# Patient Record
Sex: FEMALE | Race: WHITE | Employment: OTHER | URBAN - METROPOLITAN AREA
[De-identification: names, ages, dates, MRNs, and addresses within clinical notes are randomized per-mention and may not be internally consistent; named-entity substitution may affect disease eponyms.]

---

## 2017-01-25 ENCOUNTER — TELEPHONE (OUTPATIENT)
Dept: FAMILY MEDICINE CLINIC | Age: 82
End: 2017-01-25

## 2017-01-25 NOTE — TELEPHONE ENCOUNTER
Pt called stating she is having \"urinary frequency\" and was requesting medication. She states it just started and does not want to come in. I informed the pt that Dr. Andre Sawyer would need to see her and possibly do a UA to make sure everything is okay. The pt finally agreed to come in tomorrow for a 30min visit at 10:30 but states she wants to know if she \"can just have medication and not see the doctor\" Please advise.

## 2017-01-26 ENCOUNTER — OFFICE VISIT (OUTPATIENT)
Dept: FAMILY MEDICINE CLINIC | Age: 82
End: 2017-01-26

## 2017-01-26 ENCOUNTER — HOSPITAL ENCOUNTER (OUTPATIENT)
Dept: LAB | Age: 82
Discharge: HOME | End: 2017-01-26
Payer: MEDICARE

## 2017-01-26 VITALS
TEMPERATURE: 97.8 F | SYSTOLIC BLOOD PRESSURE: 128 MMHG | OXYGEN SATURATION: 97 % | HEART RATE: 68 BPM | WEIGHT: 161 LBS | DIASTOLIC BLOOD PRESSURE: 72 MMHG | BODY MASS INDEX: 28.53 KG/M2 | HEIGHT: 63 IN | RESPIRATION RATE: 20 BRPM

## 2017-01-26 DIAGNOSIS — N30.01 ACUTE CYSTITIS WITH HEMATURIA: ICD-10-CM

## 2017-01-26 DIAGNOSIS — R35.0 URINARY FREQUENCY: ICD-10-CM

## 2017-01-26 DIAGNOSIS — N30.01 ACUTE CYSTITIS WITH HEMATURIA: Primary | ICD-10-CM

## 2017-01-26 LAB
BILIRUB UR QL STRIP: NEGATIVE
GLUCOSE UR-MCNC: NEGATIVE MG/DL
KETONES P FAST UR STRIP-MCNC: NEGATIVE MG/DL
PH UR STRIP: 5.5 [PH] (ref 4.6–8)
PROT UR QL STRIP: NEGATIVE MG/DL
SP GR UR STRIP: 1.03 (ref 1–1.03)
UA UROBILINOGEN AMB POC: NORMAL (ref 0.2–1)
URINALYSIS CLARITY POC: CLEAR
URINALYSIS COLOR POC: YELLOW
URINE BLOOD POC: NORMAL
URINE LEUKOCYTES POC: NORMAL
URINE NITRITES POC: POSITIVE

## 2017-01-26 PROCEDURE — 87186 SC STD MICRODIL/AGAR DIL: CPT | Performed by: INTERNAL MEDICINE

## 2017-01-26 PROCEDURE — 87077 CULTURE AEROBIC IDENTIFY: CPT | Performed by: INTERNAL MEDICINE

## 2017-01-26 PROCEDURE — 87086 URINE CULTURE/COLONY COUNT: CPT | Performed by: INTERNAL MEDICINE

## 2017-01-26 RX ORDER — AMOXICILLIN AND CLAVULANATE POTASSIUM 500; 125 MG/1; MG/1
1 TABLET, FILM COATED ORAL 2 TIMES DAILY
Qty: 14 TAB | Refills: 0 | Status: SHIPPED | OUTPATIENT
Start: 2017-01-26 | End: 2017-02-02

## 2017-01-26 NOTE — MR AVS SNAPSHOT
Visit Information Date & Time Provider Department Dept. Phone Encounter #  
 1/26/2017 10:30 AM Dolores Recio  [address] [phone] [id number] Upcoming Health Maintenance Date Due Pneumococcal 65+ High/Highest Risk (2 of 2 - PPSV23) 1/1/2013 GLAUCOMA SCREENING Q2Y 9/18/2016 MEDICARE YEARLY EXAM 9/27/2017 DTaP/Tdap/Td series (2 - Td) 9/1/2026 Allergies as of 1/26/2017  Review Complete On: 1/26/2017 By: Dolores Recio MD  
  
 Severity Noted Reaction Type Reactions Other Medication High 07/21/2014    Anaphylaxis MRI Dye Epinephrine  11/19/2010    Palpitations Ivp [Fd And C Blue No.1]  11/19/2010    Swelling Facial swelling many years ago, now only has oral contrast  
 Macrobid [Nitrofurantoin Monohyd/m-cryst]  09/01/2016    Other (comments) Whole body starts shaking Other Medication  11/19/2010    Palpitations Prednisone  01/28/2011    Nausea and Vomiting Current Immunizations  Reviewed on 6/26/2014 Name Date Influenza High Dose Vaccine PF 9/1/2016 12:05 PM, 9/15/2015 Influenza Vaccine 9/20/2014 Influenza Vaccine Whole 9/1/2010 Pneumococcal Vaccine (Unspecified Type) 1/1/2008 Not reviewed this visit You Were Diagnosed With   
  
 Codes Comments Acute cystitis with hematuria    -  Primary ICD-10-CM: N30.01 
ICD-9-CM: 595.0 Urinary frequency     ICD-10-CM: R35.0 ICD-9-CM: 788.41 Vitals BP Pulse Temp Resp Height(growth percentile) Weight(growth percentile) 128/72 (BP 1 Location: Left arm, BP Patient Position: Sitting) 68 97.8 °F (36.6 °C) 20 5' 3\" (1.6 m) 161 lb (73 kg) SpO2 BMI OB Status Smoking Status 97% 28.52 kg/m2 Postmenopausal Never Smoker BMI and BSA Data Body Mass Index Body Surface Area 28.52 kg/m 2 1.8 m 2 Preferred Pharmacy Pharmacy Name Phone [address], [place] [address] [phone] Your Updated Medication List  
  
   
This list is accurate as of: 1/26/17 11:06 AM.  Always use your most recent med list.  
  
  
  
  
 ALPRAZolam 0.5 mg tablet Commonly known as:  Bridgette Oscar Take 1/2 -1 tab daily as needed for anxiety  
  
 amoxicillin-clavulanate 500-125 mg per tablet Commonly known as:  AUGMENTIN Take 1 Tab by mouth two (2) times a day for 7 days. aspirin 325 mg tablet Commonly known as:  ASPIRIN Take 1 Tab by mouth daily. atorvastatin 80 mg tablet Commonly known as:  LIPITOR Take 1 Tab by mouth nightly. citalopram 40 mg tablet Commonly known as:  CELEXA  
TAKE ONE TABLET BY MOUTH EVERY DAY  
  
 FLONASE 50 mcg/actuation nasal spray Generic drug:  fluticasone 2 Sprays by Both Nostrils route daily. traMADol 50 mg tablet Commonly known as:  ULTRAM  
Take 1 Tab by mouth daily. Max Daily Amount: 50 mg.  
  
 TYLENOL EXTRA STRENGTH 500 mg tablet Generic drug:  acetaminophen Take  by mouth every six (6) hours as needed for Pain. VITAMIN D3 1,000 unit Cap Generic drug:  cholecalciferol Take  by mouth. Prescriptions Sent to Pharmacy Refills  
 amoxicillin-clavulanate (AUGMENTIN) 500-125 mg per tablet 0 Sig: Take 1 Tab by mouth two (2) times a day for 7 days. Class: Normal  
 Pharmacy: [facility] at [address] [place] [address] #: [phone] Route: Oral  
  
We Performed the Following AMB POC URINALYSIS DIP STICK AUTO W/O MICRO [22796 CPT(R)] To-Do List   
 01/26/2017 Microbiology:  CULTURE, URINE Patient Instructions Urinary Tract Infection in Women: Care Instructions Your Care Instructions A urinary tract infection, or UTI, is a general term for an infection anywhere between the kidneys and the urethra (where urine comes out). Most UTIs are bladder infections. They often cause pain or burning when you urinate. UTIs are caused by bacteria and can be cured with antibiotics. Be sure to complete your treatment so that the infection goes away. Follow-up care is a key part of your treatment and safety. Be sure to make and go to all appointments, and call your doctor if you are having problems. It's also a good idea to know your test results and keep a list of the medicines you take. How can you care for yourself at home? · Take your antibiotics as directed. Do not stop taking them just because you feel better. You need to take the full course of antibiotics. · Drink extra water and other fluids for the next day or two. This may help wash out the bacteria that are causing the infection. (If you have kidney, heart, or liver disease and have to limit fluids, talk with your doctor before you increase your fluid intake.) · Avoid drinks that are carbonated or have caffeine. They can irritate the bladder. · Urinate often. Try to empty your bladder each time. · To relieve pain, take a hot bath or lay a heating pad set on low over your lower belly or genital area. Never go to sleep with a heating pad in place. To prevent UTIs · Drink plenty of water each day. This helps you urinate often, which clears bacteria from your system. (If you have kidney, heart, or liver disease and have to limit fluids, talk with your doctor before you increase your fluid intake.) · Consider adding cranberry juice to your diet. · Urinate when you need to. · Urinate right after you have sex. · Change sanitary pads often. · Avoid douches, bubble baths, feminine hygiene sprays, and other feminine hygiene products that have deodorants. · After going to the bathroom, wipe from front to back. When should you call for help? Call your doctor now or seek immediate medical care if: · Symptoms such as fever, chills, nausea, or vomiting get worse or appear for the first time. · You have new pain in your back just below your rib cage.  This is called flank pain. · There is new blood or pus in your urine. · You have any problems with your antibiotic medicine. Watch closely for changes in your health, and be sure to contact your doctor if: 
· You are not getting better after taking an antibiotic for 2 days. · Your symptoms go away but then come back. Where can you learn more? Go to http://ward-vin.info/. Enter O580 in the search box to learn more about \"Urinary Tract Infection in Women: Care Instructions. \" Current as of: August 12, 2016 Content Version: 11.1 © 5770-3788 [employer]. Care instructions adapted under license by [employer] (which disclaims liability or warranty for this information). If you have questions about a medical condition or this instruction, always ask your healthcare professional. [address] any warranty or liability for your use of this information. Introducing [facility]! [facility] introduces [employer] patient portal. Now you can access parts of your medical record, email your doctor's office, and request medication refills online. 1. In your internet browser, go to [email or web address]. [email or web address] 2. Click on the First Time User? Click Here link in the Sign In box. You will see the New Member Sign Up page. 3. Enter your [employer] Access Code exactly as it appears below. You will not need to use this code after youve completed the sign-up process. If you do not sign up before the expiration date, you must request a new code. · [employer] Access Code: [email or web address] Expires: 4/26/2017 11:06 AM 
 
4. Enter the last four digits of your Social Security Number (xxxx) and Date of Birth (mm/dd/yyyy) as indicated and click Submit. You will be taken to the next sign-up page. 5. Create a [employer] ID. This will be your [employer] login ID and cannot be changed, so think of one that is secure and easy to remember. 6. Create a [employer] password. You can change your password at any time. 7. Enter your Password Reset Question and Answer. This can be used at a later time if you forget your password. 8. Enter your e-mail address. You will receive e-mail notification when new information is available in [address] 9. Click Sign Up. You can now view and download portions of your medical record. 10. Click the Download Summary menu link to download a portable copy of your medical information. If you have questions, please visit the Frequently Asked Questions section of the [employer] website. Remember, [employer] is NOT to be used for urgent needs. For medical emergencies, dial 911. Now available from your iPhone and Android! Please provide this summary of care documentation to your next provider. Your primary care clinician is listed as [address]. If you have any questions after today's visit, please call [phone].

## 2017-01-26 NOTE — PROGRESS NOTES
Chief Complaint   Patient presents with    Urinary Frequency       ASSESSMENT/PLAN  1. Acute cystitis with hematuria  - amoxicillin-clavulanate (AUGMENTIN) 500-125 mg per tablet; Take 1 Tab by mouth two (2) times a day for 7 days. Dispense: 14 Tab; Refill: 0  - CULTURE, URINE; Future    2. Urinary frequency  - AMB POC URINALYSIS DIP STICK AUTO W/O MICRO      Push fluids, may use Pyridium OTC prn. Call or return to clinic prn if these symptoms worsen or fail to improve as anticipated. The plan was discussed with the patient. The patient verbalized understanding and is in agreement with the plan. All medication potential side effects were discussed with the patient. SUBJECTIVE: Zahra Meek is a 80 y.o. female who complains of urinary frequency, urgency and dysuria x 14 days, without flank pain, fever, chills, or abnormal vaginal discharge or bleeding. Having some incidents of incontinence. OBJECTIVE:   Visit Vitals    /72 (BP 1 Location: Left arm, BP Patient Position: Sitting)    Pulse 68    Temp 97.8 °F (36.6 °C)    Resp 20    Ht 5' 3\" (1.6 m)    Wt 161 lb (73 kg)    SpO2 97%    BMI 28.52 kg/m2       Gen: Appears well, in no apparent distress. CV: RRR  Pulm: CTA bilaterally. No wheezes/rales/crackles. : No CVA tenderness or inguinal adenopathy noted. Urine dipstick shows positive for RBC's and positive for nitrates.            Sowmya Farley MD

## 2017-01-26 NOTE — PATIENT INSTRUCTIONS
Urinary Tract Infection in Women: Care Instructions  Your Care Instructions    A urinary tract infection, or UTI, is a general term for an infection anywhere between the kidneys and the urethra (where urine comes out). Most UTIs are bladder infections. They often cause pain or burning when you urinate. UTIs are caused by bacteria and can be cured with antibiotics. Be sure to complete your treatment so that the infection goes away. Follow-up care is a key part of your treatment and safety. Be sure to make and go to all appointments, and call your doctor if you are having problems. It's also a good idea to know your test results and keep a list of the medicines you take. How can you care for yourself at home? · Take your antibiotics as directed. Do not stop taking them just because you feel better. You need to take the full course of antibiotics. · Drink extra water and other fluids for the next day or two. This may help wash out the bacteria that are causing the infection. (If you have kidney, heart, or liver disease and have to limit fluids, talk with your doctor before you increase your fluid intake.)  · Avoid drinks that are carbonated or have caffeine. They can irritate the bladder. · Urinate often. Try to empty your bladder each time. · To relieve pain, take a hot bath or lay a heating pad set on low over your lower belly or genital area. Never go to sleep with a heating pad in place. To prevent UTIs  · Drink plenty of water each day. This helps you urinate often, which clears bacteria from your system. (If you have kidney, heart, or liver disease and have to limit fluids, talk with your doctor before you increase your fluid intake.)  · Consider adding cranberry juice to your diet. · Urinate when you need to. · Urinate right after you have sex. · Change sanitary pads often. · Avoid douches, bubble baths, feminine hygiene sprays, and other feminine hygiene products that have deodorants.   · After going to the bathroom, wipe from front to back. When should you call for help? Call your doctor now or seek immediate medical care if:  · Symptoms such as fever, chills, nausea, or vomiting get worse or appear for the first time. · You have new pain in your back just below your rib cage. This is called flank pain. · There is new blood or pus in your urine. · You have any problems with your antibiotic medicine. Watch closely for changes in your health, and be sure to contact your doctor if:  · You are not getting better after taking an antibiotic for 2 days. · Your symptoms go away but then come back. Where can you learn more? Go to http://ward-vin.info/. Enter S604 in the search box to learn more about \"Urinary Tract Infection in Women: Care Instructions. \"  Current as of: August 12, 2016  Content Version: 11.1  © 9739-9753 [employer]. Care instructions adapted under license by [employer] (which disclaims liability or warranty for this information). If you have questions about a medical condition or this instruction, always ask your healthcare professional. [address] any warranty or liability for your use of this information.

## 2017-01-26 NOTE — PROGRESS NOTES
Jennyfer Sharpe is a 80 y.o. female  Chief Complaint   Patient presents with    Urinary Frequency     1. Have you been to the ER, urgent care clinic since your last visit? Hospitalized since your last visit? No    2. Have you seen or consulted any other health care providers outside of the [address] [place] [address] since your last visit? Include any pap smears or colon screening.  No

## 2017-01-28 LAB
BACTERIA SPEC CULT: NORMAL
SERVICE CMNT-IMP: NORMAL

## 2017-02-16 ENCOUNTER — TELEPHONE (OUTPATIENT)
Dept: FAMILY MEDICINE CLINIC | Age: 82
End: 2017-02-16

## 2017-02-16 NOTE — TELEPHONE ENCOUNTER
Please advise, I do not believe patient is a canidate for a colo and pap she does not need for her age right?

## 2017-02-22 ENCOUNTER — OFFICE VISIT (OUTPATIENT)
Dept: FAMILY MEDICINE CLINIC | Age: 82
End: 2017-02-22

## 2017-02-22 ENCOUNTER — HOSPITAL ENCOUNTER (OUTPATIENT)
Dept: LAB | Age: 82
Discharge: HOME | End: 2017-02-22
Payer: MEDICARE

## 2017-02-22 VITALS
BODY MASS INDEX: 28.53 KG/M2 | DIASTOLIC BLOOD PRESSURE: 70 MMHG | HEIGHT: 63 IN | TEMPERATURE: 97.3 F | RESPIRATION RATE: 16 BRPM | OXYGEN SATURATION: 96 % | WEIGHT: 161 LBS | SYSTOLIC BLOOD PRESSURE: 112 MMHG | HEART RATE: 74 BPM

## 2017-02-22 DIAGNOSIS — N95.2 ATROPHIC VAGINITIS: ICD-10-CM

## 2017-02-22 DIAGNOSIS — R31.0 HEMATURIA, GROSS: Primary | ICD-10-CM

## 2017-02-22 DIAGNOSIS — R31.0 HEMATURIA, GROSS: ICD-10-CM

## 2017-02-22 LAB
BILIRUB UR QL STRIP: NORMAL
GLUCOSE UR-MCNC: NEGATIVE MG/DL
KETONES P FAST UR STRIP-MCNC: NEGATIVE MG/DL
PH UR STRIP: 5 [PH] (ref 4.6–8)
PROT UR QL STRIP: NORMAL MG/DL
SP GR UR STRIP: 1.03 (ref 1–1.03)
UA UROBILINOGEN AMB POC: NORMAL (ref 0.2–1)
URINALYSIS CLARITY POC: CLEAR
URINALYSIS COLOR POC: YELLOW
URINE BLOOD POC: NORMAL
URINE LEUKOCYTES POC: NEGATIVE
URINE NITRITES POC: NEGATIVE

## 2017-02-22 PROCEDURE — 87086 URINE CULTURE/COLONY COUNT: CPT | Performed by: FAMILY MEDICINE

## 2017-02-22 RX ORDER — DICYCLOMINE HYDROCHLORIDE 10 MG/1
10 CAPSULE ORAL
COMMUNITY
End: 2017-03-10 | Stop reason: SDUPTHER

## 2017-02-22 NOTE — PROGRESS NOTES
Cecilia Kelley is a 80 y.o. female  Here for blood in urine       1. Have you been to the ER, urgent care clinic or hospitalized since your last visit? NO.     2. Have you seen or consulted any other health care providers outside of the [address] [place] [address] since your last visit (Include any pap smears or colon screening)? NO      Do you have an Advanced Directive? NO    Would you like information on Advanced Directives?  NO

## 2017-02-22 NOTE — PATIENT INSTRUCTIONS
Estrogen vaginal cream, 0.5 g per applicator vaginally daily x 1 week, then 2-3 times each week  Follow up for new symptoms, worsening symptoms or failure to improve. Atrophic Vaginitis: Care Instructions  Your Care Instructions    Atrophic vaginitis is an irritation of the vagina. It's caused by thinning tissues and less moisture in the vaginal walls. It often happens during menopause when hormone levels change. Surgery to remove the ovaries also can cause it. Your doctor may do tests to rule out other causes. And you may get tests to measure your hormone levels. The problem is most often treated with the hormone estrogen. It comes in a cream, tablets, or a soft plastic ring that is placed in the vagina. Follow-up care is a key part of your treatment and safety. Be sure to make and go to all appointments, and call your doctor if you are having problems. It's also a good idea to know your test results and keep a list of the medicines you take. How can you care for yourself at home? · Use a water-based lubricant for your vagina if sex is dry or painful. Examples are Astroglide, Wet Lubricant Gel, and K-Y Jelly. · Talk with your doctor about using low-dose vaginal estrogen. It treats dryness and thinning tissue. · Do not douche. · Having sex improves blood flow to the vagina. This helps keep your tissue healthy. · Wipe from front to back after you use the toilet. This stops the spread of bacteria to your vagina. When should you call for help? Watch closely for changes in your health, and be sure to contact your doctor if:  · You have unexpected vaginal bleeding. · You do not get better as expected. Where can you learn more? Go to http://ward-vin.info/. Enter R330 in the search box to learn more about \"Atrophic Vaginitis: Care Instructions. \"  Current as of: February 25, 2016  Content Version: 11.1  © 2818-2588 [employer].  Care instructions adapted under license by [employer] (which disclaims liability or warranty for this information). If you have questions about a medical condition or this instruction, always ask your healthcare professional. [address] any warranty or liability for your use of this information.

## 2017-02-22 NOTE — PROGRESS NOTES
HISTORY OF PRESENT ILLNESS  Juan Davenport is a 80 y.o. female. Blood in Urine    The history is provided by the patient and medical records. This is a new problem. Episode onset: 3 days ago. Associated symptoms include hematuria. Pertinent negatives include no chills, no nausea, no vomiting, no frequency, no urgency, no flank pain and no abdominal pain. Patient Active Problem List   Diagnosis Code    Depression F32.9    HLD (hyperlipidemia) E78.5    Osteopenia M85.80    Diverticulosis K57.90    Allergic rhinitis J30.9    Hearing loss of both ears H91.93    Dysgeusia R43.2    Sleep apnea G47.30    OA (osteoarthritis) M19.90    PND (post-nasal drip) R09.82    Anxiety F41.9    Nephrolithiasis N20.0    IBS (irritable bowel syndrome) K58.9    High cholesterol E78.00    Cerebral microvascular disease I67.9    CKD (chronic kidney disease) stage 3, GFR 30-59 ml/min N18.3    DNR (do not resuscitate) Z73    ESTHER (obstructive sleep apnea) G47.33    Advance care planning Z71.89    Chronic right shoulder pain M25.511, G89.29    TIA (transient ischemic attack) G45.9    Transient cerebral ischemia G45.9       Current Outpatient Prescriptions:     dicyclomine (BENTYL) 10 mg capsule, Take 10 mg by mouth 4 times daily (before meals and nightly). , Disp: , Rfl:     aspirin (ASPIRIN) 325 mg tablet, Take 1 Tab by mouth daily. , Disp: 30 Tab, Rfl: 0    fluticasone (FLONASE) 50 mcg/actuation nasal spray, 2 Sprays by Both Nostrils route daily. , Disp: , Rfl:     citalopram (CELEXA) 40 mg tablet, TAKE ONE TABLET BY MOUTH EVERY DAY, Disp: 90 Tab, Rfl: 1    acetaminophen (TYLENOL EXTRA STRENGTH) 500 mg tablet, Take  by mouth every six (6) hours as needed for Pain., Disp: , Rfl:     ALPRAZolam (XANAX) 0.5 mg tablet, Take 1/2 -1 tab daily as needed for anxiety, Disp: 30 Tab, Rfl: 2    traMADol (ULTRAM) 50 mg tablet, Take 1 Tab by mouth daily.  Max Daily Amount: 50 mg., Disp: 30 Tab, Rfl: 0    Cholecalciferol, Vitamin D3, (VITAMIN D3) 1,000 unit cap, Take  by mouth., Disp: , Rfl:     Review of Systems   Constitutional: Negative for chills and fever. Respiratory: Negative for shortness of breath. Cardiovascular: Negative for chest pain and palpitations. Gastrointestinal: Negative for abdominal pain, nausea and vomiting. Genitourinary: Positive for hematuria. Negative for dysuria, flank pain, frequency and urgency. Vaginal tenderness, reports  Putting a small piece of tissue in the introitus and noted a little blood on it. Physical Exam   Constitutional: She is oriented to person, place, and time. She appears well-developed and well-nourished. HENT:   Head: Normocephalic. Eyes: Conjunctivae and EOM are normal.   Neck: Neck supple. Cardiovascular: Normal rate, regular rhythm and normal heart sounds. Pulmonary/Chest: Effort normal and breath sounds normal.   Abdominal: Soft. There is no tenderness. No flank tenderness   Genitourinary:   Genitourinary Comments: Tender at the base of the introitus, no erythema or wound noted   Musculoskeletal: She exhibits no edema. Neurological: She is alert and oriented to person, place, and time. Skin: Skin is warm and dry. Psychiatric: She has a normal mood and affect. Her behavior is normal.   Nursing note and vitals reviewed. Results for Maria Antonia Prasad (MRN [id number]) as of 2/22/2017 15:06   Ref.  Range 2/22/2017 15:01   Color (UA POC) Unknown Yellow   Clarity (UA POC) Unknown Clear   Specific gravity (UA POC) Latest Ref Range: 1.001 - 1.035  1.030   pH (UA POC) Latest Ref Range: 4.6 - 8.0  5.0   Protein (UA POC) Latest Ref Range: Negative mg/dL 2+   Glucose (UA POC) Latest Ref Range: Negative  Negative   Ketones (UA POC) Latest Ref Range: Negative  Negative   Blood (UA POC) Latest Ref Range: Negative  3+   Bilirubin (UA POC) Latest Ref Range: Negative  1+   Urobilinogen (UA POC) Latest Ref Range: 0.2 - 1  0.2 mg/dL   Nitrites (UA POC) Latest Ref Range: Negative  Negative   Leukocyte esterase (UA POC) Latest Ref Range: Negative  Negative     ASSESSMENT and PLAN    ICD-10-CM ICD-9-CM    1. Hematuria, gross R31.0 599.71 AMB POC URINALYSIS DIP STICK MANUAL W/O MICRO      CULTURE, URINE   Estrogen vaginal cream, 0.5 g per applicator vaginally daily x 1 week, then 2-3 times each week  Follow up for new symptoms, worsening symptoms or failure to improve.   Will advise patient of urine culture results

## 2017-02-22 NOTE — MR AVS SNAPSHOT
Visit Information Date & Time Provider Department Dept. Phone Encounter #  
 2/22/2017  3:00 PM Yvonne Goff MD [employer] [phone] [id number] Upcoming Health Maintenance Date Due Pneumococcal 65+ High/Highest Risk (2 of 2 - PPSV23) 1/1/2013 GLAUCOMA SCREENING Q2Y 9/18/2016 MEDICARE YEARLY EXAM 9/27/2017 DTaP/Tdap/Td series (2 - Td) 9/1/2026 Allergies as of 2/22/2017  Review Complete On: 2/22/2017 By: Yvonne Goff MD  
  
 Severity Noted Reaction Type Reactions Other Medication High 07/21/2014    Anaphylaxis MRI Dye Epinephrine  11/19/2010    Palpitations Ivp [Fd And C Blue No.1]  11/19/2010    Swelling Facial swelling many years ago, now only has oral contrast  
 Macrobid [Nitrofurantoin Monohyd/m-cryst]  09/01/2016    Other (comments) Whole body starts shaking Other Medication  11/19/2010    Palpitations Prednisone  01/28/2011    Nausea and Vomiting Current Immunizations  Reviewed on 6/26/2014 Name Date Influenza High Dose Vaccine PF 9/1/2016 12:05 PM, 9/15/2015 Influenza Vaccine 9/20/2014 Influenza Vaccine Whole 9/1/2010 Pneumococcal Vaccine (Unspecified Type) 1/1/2008 Not reviewed this visit You Were Diagnosed With   
  
 Codes Comments Hematuria, gross    -  Primary ICD-10-CM: R31.0 ICD-9-CM: 599.71 Atrophic vaginitis     ICD-10-CM: N95.2 ICD-9-CM: 627.3 Vitals BP  
  
  
  
  
  
 112/70 (BP 1 Location: Left arm, BP Patient Position: Sitting) Vitals History BMI and BSA Data Body Mass Index Body Surface Area 28.52 kg/m 2 1.8 m 2 Preferred Pharmacy Pharmacy Name Phone [phone] , [place] [address] [phone] Your Updated Medication List  
  
   
This list is accurate as of: 2/22/17  3:22 PM.  Always use your most recent med list.  
  
  
  
  
 ALPRAZolam 0.5 mg tablet Commonly known as:  [place] Fine Take 1/2 -1 tab daily as needed for anxiety  
  
 aspirin 325 mg tablet Commonly known as:  ASPIRIN Take 1 Tab by mouth daily. citalopram 40 mg tablet Commonly known as:  CELEXA  
TAKE ONE TABLET BY MOUTH EVERY DAY  
  
 conjugated estrogens 0.625 mg/gram vaginal cream  
Commonly known as:  PREMARIN Insert 0.5 g vaginally x 1 week, then 2-3 x weekly  
  
 dicyclomine 10 mg capsule Commonly known as:  BENTYL Take 10 mg by mouth 4 times daily (before meals and nightly). FLONASE 50 mcg/actuation nasal spray Generic drug:  fluticasone 2 Sprays by Both Nostrils route daily. traMADol 50 mg tablet Commonly known as:  ULTRAM  
Take 1 Tab by mouth daily. Max Daily Amount: 50 mg.  
  
 TYLENOL EXTRA STRENGTH 500 mg tablet Generic drug:  acetaminophen Take  by mouth every six (6) hours as needed for Pain. VITAMIN D3 1,000 unit Cap Generic drug:  cholecalciferol Take  by mouth. Prescriptions Sent to Pharmacy Refills  
 conjugated estrogens (PREMARIN) 0.625 mg/gram vaginal cream 0 Sig: Insert 0.5 g vaginally x 1 week, then 2-3 x weekly Class: Normal  
 Pharmacy: [address] [place] [address] at [address] [place] [address] Ph #: [phone] We Performed the Following AMB POC URINALYSIS DIP STICK MANUAL W/O MICRO [87897 CPT(R)] Patient Instructions Estrogen vaginal cream, 0.5 g per applicator vaginally daily x 1 week, then 2-3 times each week Follow up for new symptoms, worsening symptoms or failure to improve. Atrophic Vaginitis: Care Instructions Your Care Instructions Atrophic vaginitis is an irritation of the vagina. It's caused by thinning tissues and less moisture in the vaginal walls. It often happens during menopause when hormone levels change. Surgery to remove the ovaries also can cause it. Your doctor may do tests to rule out other causes. And you may get tests to measure your hormone levels. The problem is most often treated with the hormone estrogen. It comes in a cream, tablets, or a soft plastic ring that is placed in the vagina. Follow-up care is a key part of your treatment and safety. Be sure to make and go to all appointments, and call your doctor if you are having problems. It's also a good idea to know your test results and keep a list of the medicines you take. How can you care for yourself at home? · Use a water-based lubricant for your vagina if sex is dry or painful. Examples are Astroglide, Wet Lubricant Gel, and K-Y Jelly. · Talk with your doctor about using low-dose vaginal estrogen. It treats dryness and thinning tissue. · Do not douche. · Having sex improves blood flow to the vagina. This helps keep your tissue healthy. · Wipe from front to back after you use the toilet. This stops the spread of bacteria to your vagina. When should you call for help? Watch closely for changes in your health, and be sure to contact your doctor if: 
· You have unexpected vaginal bleeding. · You do not get better as expected. Where can you learn more? Go to http://ward-vin.info/. Enter R330 in the search box to learn more about \"Atrophic Vaginitis: Care Instructions. \" Current as of: February 25, 2016 Content Version: 11.1 © 6503-5725 for[MD], Incorporated. Care instructions adapted under license by [employer] (which disclaims liability or warranty for this information). If you have questions about a medical condition or this instruction, always ask your healthcare professional. [place] [address] any warranty or liability for your use of this information. Introducing [facility]! Kristine Pastor introduces [employer] patient portal. Now you can access parts of your medical record, email your doctor's office, and request medication refills online. 1. In your internet browser, go to [email or web address] 2. Click on the First Time User? Click Here link in the Sign In box. You will see the New Member Sign Up page. 3. Enter your [employer] Access Code exactly as it appears below. You will not need to use this code after youve completed the sign-up process. If you do not sign up before the expiration date, you must request a new code. · [employer] Access Code: [email or web address] Expires: 4/26/2017 11:06 AM 
 
4. Enter the last four digits of your Social Security Number (xxxx) and Date of Birth (mm/dd/yyyy) as indicated and click Submit. You will be taken to the next sign-up page. 5. Create a [employer] ID. This will be your [employer] login ID and cannot be changed, so think of one that is secure and easy to remember. 6. Create a [employer] password. You can change your password at any time. 7. Enter your Password Reset Question and Answer. This can be used at a later time if you forget your password. 8. Enter your e-mail address. You will receive e-mail notification when new information is available in [address] 9. Click Sign Up. You can now view and download portions of your medical record. 10. Click the Download Summary menu link to download a portable copy of your medical information. If you have questions, please visit the Frequently Asked Questions section of the [employer] website. Remember, [employer] is NOT to be used for urgent needs. For medical emergencies, dial 911. Now available from your iPhone and Android! Please provide this summary of care documentation to your next provider. Your primary care clinician is listed as Gabino 13. If you have any questions after today's visit, please call [phone].

## 2017-02-23 ENCOUNTER — TELEPHONE (OUTPATIENT)
Dept: FAMILY MEDICINE CLINIC | Age: 82
End: 2017-02-23

## 2017-02-23 NOTE — TELEPHONE ENCOUNTER
Pt is concerned about blood in her urine. Pt saw Dr. Chang Sy yesterday and he prescribed Premarin. It was $45 so the pt is going to try to return it. Pt feels she should see a GYN. If possible, can she get a referral to Dr. Negro Adams? Please call pt with update.

## 2017-02-24 LAB
BACTERIA SPEC CULT: NORMAL
SERVICE CMNT-IMP: NORMAL

## 2017-02-27 ENCOUNTER — TELEPHONE (OUTPATIENT)
Dept: FAMILY MEDICINE CLINIC | Age: 82
End: 2017-02-27

## 2017-02-27 NOTE — TELEPHONE ENCOUNTER
Pt called stating she is still having blood in her urine. She saw Dr. Fifi Tamayo on 2/22 and states she was rx Premarin cream which she did not use. She says Dr. Hattie Rocha has treated her for this many times and never given this medication.

## 2017-02-27 NOTE — TELEPHONE ENCOUNTER
Pt has a UTI and wants to get worked in as soon as possible; pt was offered an appt on Monday March 6, pt can't wait that long; Dr Fleming June does not have any availability and she was told to f/u with her PCP please advise

## 2017-02-28 NOTE — TELEPHONE ENCOUNTER
Doesn't need referral.  Please have her call.     Destiny Sanchez Beat  [address], [address] [facility], [facility] ,   [address]  phone: [phone]

## 2017-03-03 ENCOUNTER — OFFICE VISIT (OUTPATIENT)
Dept: OBGYN CLINIC | Age: 82
End: 2017-03-03

## 2017-03-03 ENCOUNTER — HOSPITAL ENCOUNTER (OUTPATIENT)
Dept: LAB | Age: 82
Discharge: HOME | End: 2017-03-03
Payer: MEDICARE

## 2017-03-03 VITALS
BODY MASS INDEX: 28.53 KG/M2 | WEIGHT: 161 LBS | HEIGHT: 63 IN | HEART RATE: 66 BPM | DIASTOLIC BLOOD PRESSURE: 80 MMHG | RESPIRATION RATE: 18 BRPM | SYSTOLIC BLOOD PRESSURE: 130 MMHG

## 2017-03-03 DIAGNOSIS — R35.0 URINE FREQUENCY: ICD-10-CM

## 2017-03-03 DIAGNOSIS — R30.0 DYSURIA: Primary | ICD-10-CM

## 2017-03-03 DIAGNOSIS — N76.0 VAGINITIS AND VULVOVAGINITIS: ICD-10-CM

## 2017-03-03 LAB
BILIRUB UR QL STRIP: NEGATIVE
GLUCOSE UR-MCNC: NEGATIVE MG/DL
KETONES P FAST UR STRIP-MCNC: NEGATIVE MG/DL
PH UR STRIP: 6 [PH] (ref 4.6–8)
PROT UR QL STRIP: NORMAL MG/DL
SP GR UR STRIP: 1.03 (ref 1–1.03)
UA UROBILINOGEN AMB POC: NORMAL (ref 0.2–1)
URINALYSIS CLARITY POC: NORMAL
URINALYSIS COLOR POC: YELLOW
URINE BLOOD POC: NORMAL
URINE LEUKOCYTES POC: NORMAL
URINE NITRITES POC: NEGATIVE
WET MOUNT POCT, WMPOCT: NORMAL

## 2017-03-03 PROCEDURE — 87186 SC STD MICRODIL/AGAR DIL: CPT | Performed by: OBSTETRICS & GYNECOLOGY

## 2017-03-03 PROCEDURE — 87077 CULTURE AEROBIC IDENTIFY: CPT | Performed by: OBSTETRICS & GYNECOLOGY

## 2017-03-03 PROCEDURE — 87086 URINE CULTURE/COLONY COUNT: CPT | Performed by: OBSTETRICS & GYNECOLOGY

## 2017-03-03 RX ORDER — FLUCONAZOLE 150 MG/1
150 TABLET ORAL DAILY
Qty: 1 TAB | Refills: 0 | Status: SHIPPED | OUTPATIENT
Start: 2017-03-03 | End: 2017-03-04

## 2017-03-03 NOTE — MR AVS SNAPSHOT
Visit Information Date & Time Provider Department Dept. Phone Encounter #  
 3/3/2017  3:00 PM Whitley Holder DO [facility] OB[facility] [id number] Your Appointments 3/7/2017 11:00 AM  
Follow Up with Raiza Salmon MD  
[facility]) Appt Note: urinary issues - pt saw dr. Libra Bain at 2/22  
 [address] [facility] [address] [phone]  
  
   
 [address] [place] [address] 3/17/2017  1:45 PM  
Follow Up with Whitley Holder DO  
[address] [place] [address] ([facility]) Appt Note: follow up  
 [facility] [address] [address] [phone]  
  
   
 [facility] [id number] Allergies as of 3/3/2017  Review Complete On: 3/3/2017 By: Whitlye Holder DO Severity Noted Reaction Type Reactions Other Medication High 07/21/2014    Anaphylaxis MRI Dye Epinephrine  11/19/2010    Palpitations Ivp [Fd And C Blue No.1]  11/19/2010    Swelling Facial swelling many years ago, now only has oral contrast  
 Macrobid [Nitrofurantoin Monohyd/m-cryst]  09/01/2016    Other (comments) Whole body starts shaking Other Medication  11/19/2010    Palpitations Prednisone  01/28/2011    Nausea and Vomiting Current Immunizations  Reviewed on 6/26/2014 Name Date Influenza High Dose Vaccine PF 9/1/2016 12:05 PM, 9/15/2015 Influenza Vaccine 9/20/2014 Influenza Vaccine Whole 9/1/2010 Pneumococcal Vaccine (Unspecified Type) 1/1/2008 Not reviewed this visit You Were Diagnosed With   
  
 Codes Comments Urine frequency    -  Primary ICD-10-CM: R35.0 ICD-9-CM: 788.41 Vitals BP  
  
  
  
  
  
 130/80 Vitals History BMI and BSA Data Body Mass Index Body Surface Area 28.52 kg/m 2 1.8 m 2 Preferred Pharmacy Pharmacy Name Phone [address] [facility] [address] [phone] Your Updated Medication List  
  
   
This list is accurate as of: 3/3/17  3:31 PM.  Always use your most recent med list.  
  
  
  
  
 ALPRAZolam 0.5 mg tablet Commonly known as:  [place] Take 1/2 -1 tab daily as needed for anxiety  
  
 aspirin 325 mg tablet Commonly known as:  ASPIRIN Take 1 Tab by mouth daily. citalopram 40 mg tablet Commonly known as:  CELEXA  
TAKE ONE TABLET BY MOUTH EVERY DAY  
  
 conjugated estrogens 0.625 mg/gram vaginal cream  
Commonly known as:  PREMARIN Insert 0.5 g vaginally x 1 week, then 2-3 x weekly  
  
 dicyclomine 10 mg capsule Commonly known as:  BENTYL Take 10 mg by mouth 4 times daily (before meals and nightly). FLONASE 50 mcg/actuation nasal spray Generic drug:  fluticasone 2 Sprays by Both Nostrils route daily. traMADol 50 mg tablet Commonly known as:  ULTRAM  
Take 1 Tab by mouth daily. Max Daily Amount: 50 mg.  
  
 TYLENOL EXTRA STRENGTH 500 mg tablet Generic drug:  acetaminophen Take  by mouth every six (6) hours as needed for Pain. VITAMIN D3 1,000 unit Cap Generic drug:  cholecalciferol Take  by mouth. We Performed the Following AMB POC URINALYSIS DIP STICK MANUAL W/O MICRO [46923 CPT(R)] CULTURE, URINE [id number] CPT(R)] Introducing [facility]! Herman Arellano introduces [employer] patient portal. Now you can access parts of your medical record, email your doctor's office, and request medication refills online. 1. In your internet browser, go to [email or web address] 2. Click on the First Time User? Click Here link in the Sign In box. You will see the New Member Sign Up page. 3. Enter your [employer] Access Code exactly as it appears below. You will not need to use this code after youve completed the sign-up process.  If you do not sign up before the expiration date, you must request a new code. 
 
· GIV[employer] Access Code: [email or web address] Expires: 4/26/2017 11:06 AM 
 
4. Enter the last four digits of your Social Security Number (xxxx) and Date of Birth (mm/dd/yyyy) as indicated and click Submit. You will be taken to the next sign-up page. 5. Create a GIVINGtrax ID. This will be your GIV[employer] login ID and cannot be changed, so think of one that is secure and easy to remember. 6. Create a GIVINGtrax password. You can change your password at any time. 7. Enter your Password Reset Question and Answer. This can be used at a later time if you forget your password. 8. Enter your e-mail address. You will receive e-mail notification when new information is available in [address] 9. Click Sign Up. You can now view and download portions of your medical record. 10. Click the Download Summary menu link to download a portable copy of your medical information. If you have questions, please visit the Frequently Asked Questions section of the GIVING[employer]x website. Remember, GIVING[employer] is NOT to be used for urgent needs. For medical emergencies, dial 911. Now available from your iPhone and Android! Please provide this summary of care documentation to your next provider. Your primary care clinician is listed as Gabino 13. If you have any questions after today's visit, please call [phone].

## 2017-03-03 NOTE — PROGRESS NOTES
Subjective:      Patient complains of burning pain with urination, of frequent urination, and of occasional urge urinary incontinence. She denies any abnormal vaginal discharge or pelvic pain and states that the urinary pain is primarily external. She states that she was prescribed Premarin vaginal cream by another provider, but did not use it because she was concerned about the risks stated in the package. Current Outpatient Prescriptions   Medication Sig Dispense Refill    fluconazole (DIFLUCAN) 150 mg tablet Take 1 Tab by mouth daily for 1 day. FDA advises cautious prescribing of oral fluconazole in pregnancy. 1 Tab 0    dicyclomine (BENTYL) 10 mg capsule Take 10 mg by mouth 4 times daily (before meals and nightly).  conjugated estrogens (PREMARIN) 0.625 mg/gram vaginal cream Insert 0.5 g vaginally x 1 week, then 2-3 x weekly 45 g 0    aspirin (ASPIRIN) 325 mg tablet Take 1 Tab by mouth daily. 30 Tab 0    fluticasone (FLONASE) 50 mcg/actuation nasal spray 2 Sprays by Both Nostrils route daily.  citalopram (CELEXA) 40 mg tablet TAKE ONE TABLET BY MOUTH EVERY DAY 90 Tab 1    acetaminophen (TYLENOL EXTRA STRENGTH) 500 mg tablet Take  by mouth every six (6) hours as needed for Pain.  ALPRAZolam (XANAX) 0.5 mg tablet Take 1/2 -1 tab daily as needed for anxiety 30 Tab 2    traMADol (ULTRAM) 50 mg tablet Take 1 Tab by mouth daily. Max Daily Amount: 50 mg. 30 Tab 0    Cholecalciferol, Vitamin D3, (VITAMIN D3) 1,000 unit cap Take  by mouth.        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)      Past Surgical History:   Procedure Laterality Date    HX OTHER SURGICAL  1980    cystocele    HX UROLOGICAL      bladder tac     Family History   Problem Relation Age of Onset    Heart Attack Father     Heart Attack Brother      Social History   Substance Use Topics    Smoking status: Never Smoker    Smokeless tobacco: Never Used    Alcohol use No      Review of Systems    Pertinent items are noted in HPI. Objective:       Visit Vitals    /80    Pulse 66    Resp 18    Ht 5' 3\" (1.6 m)    Wt 161 lb (73 kg)    BMI 28.52 kg/m2     General appearance: alert, cooperative, no distress, appears younger than stated age  Pelvic: External genitalia atrophic and with extensive erythema but no discrete lesions, Vagina atrophic without discharge, rectovaginal septum normal, no palpable POP    Wet prep: no clue cells, no yeast, no trich, many WBCs       Assessment/Plan:     Dysuria, likely secondary to vulvitis. No yeast on wet prep but exam is consistent with candidal infection. Will treat empirically with diflucan. Patient also advised to use hydrocortisone ointment for symptomatic relief until the rash is resolved, then to use A&D ointment to help prevent recurrence. Urge incontinence. Will discuss further at the patient's follow up visit. Follow up 2 weeks. Plan of care discussed. Patient expressed understanding.

## 2017-03-05 LAB
BACTERIA SPEC CULT: ABNORMAL
SERVICE CMNT-IMP: ABNORMAL

## 2017-03-06 RX ORDER — AMOXICILLIN AND CLAVULANATE POTASSIUM 875; 125 MG/1; MG/1
1 TABLET, FILM COATED ORAL 2 TIMES DAILY
Qty: 14 TAB | Refills: 0 | Status: SHIPPED | OUTPATIENT
Start: 2017-03-06 | End: 2017-03-13

## 2017-03-07 ENCOUNTER — OFFICE VISIT (OUTPATIENT)
Dept: FAMILY MEDICINE CLINIC | Age: 82
End: 2017-03-07

## 2017-03-07 VITALS
WEIGHT: 160 LBS | OXYGEN SATURATION: 97 % | SYSTOLIC BLOOD PRESSURE: 112 MMHG | TEMPERATURE: 97.8 F | DIASTOLIC BLOOD PRESSURE: 64 MMHG | RESPIRATION RATE: 16 BRPM | BODY MASS INDEX: 28.35 KG/M2 | HEART RATE: 69 BPM | HEIGHT: 63 IN

## 2017-03-07 DIAGNOSIS — N39.0 RECURRENT UTI: Primary | ICD-10-CM

## 2017-03-07 LAB
BILIRUB UR QL STRIP: NEGATIVE
GLUCOSE UR-MCNC: NEGATIVE MG/DL
KETONES P FAST UR STRIP-MCNC: NEGATIVE MG/DL
PH UR STRIP: 5.5 [PH] (ref 4.6–8)
PROT UR QL STRIP: NORMAL MG/DL
SP GR UR STRIP: 1.02 (ref 1–1.03)
UA UROBILINOGEN AMB POC: NORMAL (ref 0.2–1)
URINALYSIS CLARITY POC: CLEAR
URINALYSIS COLOR POC: YELLOW
URINE BLOOD POC: NORMAL
URINE LEUKOCYTES POC: NORMAL
URINE NITRITES POC: NEGATIVE

## 2017-03-07 NOTE — PROGRESS NOTES
Jennyfer Sharpe is a 80 y.o. female here today to follow-up on dysuria. 1. Have you been to the ER, urgent care clinic since your last visit? Hospitalized since your last visit? No    2. Have you seen or consulted any other health care providers outside of the [address] [place] [address] since your last visit? Include any pap smears or colon screening.  No

## 2017-03-07 NOTE — PROGRESS NOTES
Chief Complaint   Patient presents with    Dysuria     ASSESSMENT/PLAN  1. Recurrent UTI  -pt to complete augmentin as prescribed by Dr. Alexandra Londono as culture showed E coli sensitive to PCN  -trial cranberry supplement daily. She declines use of estrace b/c of concern for side effects. Push fluids, may use Pyridium OTC prn. Call or return to clinic prn if these symptoms worsen or fail to improve as anticipated. The plan was discussed with the patient. The patient verbalized understanding and is in agreement with the plan. All medication potential side effects were discussed with the patient. SUBJECTIVE: Joselin Gan is a 80 y.o. female who was seen by Dr. Adeline To for gross hematuria. Was started on estrace vaginal cream, but didn't start it b/c of concern for side effects. Then saw Dr. Alexandra Londono on 3/3- was treated for vaginal candida and with augmentin. Urine culture now showing e coli. States the dysuria resolved. She has had recurrent UTI. OBJECTIVE:   Visit Vitals    /64    Pulse 69    Temp 97.8 °F (36.6 °C)    Resp 16    Ht 5' 3\" (1.6 m)    Wt 160 lb (72.6 kg)    SpO2 97%    BMI 28.34 kg/m2       Gen: Appears well, in no apparent distress. CV: RRR  Pulm: CTA bilaterally. No wheezes/rales/crackles. : No CVA tenderness or inguinal adenopathy noted. Urine dipstick shows positive for nitrates and positive for leukocytes.        Mecca Silva MD

## 2017-03-08 ENCOUNTER — TELEPHONE (OUTPATIENT)
Dept: FAMILY MEDICINE CLINIC | Age: 82
End: 2017-03-08

## 2017-03-08 NOTE — TELEPHONE ENCOUNTER
Pt called; states she took the Amoxicillin medication yesterday and this morning and the room started spinning; please advise

## 2017-03-08 NOTE — TELEPHONE ENCOUNTER
Pt called again wanting advice. PT reported dizziness after standing up suddenly. Stated she had not eaten anything, just had taken her antibiotic. She states she had to sit down and wanted the dr to know. I asked again about symptoms, diet, other meds. She says, no, she did have a bowl of cereal and did not stand suddenly, she had crossed the room and had been feeling fine. I asked Dr. Cristofer De La Cruz if medication reaction. She advised pt to continue hydration, keep track of any other symptomatic concerns, continue medication (she has taken this several times in the past ) and call us later in the week.

## 2017-03-10 RX ORDER — DICYCLOMINE HYDROCHLORIDE 10 MG/1
10 CAPSULE ORAL
Qty: 90 CAP | Refills: 1 | Status: SHIPPED | OUTPATIENT
Start: 2017-03-10 | End: 2017-09-27 | Stop reason: SDUPTHER

## 2017-03-10 RX ORDER — TRAMADOL HYDROCHLORIDE 50 MG/1
50 TABLET ORAL DAILY
Qty: 20 TAB | Refills: 0 | Status: SHIPPED | OUTPATIENT
Start: 2017-03-10 | End: 2017-06-16 | Stop reason: ALTCHOICE

## 2017-04-27 ENCOUNTER — OFFICE VISIT (OUTPATIENT)
Dept: FAMILY MEDICINE CLINIC | Age: 82
End: 2017-04-27

## 2017-04-27 VITALS
BODY MASS INDEX: 28.49 KG/M2 | DIASTOLIC BLOOD PRESSURE: 72 MMHG | HEART RATE: 71 BPM | TEMPERATURE: 97.9 F | SYSTOLIC BLOOD PRESSURE: 122 MMHG | RESPIRATION RATE: 20 BRPM | HEIGHT: 63 IN | WEIGHT: 160.8 LBS | OXYGEN SATURATION: 97 %

## 2017-04-27 DIAGNOSIS — R10.32 LLQ PAIN: ICD-10-CM

## 2017-04-27 DIAGNOSIS — K58.9 IRRITABLE BOWEL SYNDROME, UNSPECIFIED TYPE: Primary | ICD-10-CM

## 2017-04-27 NOTE — PROGRESS NOTES
Ilir Velazquez is a 80 y.o. female  Chief Complaint   Patient presents with    Abdominal Pain     LLQ pain     1. Have you been to the ER, urgent care clinic since your last visit? Hospitalized since your last visit? No    2. Have you seen or consulted any other health care providers outside of the [address] [place] [address] since your last visit? Include any pap smears or colon screening.  No

## 2017-04-27 NOTE — PROGRESS NOTES
Assessment/Plan:    1. Irritable bowel syndrome, unspecified type  -discussed FODMAP diet    2. LLQ pain  -improved    The plan was discussed with the patient. The patient verbalized understanding and is in agreement with the plan. All medication potential side effects were discussed with the patient. Health Maintenance:   Health Maintenance   Topic Date Due    Pneumococcal 65+ Low/Medium Risk (2 of 2 - PPSV23) 01/01/2013    GLAUCOMA SCREENING Q2Y  09/18/2016    MEDICARE YEARLY EXAM  09/27/2017    DTaP/Tdap/Td series (2 - Td) 09/01/2026    OSTEOPOROSIS SCREENING (DEXA)  Completed    ZOSTER VACCINE AGE 60>  Addressed    INFLUENZA AGE 9 TO ADULT  Completed       Ursula Ivey is a 80 y.o. female and presents with Abdominal Pain (LLQ pain)     Subjective:  Pt states she had LLQ abd pain similar to her IBS sx for the past hour. She notes she had eliminated lactose and that has helped her IBS sx. Last night, she had ice cream.  No associated diarrhea. No n/v. No urinary sx. She did take tramadol and bentyl to help with pain. ROS:  Constitutional: No recent weight change. No weakness/fatigue. No f/c. Cardiovascular: No CP/palpitations. No RAYA/orthopnea/PND. Respiratory: No cough/sputum, dyspnea, wheezing. Gastointestinal: No dysphagia, reflux. No n/v. No constipation/diarrhea. No melena/rectal bleeding. Genitourinary: No dysuria, urinary hesitancy, nocturia, hematuria. No incontinence. The problem list was updated as a part of today's visit.   Patient Active Problem List   Diagnosis Code    Depression F32.9    HLD (hyperlipidemia) E78.5    Osteopenia M85.80    Diverticulosis K57.90    Allergic rhinitis J30.9    Hearing loss of both ears H91.93    Dysgeusia R43.2    Sleep apnea G47.30    OA (osteoarthritis) M19.90    PND (post-nasal drip) R09.82    Anxiety F41.9    Nephrolithiasis N20.0    IBS (irritable bowel syndrome) K58.9    High cholesterol E78.00    Cerebral microvascular disease I67.9    CKD (chronic kidney disease) stage 3, GFR 30-59 ml/min N18.3    DNR (do not resuscitate) Z73    ESTHER (obstructive sleep apnea) G47.33    Advance care planning Z71.89    Chronic right shoulder pain M25.511, G89.29    TIA (transient ischemic attack) G45.9    Transient cerebral ischemia G45.9       The PSH, FH were reviewed. SH:  Social History   Substance Use Topics    Smoking status: Never Smoker    Smokeless tobacco: Never Used    Alcohol use No       Medications/Allergies:  Current Outpatient Prescriptions on File Prior to Visit   Medication Sig Dispense Refill    dicyclomine (BENTYL) 10 mg capsule Take 1 Cap by mouth three (3) times daily as needed. 90 Cap 1    traMADol (ULTRAM) 50 mg tablet Take 1 Tab by mouth daily. Max Daily Amount: 50 mg. 20 Tab 0    aspirin (ASPIRIN) 325 mg tablet Take 1 Tab by mouth daily. 30 Tab 0    fluticasone (FLONASE) 50 mcg/actuation nasal spray 2 Sprays by Both Nostrils route daily.  citalopram (CELEXA) 40 mg tablet TAKE ONE TABLET BY MOUTH EVERY DAY 90 Tab 1    acetaminophen (TYLENOL EXTRA STRENGTH) 500 mg tablet Take  by mouth every six (6) hours as needed for Pain.  ALPRAZolam (XANAX) 0.5 mg tablet Take 1/2 -1 tab daily as needed for anxiety 30 Tab 2    Cholecalciferol, Vitamin D3, (VITAMIN D3) 1,000 unit cap Take  by mouth. No current facility-administered medications on file prior to visit.          Allergies   Allergen Reactions    Other Medication Anaphylaxis     MRI Dye    Epinephrine Palpitations    Ivp [Fd And C Blue No.1] Swelling     Facial swelling many years ago, now only has oral contrast    Macrobid [Nitrofurantoin Monohyd/M-Cryst] Other (comments)     Whole body starts shaking       Other Medication Palpitations    Prednisone Nausea and Vomiting       Objective:  Visit Vitals    /72 (BP 1 Location: Left arm, BP Patient Position: Sitting)    Pulse 71    Temp 97.9 °F (36.6 °C) (Oral)    Resp 20    Ht 5' 3\" (1.6 m)    Wt 160 lb 12.8 oz (72.9 kg)    SpO2 97%    BMI 28.48 kg/m2      Constitutional: Well developed, nourished, no distress, alert   CV: S1, S2.  RRR. No murmurs/rubs. No thrills palpated. No carotid bruits. Intact distal pulses. No edema. Pulm: No abnormalities on inspection. Clear to auscultation bilaterally. No wheezing/rhonchi. Normal effort. GI: Soft, LLQ mild TTP, no rebound or guarding, nondistended. Normal active bowel sounds. Labwork and Ancillary Studies:    CBC w/Diff  Lab Results   Component Value Date/Time    WBC 6.6 11/27/2016 03:30 AM    HGB 12.0 11/27/2016 03:30 AM    PLATELET 490 40/44/8227 03:30 AM         Basic Metabolic Profile/LFTs  Lab Results   Component Value Date/Time    Sodium 144 11/27/2016 03:30 AM    Potassium 4.1 11/27/2016 03:30 AM    Chloride 110 11/27/2016 03:30 AM    CO2 24 11/27/2016 03:30 AM    Anion gap 10 11/27/2016 03:30 AM    Glucose 93 11/27/2016 03:30 AM    BUN 16 11/27/2016 03:30 AM    Creatinine 0.83 11/27/2016 03:30 AM    BUN/Creatinine ratio 19 11/27/2016 03:30 AM    GFR est AA >60 11/27/2016 03:30 AM    GFR est non-AA >60 11/27/2016 03:30 AM    Calcium 8.3 11/27/2016 03:30 AM      Lab Results   Component Value Date/Time    ALT (SGPT) 19 11/27/2016 03:30 AM    AST (SGOT) 15 11/27/2016 03:30 AM    Alk.  phosphatase 77 11/27/2016 03:30 AM    Bilirubin, total 0.4 11/27/2016 03:30 AM       Cholesterol  Lab Results   Component Value Date/Time    Cholesterol, total 199 11/26/2016 09:17 PM    HDL Cholesterol 47 11/26/2016 09:17 PM    LDL, calculated 125.8 11/26/2016 09:17 PM    Triglyceride 131 11/26/2016 09:17 PM    CHOL/HDL Ratio 4.2 11/26/2016 09:17 PM

## 2017-04-27 NOTE — MR AVS SNAPSHOT
Visit Information Date & Time Provider Department Dept. Phone Encounter #  
 4/27/2017  1:00 PM Dayna Ludmila, [facility] [phone] [id number] Upcoming Health Maintenance Date Due Pneumococcal 65+ Low/Medium Risk (2 of 2 - PPSV23) 1/1/2013 GLAUCOMA SCREENING Q2Y 9/18/2016 MEDICARE YEARLY EXAM 9/27/2017 DTaP/Tdap/Td series (2 - Td) 9/1/2026 Allergies as of 4/27/2017  Review Complete On: 4/27/2017 By: Dayna Keys MD  
  
 Severity Noted Reaction Type Reactions Other Medication High 07/21/2014    Anaphylaxis MRI Dye Epinephrine  11/19/2010    Palpitations Ivp [Fd And C Blue No.1]  11/19/2010    Swelling Facial swelling many years ago, now only has oral contrast  
 Macrobid [Nitrofurantoin Monohyd/m-cryst]  09/01/2016    Other (comments) Whole body starts shaking Other Medication  11/19/2010    Palpitations Prednisone  01/28/2011    Nausea and Vomiting Current Immunizations  Reviewed on 6/26/2014 Name Date Influenza High Dose Vaccine PF 9/1/2016 12:05 PM, 9/15/2015 Influenza Vaccine 9/20/2014 Influenza Vaccine Whole 9/1/2010 Pneumococcal Vaccine (Unspecified Type) 1/1/2008 Not reviewed this visit You Were Diagnosed With   
  
 Codes Comments Irritable bowel syndrome, unspecified type    -  Primary ICD-10-CM: K58.9 ICD-9-CM: 583.8 LLQ pain     ICD-10-CM: R10.32 
ICD-9-CM: 789.04 Vitals BP Pulse Temp Resp Height(growth percentile) Weight(growth percentile) 122/72 (BP 1 Location: Left arm, BP Patient Position: Sitting) 71 97.9 °F (36.6 °C) (Oral) 20 5' 3\" (1.6 m) 160 lb 12.8 oz (72.9 kg) SpO2 BMI OB Status Smoking Status 97% 28.48 kg/m2 Postmenopausal Never Smoker Vitals History BMI and BSA Data Body Mass Index Body Surface Area  
 28.48 kg/m 2 1.8 m 2 Preferred Pharmacy Pharmacy Name Phone [facility] [place] [address] [phone] Your Updated Medication List  
  
   
This list is accurate as of: 4/27/17  1:29 PM.  Always use your most recent med list.  
  
  
  
  
 ALPRAZolam 0.5 mg tablet Commonly known as:  [place] Take 1/2 -1 tab daily as needed for anxiety  
  
 aspirin 325 mg tablet Commonly known as:  ASPIRIN Take 1 Tab by mouth daily. citalopram 40 mg tablet Commonly known as:  CELEXA  
TAKE ONE TABLET BY MOUTH EVERY DAY  
  
 dicyclomine 10 mg capsule Commonly known as:  BENTYL Take 1 Cap by mouth three (3) times daily as needed. FLONASE 50 mcg/actuation nasal spray Generic drug:  fluticasone 2 Sprays by Both Nostrils route daily. traMADol 50 mg tablet Commonly known as:  ULTRAM  
Take 1 Tab by mouth daily. Max Daily Amount: 50 mg.  
  
 TYLENOL EXTRA STRENGTH 500 mg tablet Generic drug:  acetaminophen Take  by mouth every six (6) hours as needed for Pain. VITAMIN D3 1,000 unit Cap Generic drug:  cholecalciferol Take  by mouth. Introducing [facility]! Corina Lombardi introduces OMsignal patient portal. Now you can access parts of your medical record, email your doctor's office, and request medication refills online. 1. In your internet browser, go to [email or web address] 2. Click on the First Time User? Click Here link in the Sign In box. You will see the New Member Sign Up page. 3. Enter your OMsignal Access Code exactly as it appears below. You will not need to use this code after youve completed the sign-up process. If you do not sign up before the expiration date, you must request a new code. · OMsignal Access Code: [email or web address] Expires: 7/26/2017  1:29 PM 
 
4. Enter the last four digits of your Social Security Number (xxxx) and Date of Birth (mm/dd/yyyy) as indicated and click Submit. You will be taken to the next sign-up page. 5. Create a Rewarding Return ID. This will be your Rewarding Return login ID and cannot be changed, so think of one that is secure and easy to remember. 6. Create a Rewarding Return password. You can change your password at any time. 7. Enter your Password Reset Question and Answer. This can be used at a later time if you forget your password. 8. Enter your e-mail address. You will receive e-mail notification when new information is available in [address] 9. Click Sign Up. You can now view and download portions of your medical record. 10. Click the Download Summary menu link to download a portable copy of your medical information. If you have questions, please visit the Frequently Asked Questions section of the Rewarding Return website. Remember, Rewarding Return is NOT to be used for urgent needs. For medical emergencies, dial 911. Now available from your iPhone and Android! Please provide this summary of care documentation to your next provider. Your primary care clinician is listed as Gabino 13. If you have any questions after today's visit, please call [phone].

## 2017-06-15 RX ORDER — FLUTICASONE PROPIONATE 50 MCG
2 SPRAY, SUSPENSION (ML) NASAL DAILY
Qty: 3 BOTTLE | Refills: 3 | Status: SHIPPED | OUTPATIENT
Start: 2017-06-15 | End: 2018-05-29 | Stop reason: ALTCHOICE

## 2017-06-16 ENCOUNTER — HOSPITAL ENCOUNTER (OUTPATIENT)
Dept: LAB | Age: 82
Discharge: HOME | End: 2017-06-16
Payer: MEDICARE

## 2017-06-16 ENCOUNTER — OFFICE VISIT (OUTPATIENT)
Dept: FAMILY MEDICINE CLINIC | Age: 82
End: 2017-06-16

## 2017-06-16 VITALS
BODY MASS INDEX: 28.35 KG/M2 | OXYGEN SATURATION: 97 % | WEIGHT: 160 LBS | TEMPERATURE: 98 F | HEIGHT: 63 IN | DIASTOLIC BLOOD PRESSURE: 82 MMHG | SYSTOLIC BLOOD PRESSURE: 128 MMHG | HEART RATE: 71 BPM

## 2017-06-16 DIAGNOSIS — N30.01 ACUTE CYSTITIS WITH HEMATURIA: Primary | ICD-10-CM

## 2017-06-16 DIAGNOSIS — N30.01 ACUTE CYSTITIS WITH HEMATURIA: ICD-10-CM

## 2017-06-16 LAB
BILIRUB UR QL STRIP: NEGATIVE
GLUCOSE UR-MCNC: NEGATIVE MG/DL
KETONES P FAST UR STRIP-MCNC: NEGATIVE MG/DL
PH UR STRIP: 6 [PH] (ref 4.6–8)
PROT UR QL STRIP: NEGATIVE MG/DL
SP GR UR STRIP: 1.01 (ref 1–1.03)
UA UROBILINOGEN AMB POC: NORMAL (ref 0.2–1)
URINALYSIS CLARITY POC: NORMAL
URINALYSIS COLOR POC: YELLOW
URINE BLOOD POC: NORMAL
URINE LEUKOCYTES POC: NORMAL
URINE NITRITES POC: POSITIVE

## 2017-06-16 PROCEDURE — 87077 CULTURE AEROBIC IDENTIFY: CPT | Performed by: INTERNAL MEDICINE

## 2017-06-16 PROCEDURE — 87086 URINE CULTURE/COLONY COUNT: CPT | Performed by: INTERNAL MEDICINE

## 2017-06-16 PROCEDURE — 87186 SC STD MICRODIL/AGAR DIL: CPT | Performed by: INTERNAL MEDICINE

## 2017-06-16 RX ORDER — AMOXICILLIN AND CLAVULANATE POTASSIUM 500; 125 MG/1; MG/1
1 TABLET, FILM COATED ORAL 2 TIMES DAILY
Qty: 14 TAB | Refills: 0 | Status: SHIPPED | OUTPATIENT
Start: 2017-06-16 | End: 2017-06-23

## 2017-06-16 NOTE — PATIENT INSTRUCTIONS
Urinary Tract Infection in Women: Care Instructions  Your Care Instructions    A urinary tract infection, or UTI, is a general term for an infection anywhere between the kidneys and the urethra (where urine comes out). Most UTIs are bladder infections. They often cause pain or burning when you urinate. UTIs are caused by bacteria and can be cured with antibiotics. Be sure to complete your treatment so that the infection goes away. Follow-up care is a key part of your treatment and safety. Be sure to make and go to all appointments, and call your doctor if you are having problems. It's also a good idea to know your test results and keep a list of the medicines you take. How can you care for yourself at home? · Take your antibiotics as directed. Do not stop taking them just because you feel better. You need to take the full course of antibiotics. · Drink extra water and other fluids for the next day or two. This may help wash out the bacteria that are causing the infection. (If you have kidney, heart, or liver disease and have to limit fluids, talk with your doctor before you increase your fluid intake.)  · Avoid drinks that are carbonated or have caffeine. They can irritate the bladder. · Urinate often. Try to empty your bladder each time. · To relieve pain, take a hot bath or lay a heating pad set on low over your lower belly or genital area. Never go to sleep with a heating pad in place. To prevent UTIs  · Drink plenty of water each day. This helps you urinate often, which clears bacteria from your system. (If you have kidney, heart, or liver disease and have to limit fluids, talk with your doctor before you increase your fluid intake.)  · Urinate when you need to. · Urinate right after you have sex. · Change sanitary pads often. · Avoid douches, bubble baths, feminine hygiene sprays, and other feminine hygiene products that have deodorants.   · After going to the bathroom, wipe from front to back.  When should you call for help? Call your doctor now or seek immediate medical care if:  · Symptoms such as fever, chills, nausea, or vomiting get worse or appear for the first time. · You have new pain in your back just below your rib cage. This is called flank pain. · There is new blood or pus in your urine. · You have any problems with your antibiotic medicine. Watch closely for changes in your health, and be sure to contact your doctor if:  · You are not getting better after taking an antibiotic for 2 days. · Your symptoms go away but then come back. Where can you learn more? Go to http://ward-vin.info/. Enter N741 in the search box to learn more about \"Urinary Tract Infection in Women: Care Instructions. \"  Current as of: November 28, 2016  Content Version: 11.2  © 2541-2520 [employer], [employer]. Care instructions adapted under license by [employer] (which disclaims liability or warranty for this information). If you have questions about a medical condition or this instruction, always ask your healthcare professional. [address] any warranty or liability for your use of this information.

## 2017-06-16 NOTE — PROGRESS NOTES
Katalina Guzman is a 80 y.o. female  Patient presents with frequency with urination. 1. Have you been to the ER, urgent care clinic since your last visit? Hospitalized since your last visit? No    2. Have you seen or consulted any other health care providers outside of the [facility] since your last visit? Include any pap smears or colon screening.  No

## 2017-06-16 NOTE — PROGRESS NOTES
Chief Complaint   Patient presents with    Urinary Frequency     ASSESSMENT/PLAN  1. Acute cystitis with hematuria  - AMB POC URINALYSIS DIP STICK AUTO W/O MICRO  - CULTURE, URINE; Future  - amoxicillin-clavulanate (AUGMENTIN) 500-125 mg per tablet; Take 1 Tab by mouth two (2) times a day for 7 days. Dispense: 14 Tab; Refill: 0    Push fluids, may use Pyridium OTC prn. Call or return to clinic prn if these symptoms worsen or fail to improve as anticipated. The plan was discussed with the patient. The patient verbalized understanding and is in agreement with the plan. All medication potential side effects were discussed with the patient. SUBJECTIVE: Ursula Ivey is a 80 y.o. female who complains of urinary frequency, urgency and urinary incontinence x 14 days, without flank pain or dysuria, fever, chills, or abnormal vaginal discharge or bleeding. States she started having some R back pain 3 weeks ago after falling to the ground. She isn't sure if it's related to UTI. OBJECTIVE:   Visit Vitals    /82    Pulse 71    Temp 98 °F (36.7 °C) (Oral)    Ht 5' 3\" (1.6 m)    Wt 160 lb (72.6 kg)    SpO2 97%    BMI 28.34 kg/m2       Gen: Appears well, in no apparent distress. CV: RRR  Pulm: CTA bilaterally. No wheezes/rales/crackles. : No CVA tenderness or inguinal adenopathy noted. Urine dipstick shows positive for RBC's, positive for nitrates and positive for leukocytes.        Toña Chakraborty MD

## 2017-06-16 NOTE — MR AVS SNAPSHOT
Visit Information Date & Time Provider Department Dept. Phone Encounter #  
 6/16/2017  1:30 PM Janeth Wolfe, 3 [facility] [phone] [id number] Your Appointments 6/16/2017  1:30 PM  
Office Visit with Janeth Wolfe MD  
3 [facility] Appt [facility]: UTI  
 [address] [facility] [id number] [phone]  
  
   
 [address] [facility]  
  
    
 6/23/2017 11:30 AM  
Follow Up with Janeth Wolfe MD  
3 [facility] Appt Note: pain on right side syb 06/15  
 [address] [facility] [id number] [phone]  
  
   
 [address] [facility] [address] [facility] Upcoming Health Maintenance Date Due Pneumococcal 65+ Low/Medium Risk (2 of 2 - PPSV23) 1/1/2013 GLAUCOMA SCREENING Q2Y 9/18/2016 INFLUENZA AGE 9 TO ADULT 8/1/2017 MEDICARE YEARLY EXAM 9/27/2017 DTaP/Tdap/Td series (2 - Td) 9/1/2026 Allergies as of 6/16/2017  Review Complete On: 6/16/2017 By: Janeth Wolfe MD  
  
 Severity Noted Reaction Type Reactions Other Medication High 07/21/2014    Anaphylaxis MRI Dye Epinephrine  11/19/2010    Palpitations Ivp [Fd And C Blue No.1]  11/19/2010    Swelling Facial swelling many years ago, now only has oral contrast  
 Macrobid [Nitrofurantoin Monohyd/m-cryst]  09/01/2016    Other (comments) Whole body starts shaking Other Medication  11/19/2010    Palpitations Prednisone  01/28/2011    Nausea and Vomiting Current Immunizations  Reviewed on 6/26/2014 Name Date Influenza High Dose Vaccine PF 9/1/2016 12:05 PM, 9/15/2015 Influenza Vaccine 9/20/2014 Influenza Vaccine Whole 9/1/2010 Pneumococcal Vaccine (Unspecified Type) 1/1/2008 Not reviewed this visit You Were Diagnosed With   
  
 Codes Comments Acute cystitis with hematuria    -  Primary ICD-10-CM: N30.01 
ICD-9-CM: 595.0 Vitals BP Pulse Temp Height(growth percentile) Weight(growth percentile) SpO2  
 128/82 71 98 °F (36.7 °C) (Oral) 5' 3\" (1.6 m) 160 lb (72.6 kg) 97% BMI OB Status Smoking Status 28.34 kg/m2 Postmenopausal Never Smoker BMI and BSA Data Body Mass Index Body Surface Area  
 28.34 kg/m 2 1.8 m 2 Preferred Pharmacy Pharmacy Name Phone [facility] [place] [address] [phone] Your Updated Medication List  
  
   
This list is accurate as of: 6/16/17 12:58 PM.  Always use your most recent med list.  
  
  
  
  
 amoxicillin-clavulanate 500-125 mg per tablet Commonly known as:  AUGMENTIN Take 1 Tab by mouth two (2) times a day for 7 days. aspirin 325 mg tablet Commonly known as:  ASPIRIN Take 1 Tab by mouth daily. citalopram 40 mg tablet Commonly known as:  CELEXA  
TAKE ONE TABLET BY MOUTH EVERY DAY  
  
 dicyclomine 10 mg capsule Commonly known as:  BENTYL Take 1 Cap by mouth three (3) times daily as needed. fluticasone 50 mcg/actuation nasal spray Commonly known as:  [place] 2 Sprays by Both Nostrils route daily. TYLENOL EXTRA STRENGTH 500 mg tablet Generic drug:  acetaminophen Take  by mouth every six (6) hours as needed for Pain. VITAMIN D3 1,000 unit Cap Generic drug:  cholecalciferol Take  by mouth. Prescriptions Sent to Pharmacy Refills  
 amoxicillin-clavulanate (AUGMENTIN) 500-125 mg per tablet 0 Sig: Take 1 Tab by mouth two (2) times a day for 7 days. Class: Normal  
 Pharmacy: [facility] at [address] Ph #: [phone] Route: Oral  
  
We Performed the Following AMB POC URINALYSIS DIP STICK AUTO W/O MICRO [91990 CPT(R)] To-Do List   
 06/16/2017   Microbiology:  CULTURE, URINE   
  
  
 Patient Instructions Urinary Tract Infection in Women: Care Instructions Your Care Instructions A urinary tract infection, or UTI, is a general term for an infection anywhere between the kidneys and the urethra (where urine comes out). Most UTIs are bladder infections. They often cause pain or burning when you urinate. UTIs are caused by bacteria and can be cured with antibiotics. Be sure to complete your treatment so that the infection goes away. Follow-up care is a key part of your treatment and safety. Be sure to make and go to all appointments, and call your doctor if you are having problems. It's also a good idea to know your test results and keep a list of the medicines you take. How can you care for yourself at home? · Take your antibiotics as directed. Do not stop taking them just because you feel better. You need to take the full course of antibiotics. · Drink extra water and other fluids for the next day or two. This may help wash out the bacteria that are causing the infection. (If you have kidney, heart, or liver disease and have to limit fluids, talk with your doctor before you increase your fluid intake.) · Avoid drinks that are carbonated or have caffeine. They can irritate the bladder. · Urinate often. Try to empty your bladder each time. · To relieve pain, take a hot bath or lay a heating pad set on low over your lower belly or genital area. Never go to sleep with a heating pad in place. To prevent UTIs · Drink plenty of water each day. This helps you urinate often, which clears bacteria from your system. (If you have kidney, heart, or liver disease and have to limit fluids, talk with your doctor before you increase your fluid intake.) · Urinate when you need to. · Urinate right after you have sex. · Change sanitary pads often. · Avoid douches, bubble baths, feminine hygiene sprays, and other feminine hygiene products that have deodorants. · After going to the bathroom, wipe from front to back. When should you call for help? Call your doctor now or seek immediate medical care if: · Symptoms such as fever, chills, nausea, or vomiting get worse or appear for the first time. · You have new pain in your back just below your rib cage. This is called flank pain. · There is new blood or pus in your urine. · You have any problems with your antibiotic medicine. Watch closely for changes in your health, and be sure to contact your doctor if: 
· You are not getting better after taking an antibiotic for 2 days. · Your symptoms go away but then come back. Where can you learn more? Go to http://ward-vin.info/. Enter B364 in the search box to learn more about \"Urinary Tract Infection in Women: Care Instructions. \" Current as of: November 28, 2016 Content Version: 11.2 © 5601-3397 [employer]. Care instructions adapted under license by [employer] (which disclaims liability or warranty for this information). If you have questions about a medical condition or this instruction, always ask your healthcare professional. [place] [address] any warranty or liability for your use of this information. Introducing [facility]! [facility] introduces "Digital Room[employer]" patient portal. Now you can access parts of your medical record, email your doctor's office, and request medication refills online. 1. In your internet browser, go to [email or web address] 2. Click on the First Time User? Click Here link in the Sign In box. You will see the New Member Sign Up page. 3. Enter your "Digital [employer]" Access Code exactly as it appears below. You will not need to use this code after youve completed the sign-up process. If you do not sign up before the expiration date, you must request a new code. · "Digital [employer]" Access Code: [email or web address] Expires: 7/26/2017  1:29 PM 
 
 4. Enter the last four digits of your Social Security Number (xxxx) and Date of Birth (mm/dd/yyyy) as indicated and click Submit. You will be taken to the next sign-up page. 5. Create a Lvmae ID. This will be your Lvmae login ID and cannot be changed, so think of one that is secure and easy to remember. 6. Create a Lvmae password. You can change your password at any time. 7. Enter your Password Reset Question and Answer. This can be used at a later time if you forget your password. 8. Enter your e-mail address. You will receive e-mail notification when new information is available in [address] 9. Click Sign Up. You can now view and download portions of your medical record. 10. Click the Download Summary menu link to download a portable copy of your medical information. If you have questions, please visit the Frequently Asked Questions section of the Lvmae website. Remember, Lvmae is NOT to be used for urgent needs. For medical emergencies, dial 911. Now available from your iPhone and Android! Please provide this summary of care documentation to your next provider. Your primary care clinician is listed as Gabino 13. If you have any questions after today's visit, please call [phone].

## 2017-06-18 LAB
BACTERIA SPEC CULT: ABNORMAL
SERVICE CMNT-IMP: ABNORMAL

## 2017-06-29 ENCOUNTER — OFFICE VISIT (OUTPATIENT)
Dept: FAMILY MEDICINE CLINIC | Age: 82
End: 2017-06-29

## 2017-06-29 ENCOUNTER — HOSPITAL ENCOUNTER (OUTPATIENT)
Dept: LAB | Age: 82
Discharge: HOME | End: 2017-06-29
Payer: MEDICARE

## 2017-06-29 VITALS
HEART RATE: 68 BPM | OXYGEN SATURATION: 97 % | HEIGHT: 63 IN | DIASTOLIC BLOOD PRESSURE: 60 MMHG | WEIGHT: 162 LBS | RESPIRATION RATE: 17 BRPM | BODY MASS INDEX: 28.7 KG/M2 | TEMPERATURE: 98.3 F | SYSTOLIC BLOOD PRESSURE: 114 MMHG

## 2017-06-29 DIAGNOSIS — F32.A DEPRESSION, UNSPECIFIED DEPRESSION TYPE: ICD-10-CM

## 2017-06-29 DIAGNOSIS — N39.0 E. COLI UTI: Primary | ICD-10-CM

## 2017-06-29 DIAGNOSIS — L98.9 SKIN LESION: ICD-10-CM

## 2017-06-29 DIAGNOSIS — N39.498 OTHER URINARY INCONTINENCE: ICD-10-CM

## 2017-06-29 DIAGNOSIS — B96.20 E. COLI UTI: Primary | ICD-10-CM

## 2017-06-29 LAB
BILIRUB UR QL STRIP: NEGATIVE
GLUCOSE UR-MCNC: NEGATIVE MG/DL
KETONES P FAST UR STRIP-MCNC: NEGATIVE MG/DL
PH UR STRIP: 5 [PH] (ref 4.6–8)
PROT UR QL STRIP: NEGATIVE MG/DL
SP GR UR STRIP: 1.01 (ref 1–1.03)
UA UROBILINOGEN AMB POC: NORMAL (ref 0.2–1)
URINALYSIS CLARITY POC: CLEAR
URINALYSIS COLOR POC: YELLOW
URINE BLOOD POC: NEGATIVE
URINE LEUKOCYTES POC: NORMAL
URINE NITRITES POC: NEGATIVE

## 2017-06-29 PROCEDURE — 87086 URINE CULTURE/COLONY COUNT: CPT | Performed by: INTERNAL MEDICINE

## 2017-06-29 RX ORDER — AMOXICILLIN AND CLAVULANATE POTASSIUM 500; 125 MG/1; MG/1
1 TABLET, FILM COATED ORAL 2 TIMES DAILY
Qty: 20 TAB | Refills: 0 | Status: SHIPPED | OUTPATIENT
Start: 2017-06-29 | End: 2017-07-09

## 2017-06-29 RX ORDER — IBUPROFEN 200 MG
TABLET ORAL
COMMUNITY
End: 2017-08-23 | Stop reason: ALTCHOICE

## 2017-06-29 NOTE — PROGRESS NOTES
Chief Complaint   Patient presents with    Dysuria    Diarrhea     ASSESSMENT/PLAN  1. E. coli UTI and urinary incontinence  - CULTURE, URINE; Future  - AMB POC URINALYSIS DIP STICK AUTO W/O MICRO  - amoxicillin-clavulanate (AUGMENTIN) 500-125 mg per tablet; Take 1 Tab by mouth two (2) times a day for 10 days. Dispense: 20 Tab; Refill: 0    2. Depression, unspecified depression type  -cont current regimen. Has poor tolerance to other antidepressants. Push fluids, may use Pyridium OTC prn. Call or return to clinic prn if these symptoms worsen or fail to improve as anticipated. The plan was discussed with the patient. The patient verbalized understanding and is in agreement with the plan. All medication potential side effects were discussed with the patient. SUBJECTIVE: Yu Salinas is a 80 y.o. female who complains urinary incontinence. States she had augmentin left over from 6/16 UTI (she didn't take the medication correctly). Culture showed E coli. So she started taking it again today. +dysuria. no flank pain, fever, chills, or abnormal vaginal discharge or bleeding. She c/o depression due to her financial circumstances and ongoing poor relations with her children. she has poor social support and states she \"feels isolated\". OBJECTIVE:   Visit Vitals    /60 (BP 1 Location: Left arm, BP Patient Position: Sitting)    Pulse 68    Temp 98.3 °F (36.8 °C) (Oral)    Resp 17    Ht 5' 3\" (1.6 m)    Wt 162 lb (73.5 kg)    SpO2 97%    BMI 28.7 kg/m2       Gen: Appears well, in no apparent distress. CV: RRR  Pulm: CTA bilaterally. No wheezes/rales/crackles. : No CVA tenderness or inguinal adenopathy noted. Urine dipstick shows positive for leukocytes.            Betzy Avery MD

## 2017-06-29 NOTE — MR AVS SNAPSHOT
Visit Information Date & Time Provider Department Dept. Phone Encounter #  
 6/29/2017  2:00 PM Imanizee Francis, 3 [facility] [phone] [id number] Upcoming Health Maintenance Date Due Pneumococcal 65+ Low/Medium Risk (2 of 2 - PPSV23) 1/1/2013 GLAUCOMA SCREENING Q2Y 9/18/2016 INFLUENZA AGE 9 TO ADULT 8/1/2017 MEDICARE YEARLY EXAM 9/27/2017 DTaP/Tdap/Td series (2 - Td) 9/1/2026 Allergies as of 6/29/2017  Review Complete On: 6/29/2017 By: Lieutenant Tito MD  
  
 Severity Noted Reaction Type Reactions Other Medication High 07/21/2014    Anaphylaxis MRI Dye Epinephrine  11/19/2010    Palpitations Ivp [Fd And C Blue No.1]  11/19/2010    Swelling Facial swelling many years ago, now only has oral contrast  
 Macrobid [Nitrofurantoin Monohyd/m-cryst]  09/01/2016    Other (comments) Whole body starts shaking Other Medication  11/19/2010    Palpitations Prednisone  01/28/2011    Nausea and Vomiting Current Immunizations  Reviewed on 6/26/2014 Name Date Influenza High Dose Vaccine PF 9/1/2016 12:05 PM, 9/15/2015 Influenza Vaccine 9/20/2014 Influenza Vaccine Whole 9/1/2010 Pneumococcal Vaccine (Unspecified Type) 1/1/2008 Not reviewed this visit You Were Diagnosed With   
  
 Codes Comments E. coli UTI    -  Primary ICD-10-CM: N39.0, B96.20 ICD-9-CM: 599.0, 041.49 Other urinary incontinence     ICD-10-CM: T96.311 ICD-9-CM: 788.39 Depression, unspecified depression type     ICD-10-CM: F32.9 ICD-9-CM: 395 Vitals BP Pulse Temp Resp Height(growth percentile) Weight(growth percentile) 114/60 (BP 1 Location: Left arm, BP Patient Position: Sitting) 68 98.3 °F (36.8 °C) (Oral) 17 5' 3\" (1.6 m) 162 lb (73.5 kg) SpO2 BMI OB Status Smoking Status 97% 28.7 kg/m2 Postmenopausal Never Smoker Vitals History BMI and BSA Data Body Mass Index Body Surface Area 28.7 kg/m 2 1.81 m 2 Preferred Pharmacy Pharm[facility] Name Phone [facility] [address] [place] [address] [phone] Your Updated Medication List  
  
   
This list is accurate as of: 6/29/17  2:33 PM.  Always use your most recent med list.  
  
  
  
  
 amoxicillin-clavulanate 500-125 mg per tablet Commonly known as:  AUGMENTIN Take 1 Tab by mouth two (2) times a day for 10 days. aspirin 325 mg tablet Commonly known as:  ASPIRIN Take 1 Tab by mouth daily. citalopram 40 mg tablet Commonly known as:  CELEXA  
TAKE ONE TABLET BY MOUTH EVERY DAY  
  
 dicyclomine 10 mg capsule Commonly known as:  BENTYL Take 1 Cap by mouth three (3) times daily as needed. fluticasone 50 mcg/actuation nasal spray Commonly known as:  Mavis Earnest 2 Sprays by Both Nostrils route daily. ibuprofen 200 mg tablet Commonly known as:  MOTRIN Take  by mouth. Prescriptions Sent to Pharmacy Refills  
 amoxicillin-clavulanate (AUGMENTIN) 500-125 mg per tablet 0 Sig: Take 1 Tab by mouth two (2) times a day for 10 days. Class: Normal  
 Pharmacy: [facility] at [address] Ph #: [phone] Route: Oral  
  
We Performed the Following AMB POC URINALYSIS DIP STICK AUTO W/O MICRO [24668 CPT(R)] To-Do List   
 06/29/2017 Microbiology:  CULTURE, URINE   
  
 06/29/2017 Microbiology:  CULTURE, URINE Introducing [facility]! [facility] introduces Bandtastic patient portal. Now you can access parts of your medical record, email your doctor's office, and request medication refills online. 1. In your internet browser, go to [email or web address] 2. Click on the First Time User? Click Here link in the Sign In box. You will see the New Member Sign Up page. 3. Enter your Bandtastic Access Code exactly as it appears below.  You will not need to use this code after youve completed the sign-up process. If you do not sign up before the expiration date, you must request a new code. · Betterific Access Code: [email or web address] Expires: 7/26/2017  1:29 PM 
 
4. Enter the last four digits of your Social Security Number (xxxx) and Date of Birth (mm/dd/yyyy) as indicated and click Submit. You will be taken to the next sign-up page. 5. Create a Betterific ID. This will be your Betterific login ID and cannot be changed, so think of one that is secure and easy to remember. 6. Create a Betterific password. You can change your password at any time. 7. Enter your Password Reset Question and Answer. This can be used at a later time if you forget your password. 8. Enter your e-mail address. You will receive e-mail notification when new information is available in [address] 9. Click Sign Up. You can now view and download portions of your medical record. 10. Click the Download Summary menu link to download a portable copy of your medical information. If you have questions, please visit the Frequently Asked Questions section of the Better[employer] website. Remember, Betterific is NOT to be used for urgent needs. For medical emergencies, dial 911. Now available from your iPhone and Android! Please provide this summary of care documentation to your next provider. Your primary care clinician is listed as Gabino 13. If you have any questions after today's visit, please call [phone].

## 2017-06-29 NOTE — PROGRESS NOTES
1. Have you been to the ER, urgent care clinic since your last visit? Hospitalized since your last visit?   no     2. Have you seen or consulted any other health care providers outside of the [address] [place] [address] since your last visit? Include any pap smears or colon screening. No       Pt only took her augmentin once per day, unlike written directions. Pt requests dermatology appt. Pt has frequent wetting accidents.

## 2017-07-01 LAB
BACTERIA SPEC CULT: NORMAL
SERVICE CMNT-IMP: NORMAL

## 2017-08-14 ENCOUNTER — OFFICE VISIT (OUTPATIENT)
Dept: FAMILY MEDICINE CLINIC | Age: 82
End: 2017-08-14

## 2017-08-14 VITALS
BODY MASS INDEX: 28.35 KG/M2 | HEART RATE: 66 BPM | HEIGHT: 63 IN | RESPIRATION RATE: 22 BRPM | SYSTOLIC BLOOD PRESSURE: 141 MMHG | TEMPERATURE: 97.7 F | DIASTOLIC BLOOD PRESSURE: 85 MMHG | OXYGEN SATURATION: 97 % | WEIGHT: 160 LBS

## 2017-08-14 DIAGNOSIS — R05.9 COUGH: Primary | ICD-10-CM

## 2017-08-14 RX ORDER — AMOXICILLIN AND CLAVULANATE POTASSIUM 875; 125 MG/1; MG/1
1 TABLET, FILM COATED ORAL 2 TIMES DAILY
Qty: 14 TAB | Refills: 0 | Status: SHIPPED | OUTPATIENT
Start: 2017-08-14 | End: 2017-08-23 | Stop reason: ALTCHOICE

## 2017-08-14 RX ORDER — BENZONATATE 200 MG/1
200 CAPSULE ORAL
Qty: 20 CAP | Refills: 0 | Status: SHIPPED | OUTPATIENT
Start: 2017-08-14 | End: 2017-08-23 | Stop reason: ALTCHOICE

## 2017-08-14 NOTE — PROGRESS NOTES
Marylen Clack is a 80 y.o. female  1. Have you been to the ER, urgent care clinic since your last visit? Hospitalized since your last visit? No    2. Have you seen or consulted any other health care providers outside of the [address] [place] [address] since your last visit? Include any pap smears or colon screening.  No

## 2017-08-14 NOTE — PROGRESS NOTES
HISTORY OF PRESENT ILLNESS  Jerrell Prado is a 80 y.o. female. HPI  C/o cough, started 2 weeks ago, getting worse, coughing yellow sputum, no wheezing  Review of Systems   Constitutional: Negative for chills and fever. HENT: Negative for ear pain and sore throat. Respiratory: Negative for hemoptysis and shortness of breath. Gastrointestinal: Negative for nausea and vomiting. Physical Exam   Constitutional: She appears well-developed and well-nourished. HENT:   Right Ear: Tympanic membrane normal.   Left Ear: Tympanic membrane normal.   Mouth/Throat: No oropharyngeal exudate. Neck: Neck supple. Cardiovascular: Normal rate, regular rhythm and normal heart sounds. Pulmonary/Chest: Effort normal. She has no wheezes. She has rhonchi (few, left lower lobe). Vitals reviewed. ASSESSMENT and PLAN  Diagnoses and all orders for this visit:    1. Cough  -     XR CHEST PA LAT; Future  -     benzonatate (TESSALON) 200 mg capsule; Take 1 Cap by mouth three (3) times daily as needed for Cough. -     amoxicillin-clavulanate (AUGMENTIN) 875-125 mg per tablet; Take 1 Tab by mouth two (2) times a day.

## 2017-08-20 ENCOUNTER — APPOINTMENT (OUTPATIENT)
Dept: GENERAL RADIOLOGY | Age: 82
End: 2017-08-20
Attending: PHYSICIAN ASSISTANT
Payer: MEDICARE

## 2017-08-20 ENCOUNTER — APPOINTMENT (OUTPATIENT)
Dept: CT IMAGING | Age: 82
End: 2017-08-20
Attending: PHYSICIAN ASSISTANT
Payer: MEDICARE

## 2017-08-20 ENCOUNTER — HOSPITAL ENCOUNTER (EMERGENCY)
Age: 82
Discharge: HOME | End: 2017-08-20
Attending: EMERGENCY MEDICINE
Payer: MEDICARE

## 2017-08-20 VITALS
TEMPERATURE: 98.4 F | BODY MASS INDEX: 28.17 KG/M2 | SYSTOLIC BLOOD PRESSURE: 141 MMHG | OXYGEN SATURATION: 97 % | DIASTOLIC BLOOD PRESSURE: 69 MMHG | RESPIRATION RATE: 16 BRPM | HEIGHT: 63 IN | WEIGHT: 159 LBS | HEART RATE: 72 BPM

## 2017-08-20 DIAGNOSIS — S41.111A LACERATION OF ARM, RIGHT, INITIAL ENCOUNTER: Primary | ICD-10-CM

## 2017-08-20 DIAGNOSIS — S00.11XA CONTUSION OF RIGHT PERIOCULAR REGION, INITIAL ENCOUNTER: ICD-10-CM

## 2017-08-20 PROCEDURE — 72125 CT NECK SPINE W/O DYE: CPT

## 2017-08-20 PROCEDURE — 74011250637 HC RX REV CODE- 250/637: Performed by: PHYSICIAN ASSISTANT

## 2017-08-20 PROCEDURE — 70450 CT HEAD/BRAIN W/O DYE: CPT

## 2017-08-20 PROCEDURE — 73560 X-RAY EXAM OF KNEE 1 OR 2: CPT

## 2017-08-20 PROCEDURE — 73030 X-RAY EXAM OF SHOULDER: CPT

## 2017-08-20 PROCEDURE — 99284 EMERGENCY DEPT VISIT MOD MDM: CPT

## 2017-08-20 PROCEDURE — 73090 X-RAY EXAM OF FOREARM: CPT

## 2017-08-20 RX ORDER — ACETAMINOPHEN 500 MG
1000 TABLET ORAL
Status: COMPLETED | OUTPATIENT
Start: 2017-08-20 | End: 2017-08-20

## 2017-08-20 RX ADMIN — ACETAMINOPHEN 1000 MG: 500 TABLET ORAL at 19:27

## 2017-08-20 NOTE — ED TRIAGE NOTES
Pt tripped at home, she got her flip flops caught on her rug. She fell forward has a bump above right eye bruising and a laceration on her right arm. Pt drove herself to urgent care and was sent here for CT.

## 2017-08-20 NOTE — ED PROVIDER NOTES
HPI Comments: Ruslan Whitley is a 80 y.o. female that presents to the ED via EMS with a complaint of arm pain, arm laceration and swelling to forehead. Pt states that she was was walking at home when she tripped over a throw rug and fell into a wall. Pt states that she drove herself to Urgent Care but they recommended she come here for imaging. Pt denies dizziness or lightheadedness before fall and LOC following, denies SOB, headache and CP. Pt states that she rates her pain as 3/10 and throbbing. Patient is a 80 y.o. female presenting with skin laceration and fall. Laceration      Fall   Associated symptoms include laceration. Pertinent negatives include no fever, no nausea and no vomiting. 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    Diverticula of colon     Gastrointestinal disorder     High cholesterol     IBS (irritable bowel syndrome)     Kidney stone     Other ill-defined conditions     heart murmur    Psychiatric disorder     anxiety    Skin cancer     Stroke [facility])        Past Surgical History:   Procedure Laterality Date    HX GYN  1970s    bladder tack in [facility]     HX OTHER SURGICAL  1980    cystocele    HX UROLOGICAL      bladder tac         Family History:   Problem Relation Age of Onset    Heart Attack Father     Heart Attack Brother        Social History     Social History    Marital status:      Spouse name: N/A    Number of children: N/A    Years of education: N/A     Occupational History    Not on file. Social History Main Topics    Smoking status: Never Smoker    Smokeless tobacco: Never Used    Alcohol use No    Drug use: No    Sexual activity: Not Currently     Other Topics Concern    Not on file     Social History Narrative    ** Merged History Encounter **              ALLERGIES: Other medication; Ciprofloxacin; Epinephrine; Ivp [fd and c blue no.1];  Macrobid [nitrofurantoin monohyd/m-cryst]; Other medication; and Prednisone    Review of Systems   Constitutional: Negative for fatigue and fever. Respiratory: Negative for cough and shortness of breath. Cardiovascular: Negative for chest pain. Gastrointestinal: Negative for diarrhea, nausea and vomiting. Musculoskeletal: Negative for arthralgias and myalgias. Skin: Positive for color change and wound. All other systems reviewed and are negative. Vitals:    08/20/17 1914   Weight: 72.1 kg (159 lb)   Height: 5' 3\" (1.6 m)            Physical Exam   Constitutional: She is oriented to person, place, and time. She appears well-developed and well-nourished. No distress. HENT:   Head: Normocephalic. Head is with contusion. Swelling noted   Eyes: Conjunctivae are normal. Pupils are equal, round, and reactive to light. Neck: Normal range of motion. Neck supple. Cardiovascular: Normal rate, regular rhythm and normal heart sounds. Pulmonary/Chest: Effort normal and breath sounds normal.   Musculoskeletal:        Right forearm: She exhibits tenderness, bony tenderness and laceration. She exhibits no swelling, no edema and no deformity. Neurological: She is alert and oriented to person, place, and time. Skin: Laceration noted. 5 cm laceration to right forearm   Psychiatric: She has a normal mood and affect.  Her behavior is normal.        MDM  Number of Diagnoses or Management Options  Contusion of right periocular region, initial encounter:   Laceration of arm, right, initial encounter:   Diagnosis management comments: XRAYs: negative for acute process as read by self    CT HEad: no acute intracranial process  Small right periorbital hematoma  Chronic right basal ganglia lacunar infarct  Chronic paranasal sinus disease    CT Spine: no evidence of cervical spinal injury  Mild multilevel DJD    Impression: contusion head, laceration arm, joint pain    Plan: wound closure  Discharge home  Ice to affected area 3x daily for 20 minutes each  Tylenol for pain   Follow up with PCP  REturn to ED for red flag signs as discussed    ED Course       Procedures           Vitals:  Patient Vitals for the past 12 hrs:   Temp Pulse Resp BP SpO2   08/20/17 1915 98.4 °F (36.9 °C) 81 16 143/64 99 %         Medications ordered:   Medications   acetaminophen (TYLENOL) tablet 1,000 mg (1,000 mg Oral Given 8/20/17 1927)         Lab findings:  No results found for this or any previous visit (from the past 12 hour(s)). X-Ray, CT or other radiology findings or impressions:  XR KNEE RT MAX 2 VWS   Final Result      XR SHOULDER RT AP/LAT MIN 2 V   Final Result      XR FOREARM RT AP/LAT   Final Result      CT HEAD WO CONT    (Results Pending)   CT SPINE CERV WO CONT    (Results Pending)       Progress notes, Consult notes or additional Procedure notes:       Disposition:  Diagnosis:   1. Laceration of arm, right, initial encounter    2. Contusion of right periocular region, initial encounter        Disposition: discharge    Follow-up Information     None           Patient's Medications   Start Taking    No medications on file   Continue Taking    AMOXICILLIN-CLAVULANATE (AUGMENTIN) 875-125 MG PER TABLET    Take 1 Tab by mouth two (2) times a day. ASPIRIN (ASPIRIN) 325 MG TABLET    Take 1 Tab by mouth daily. BENZONATATE (TESSALON) 200 MG CAPSULE    Take 1 Cap by mouth three (3) times daily as needed for Cough. CITALOPRAM (CELEXA) 40 MG TABLET    TAKE ONE TABLET BY MOUTH EVERY DAY    DICYCLOMINE (BENTYL) 10 MG CAPSULE    Take 1 Cap by mouth three (3) times daily as needed. FLUTICASONE (FLONASE) 50 MCG/ACTUATION NASAL SPRAY    2 Sprays by Both Nostrils route daily. IBUPROFEN (MOTRIN) 200 MG TABLET    Take  by mouth.    These Medications have changed    No medications on file   Stop Taking    No medications on file

## 2017-08-20 NOTE — ED NOTES
Patient fell after tripping on a rug while wearing flip flops. Injury to her right forearm with a skin tear and patient hit her head. Patient has swelling and bruising above her right eye.

## 2017-08-21 NOTE — ED NOTES
Patient discharged home, A&ox4, no apparent distress. Patient verbalized understanding of discharge instructions and follow up.

## 2017-08-21 NOTE — DISCHARGE INSTRUCTIONS
Head Injury: Care Instructions  Your Care Instructions  Most injuries to the head are minor. Bumps, cuts, and scrapes on the head and face usually heal well and can be treated the same as injuries to other parts of the body. Although it's rare, once in a while a more serious problem shows up after you are home. So it's good to be on the lookout for symptoms for a day or two. Follow-up care is a key part of your treatment and safety. Be sure to make and go to all appointments, and call your doctor if you are having problems. It's also a good idea to know your test results and keep a list of the medicines you take. How can you care for yourself at home? · Follow your doctor's instructions. He or she will tell you if you need someone to watch you closely for the next 24 hours or longer. · Take it easy for the next few days or more if you are not feeling well. · Ask your doctor when it's okay for you to go back to activities like driving a car, riding a bike, or operating machinery. When should you call for help? Call 911 anytime you think you may need emergency care. For example, call if:  · You have a seizure. · You passed out (lost consciousness). · You are confused or can't stay awake. Call your doctor now or seek immediate medical care if:  · You have new or worse vomiting. · You feel less alert. · You have new weakness or numbness in any part of your body. Watch closely for changes in your health, and be sure to contact your doctor if:  · You do not get better as expected. · You have new symptoms, such as headaches, trouble concentrating, or changes in mood. Where can you learn more? Go to http://ward-vin.info/. Enter H359 in the search box to learn more about \"Head Injury: Care Instructions. \"  Current as of: October 14, 2016  Content Version: 11.3  © 6765-5368 [employer].  Care instructions adapted under license by [employer] (which disclaims liability or warranty for this information). If you have questions about a medical condition or this instruction, always ask your healthcare professional. [address] any warranty or liability for your use of this information. Cuts Closed With Adhesives: Care Instructions  Your Care Instructions  A cut can happen anywhere on your body. The doctor used an adhesive to close the cut. When the adhesive dries, it forms a film that holds the edges of the cut together. Skin adhesives are sometimes called liquid stitches. If the cut went deep and through the skin, the doctor may have put in a layer of stitches below the adhesive. The deeper layer of stitches brings the deep part of the cut together. These stitches will dissolve and don't need to be removed. You don't see the stitches, only the adhesive. You may have a bandage. The doctor has checked you carefully, but problems can develop later. If you notice any problems or new symptoms, get medical treatment right away. Follow-up care is a key part of your treatment and safety. Be sure to make and go to all appointments, and call your doctor if you are having problems. It's also a good idea to know your test results and keep a list of the medicines you take. How can you care for yourself at home? · Keep the cut dry for the first 24 to 48 hours. After this, you can shower if your doctor okays it. Pat the cut dry. · Don't soak the cut, such as in a bathtub. Your doctor will tell you when it's safe to get the cut wet. · If your doctor told you how to care for your cut, follow your doctor's instructions. If you did not get instructions, follow this general advice:  ¨ Do not put any kind of ointment, cream, or lotion over the area. This can make the adhesive fall off too soon. ¨ After the first 24 to 48 hours, wash around the cut with clean water 2 times a day. Do not use hydrogen peroxide or alcohol, which can slow healing.   ¨ If the doctor told you to use a bandage, put on a new bandage after cleaning the cut or if the bandage gets wet or dirty. · Prop up the sore area on a pillow anytime you sit or lie down during the next 3 days. Try to keep it above the level of your heart. This will help reduce swelling. · Leave the skin adhesive on your skin until it falls off on its own. This may take 5 to 10 days. · Do not scratch, rub, or pick at the adhesive. · Do not put the sticky part of a bandage directly on the adhesive. · Avoid any activity that could cause your cut to reopen. · Be safe with medicines. Read and follow all instructions on the label. ¨ If the doctor gave you a prescription medicine for pain, take it as prescribed. ¨ If you are not taking a prescription pain medicine, ask your doctor if you can take an over-the-counter medicine. When should you call for help? Call your doctor now or seek immediate medical care if:  · You have new pain, or your pain gets worse. · The skin near the cut is cold or pale or changes color. · You have tingling, weakness, or numbness near the cut. · The cut starts to bleed. · You have trouble moving the area near the cut. · You have symptoms of infection, such as:  ¨ Increased pain, swelling, warmth, or redness around the cut. ¨ Red streaks leading from the cut. ¨ Pus draining from the cut. ¨ A fever. Watch closely for changes in your health, and be sure to contact your doctor if:  · The cut reopens. · You do not get better as expected. Where can you learn more? Go to http://ward-vin.info/. Enter P174 in the search box to learn more about \"Cuts Closed With Adhesives: Care Instructions. \"  Current as of: March 20, 2017  Content Version: 11.3  © 1296-9456 [employer]. Care instructions adapted under license by [employer] (which disclaims liability or warranty for this information).  If you have questions about a medical condition or this instruction, always ask your healthcare professional. [place] [address] any warranty or liability for your use of this information.

## 2017-08-22 ENCOUNTER — TELEPHONE (OUTPATIENT)
Dept: FAMILY MEDICINE CLINIC | Age: 82
End: 2017-08-22

## 2017-08-22 NOTE — TELEPHONE ENCOUNTER
Pt was in ER 8/20, [facility], after falling. Pt feels beat-up and feels she needs to be seen. Should pt see another provider?

## 2017-08-23 ENCOUNTER — OFFICE VISIT (OUTPATIENT)
Dept: FAMILY MEDICINE CLINIC | Age: 82
End: 2017-08-23

## 2017-08-23 VITALS
RESPIRATION RATE: 20 BRPM | SYSTOLIC BLOOD PRESSURE: 116 MMHG | OXYGEN SATURATION: 96 % | WEIGHT: 158.8 LBS | BODY MASS INDEX: 28.14 KG/M2 | HEART RATE: 63 BPM | DIASTOLIC BLOOD PRESSURE: 68 MMHG | HEIGHT: 63 IN | TEMPERATURE: 98.1 F

## 2017-08-23 DIAGNOSIS — F41.9 ANXIETY: ICD-10-CM

## 2017-08-23 DIAGNOSIS — F41.8 DEPRESSION WITH ANXIETY: ICD-10-CM

## 2017-08-23 DIAGNOSIS — F32.A DEPRESSION, UNSPECIFIED DEPRESSION TYPE: ICD-10-CM

## 2017-08-23 DIAGNOSIS — S00.11XA PERIORBITAL ECCHYMOSIS OF RIGHT EYE, INITIAL ENCOUNTER: ICD-10-CM

## 2017-08-23 DIAGNOSIS — S41.111A LACERATION OF ARM, RIGHT, INITIAL ENCOUNTER: ICD-10-CM
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
## 2017-08-23 NOTE — PATIENT INSTRUCTIONS

## 2017-08-23 NOTE — MR AVS SNAPSHOT
Visit Information Date & Time Provider Department Dept. Phone Encounter #  
 8/23/2017  1:00 PM Aster Farah, [facility] [phone] [id number] Your Appointments 8/30/2017 10:15 AM  
Office Visit with Aster Farah MD  
[facility]) Appt Note: FEELING FATIGUE RIGHT KNEE PAIN  
 [address] [facility] [id number] [phone]  
  
   
 [address] Upcoming Health Maintenance Date Due Pneumococcal 65+ Low/Medium Risk (2 of 2 - PPSV23) 1/1/2013 GLAUCOMA SCREENING Q2Y 9/18/2016 INFLUENZA AGE 9 TO ADULT 8/1/2017 MEDICARE YEARLY EXAM 9/27/2017 DTaP/Tdap/Td series (2 - Td) 9/1/2026 Allergies as of 8/23/2017  Review Complete On: 8/23/2017 By: Aster Farah MD  
  
 Severity Noted Reaction Type Reactions Other Medication High 07/21/2014    Anaphylaxis MRI Dye  
 Ciprofloxacin  08/20/2017    Anxiety Epinephrine  11/19/2010    Palpitations Ivp [Fd And C Blue No.1]  11/19/2010    Swelling Facial swelling many years ago, now only has oral contrast  
 Macrobid [Nitrofurantoin Monohyd/m-cryst]  09/01/2016    Other (comments) Whole body starts shaking Other Medication  11/19/2010    Palpitations Prednisone  01/28/2011    Nausea and Vomiting Current Immunizations  Reviewed on 6/26/2014 Name Date Influenza High Dose Vaccine PF 9/1/2016 12:05 PM, 9/15/2015 Influenza Vaccine 9/20/2014 Influenza Vaccine Whole 9/1/2010 ZZZ-RETIRED (DO NOT USE) Pneumococcal Vaccine (Unspecified Type) 1/1/2008 Not reviewed this visit You Were Diagnosed With   
  
 Codes Comments Fall, initial encounter    -  Primary ICD-10-CM: W19. Jonh Combe ICD-9-CM: E888.9 Periorbital ecchymosis of right eye, initial encounter     ICD-10-CM: S00. 11XA ICD-9-CM: 921.1 Laceration of arm, right, initial encounter     ICD-10-CM: S41.111A ICD-9-CM: 884.0 Depression, unspecified depression type     ICD-10-CM: F32.9 ICD-9-CM: 853 Depression with anxiety     ICD-10-CM: F41.8 ICD-9-CM: 300.4 Anxiety     ICD-10-CM: F41.9 ICD-9-CM: 300.00 Vitals BP Pulse Temp Resp Height(growth percentile) Weight(growth percentile) 116/68 (BP 1 Location: Left arm, BP Patient Position: Sitting) 63 98.1 °F (36.7 °C) (Oral) 20 5' 3\" (1.6 m) 158 lb 12.8 oz (72 kg) SpO2 BMI OB Status Smoking Status 96% 28.13 kg/m2 Postmenopausal Never Smoker BMI and BSA Data Body Mass Index Body Surface Area  
 28.13 kg/m 2 1.79 m 2 Preferred Pharmacy Pharmacy Name Phone [facility] [place] [address] [phone] Your Updated Medication List  
  
   
This list is accurate as of: 8/23/17  1:24 PM.  Always use your most recent med list.  
  
  
  
  
 citalopram 40 mg tablet Commonly known as:  CELEXA  
TAKE ONE TABLET BY MOUTH EVERY DAY  
  
 dicyclomine 10 mg capsule Commonly known as:  BENTYL Take 1 Cap by mouth three (3) times daily as needed. fluticasone 50 mcg/actuation nasal spray Commonly known as:  [employer] 2 Sprays by Both Nostrils route daily. NYQUIL PO Take  by mouth. Patient Instructions Preventing Falls: Care Instructions Your Care Instructions Getting around your home safely can be a challenge if you have injuries or health problems that make it easy for you to fall. Loose rugs and furniture in walkways are among the dangers for many older people who have problems walking or who have poor eyesight. People who have conditions such as arthritis, osteoporosis, or dementia also have to be careful not to fall. You can make your home safer with a few simple measures. Follow-up care is a key part of your treatment and safety.  Be sure to make and go to all appointments, and call your doctor if you are having problems. It's also a good idea to know your test results and keep a list of the medicines you take. How can you care for yourself at home? Taking care of yourself · You may get dizzy if you do not drink enough water. To prevent dehydration, drink plenty of fluids, enough so that your urine is light yellow or clear like water. Choose water and other caffeine-free clear liquids. If you have kidney, heart, or liver disease and have to limit fluids, talk with your doctor before you increase the amount of fluids you drink. · Exercise regularly to improve your strength, muscle tone, and balance. Walk if you can. Swimming may be a good choice if you cannot walk easily. · Have your vision and hearing checked each year or any time you notice a change. If you have trouble seeing and hearing, you might not be able to avoid objects and could lose your balance. · Know the side effects of the medicines you take. Ask your doctor or pharmacist whether the medicines you take can affect your balance. Sleeping pills or sedatives can affect your balance. · Limit the amount of alcohol you drink. Alcohol can impair your balance and other senses. · Ask your doctor whether calluses or corns on your feet need to be removed. If you wear loose-fitting shoes because of calluses or corns, you can lose your balance and fall. · Talk to your doctor if you have numbness in your feet. Preventing falls at home · Remove raised doorway thresholds, throw rugs, and clutter. Repair loose carpet or raised areas in the floor. · Move furniture and electrical cords to keep them out of walking paths. · Use nonskid floor wax, and wipe up spills right away, especially on ceramic tile floors. · If you use a walker or cane, put rubber tips on it. If you use crutches, clean the bottoms of them regularly with an abrasive pad, such as steel wool. · Keep your house well lit, especially Catherine Wilder, and outside walkways. Use night-lights in areas such as hallways and bathrooms. Add extra light switches or use remote switches (such as switches that go on or off when you clap your hands) to make it easier to turn lights on if you have to get up during the night. · Install sturdy handrails on stairways. · Move items in your cabinets so that the things you use a lot are on the lower shelves (about waist level). · Keep a cordless phone and a flashlight with new batteries by your bed. If possible, put a phone in each of the main rooms of your house, or carry a cell phone in case you fall and cannot reach a phone. Or, you can wear a device around your neck or wrist. You push a button that sends a signal for help. · Wear low-heeled shoes that fit well and give your feet good support. Use footwear with nonskid soles. Check the heels and soles of your shoes for wear. Repair or replace worn heels or soles. · Do not wear socks without shoes on wood floors. · Walk on the grass when the sidewalks are slippery. If you live in an area that gets snow and ice in the winter, sprinkle salt on slippery steps and sidewalks. Preventing falls in the bath · Install grab bars and nonskid mats inside and outside your shower or tub and near the toilet and sinks. · Use shower chairs and bath benches. · Use a hand-held shower head that will allow you to sit while showering. · Get into a tub or shower by putting the weaker leg in first. Get out of a tub or shower with your strong side first. 
· Repair loose toilet seats and consider installing a raised toilet seat to make getting on and off the toilet easier. · Keep your bathroom door unlocked while you are in the shower. Where can you learn more? Go to http://ward-vin.info/. Enter [phone] in the search box to learn more about \"Preventing Falls: Care Instructions. \" Current as of: August 4, 2016 Content Version: 11.3 © 4790-1142 [employer], Managed by Q. Care instructions adapted under license by [employer] (which disclaims liability or warranty for this information). If you have questions about a medical condition or this instruction, always ask your healthcare professional. [address] any warranty or liability for your use of this information. Introducing [facility] [place] [facility]! [facility] introduces [email or web address] patient portal. Now you can access parts of your medical record, email your doctor's office, and request medication refills online. 1. In your internet browser, go to [email or web address] 2. Click on the First Time User? Click Here link in the Sign In box. You will see the New Member Sign Up page. 3. Enter your Everyday.me Access Code exactly as it appears below. You will not need to use this code after youve completed the sign-up process. If you do not sign up before the expiration date, you must request a new code. · [email or web address] Access Code: [email or web address] Expires: 10/21/2017  2:32 PM 
 
4. Enter the last four digits of your Social Security Number (xxxx) and Date of Birth (mm/dd/yyyy) as indicated and click Submit. You will be taken to the next sign-up page. 5. Create a Bauzaart ID. This will be your Everyday.me login ID and cannot be changed, so think of one that is secure and easy to remember. 6. Create a Everyday.me password. You can change your password at any time. 7. Enter your Password Reset Question and Answer. This can be used at a later time if you forget your password. 8. Enter your e-mail address. You will receive e-mail notification when new information is available in [address] 9. Click Sign Up. You can now view and download portions of your medical record. 10. Click the Download Summary menu link to download a portable copy of your medical information. If you have questions, please visit the Frequently Asked Questions section of the [employer] website. Remember, [employer] is NOT to be used for urgent needs. For medical emergencies, dial 911. Now available from your iPhone and Android! Please provide this summary of care documentation to your next provider. Your primary care clinician is listed as Gabino Barnett. If you have any questions after today's visit, please call [phone].

## 2017-08-23 NOTE — PROGRESS NOTES
1. Have you been to the ER, urgent care clinic since your last visit? Hospitalized since your last visit? Yes Where: [facility] 8/20 fall    2. Have you seen or consulted any other health care providers outside of the [address] [place] [address] since your last visit? Include any pap smears or colon screening. No      Health Maintenance Due   Topic Date Due    Pneumococcal 65+ Low/Medium Risk (2 of 2 - PPSV23) 01/01/2013    GLAUCOMA SCREENING Q2Y  09/18/2016    INFLUENZA AGE 9 TO ADULT  08/01/2017       Please see Red banners under Allergies, Med rec, Immunizations to remove outside inquires. All correct information has been verified with patient and added to chart.

## 2017-08-23 NOTE — PROGRESS NOTES
Assessment/Plan:    1. Fall, initial encounter  -discussed removing rugs, wearing secure footing in the house (not flip flops)    2. Periorbital ecchymosis of right eye, initial encounter    3. Laceration of arm, right, initial encounter  -s/p steri strips    4. Depression and anxiety - has failed effexor, wellbutrin, zoloft, prozac, lexapro secondary to side effects. Advised against use of benzos. The plan was discussed with the patient. The patient verbalized understanding and is in agreement with the plan. All medication potential side effects were discussed with the patient. Health Maintenance:   Health Maintenance   Topic Date Due    Pneumococcal 65+ Low/Medium Risk (2 of 2 - PPSV23) 01/01/2013    GLAUCOMA SCREENING Q2Y  09/18/2016    INFLUENZA AGE 9 TO ADULT  08/01/2017    MEDICARE YEARLY EXAM  09/27/2017    DTaP/Tdap/Td series (2 - Td) 09/01/2026    OSTEOPOROSIS SCREENING (DEXA)  Completed    ZOSTER VACCINE AGE 60>  Addressed       Brigitte Mcmullen is a 80 y.o. female and presents with Hospital Follow Up (8/20 Depaul )     Subjective:  Was seen in ER 8/21 after a fall, where she hit her head, sustaining a contusion and skin tear to arm that steristrips were placed on. States she was walking in her house, was wearing flip flops and tripped on a rug. She fell into the wall. No vision changes, HA, confusion, dizziness. She does take benzos sparingly. She c/o ongoing depression and anxiety. Failed effexor, zoloft, wellbutrin, prozac. She doesn't want to change meds at this time. She's going to counseling. ROS:  Constitutional: No recent weight change. No weakness/fatigue. No f/c. HENT: No HA, dizziness. No hearing loss/tinnitus. No nasal congestion/discharge. Eyes: No change in vision, double/blurred vision or eye pain/redness. Cardiovascular: No CP/palpitations. No RAYA/orthopnea/PND. Respiratory: No cough/sputum, dyspnea, wheezing. Gastointestinal: No dysphagia, reflux. No n/v. No constipation/diarrhea. No melena/rectal bleeding. Neurological: No seizures/numbness/weakness. No paresthesias. The problem list was updated as a part of today's visit. Patient Active Problem List   Diagnosis Code    Depression F32.9    HLD (hyperlipidemia) E78.5    Osteopenia M85.80    Diverticulosis K57.90    Allergic rhinitis J30.9    Hearing loss of both ears H91.93    Dysgeusia R43.2    Sleep apnea G47.30    OA (osteoarthritis) M19.90    PND (post-nasal drip) R09.82    Anxiety F41.9    Nephrolithiasis N20.0    IBS (irritable bowel syndrome) K58.9    High cholesterol E78.00    Cerebral microvascular disease I67.9    CKD (chronic kidney disease) stage 3, GFR 30-59 ml/min N18.3    DNR (do not resuscitate) Z73    ESTHER (obstructive sleep apnea) G47.33    Advance care planning Z71.89    Chronic right shoulder pain M25.511, G89.29    TIA (transient ischemic attack) G45.9    Transient cerebral ischemia G45.9       The PSH, FH were reviewed. SH:  Social History   Substance Use Topics    Smoking status: Never Smoker    Smokeless tobacco: Never Used    Alcohol use No       Medications/Allergies:  Current Outpatient Prescriptions on File Prior to Visit   Medication Sig Dispense Refill    fluticasone (FLONASE) 50 mcg/actuation nasal spray 2 Sprays by Both Nostrils route daily. 3 Bottle 3    dicyclomine (BENTYL) 10 mg capsule Take 1 Cap by mouth three (3) times daily as needed. 90 Cap 1    citalopram (CELEXA) 40 mg tablet TAKE ONE TABLET BY MOUTH EVERY DAY 90 Tab 1     No current facility-administered medications on file prior to visit.          Allergies   Allergen Reactions    Other Medication Anaphylaxis     MRI Dye    Ciprofloxacin Anxiety    Epinephrine Palpitations    Ivp [Fd And C Blue No.1] Swelling     Facial swelling many years ago, now only has oral contrast    Macrobid [Nitrofurantoin Monohyd/M-Cryst] Other (comments)     Whole body starts shaking       Other Medication Palpitations    Prednisone Nausea and Vomiting       Objective:  Visit Vitals    /68 (BP 1 Location: Left arm, BP Patient Position: Sitting)    Pulse 63    Temp 98.1 °F (36.7 °C) (Oral)    Resp 20    Ht 5' 3\" (1.6 m)    Wt 158 lb 12.8 oz (72 kg)    SpO2 96%    BMI 28.13 kg/m2      Constitutional: Well developed, nourished, no distress, alert   Eyes: Conjunctiva normal. PERRL. +R periorbital ecchymoses   CV: S1, S2.  RRR. No murmurs/rubs. No thrills palpated. No carotid bruits. Intact distal pulses. No edema. Pulm: No abnormalities on inspection. Clear to auscultation bilaterally. No wheezing/rhonchi. Normal effort. Neuro: A/O x 3. No focal motor or sensory deficits. Speech normal.   Psych: Appropriate affect, judgement and insight. Short-term memory intact.      Skin: R arm ecchymoses

## 2017-08-24 ENCOUNTER — TELEPHONE (OUTPATIENT)
Dept: FAMILY MEDICINE CLINIC | Age: 82
End: 2017-08-24

## 2017-08-24 NOTE — TELEPHONE ENCOUNTER
Pt called she was seen yesterday but forgot to discuss her cough with Dr. Indy Arias, she is concerned that it may still be the bronchitis she had previously and would like to know if she can do another course of Antibiotics. Please advise.

## 2017-08-25 NOTE — TELEPHONE ENCOUNTER
Patient stated that her cough is better and she dont feel like coming back in the office.  Advised patient to contact office if she started to feel bad again

## 2017-09-01 ENCOUNTER — DOCUMENTATION ONLY (OUTPATIENT)
Dept: FAMILY MEDICINE CLINIC | Age: 82
End: 2017-09-01

## 2017-09-01 ENCOUNTER — TELEPHONE (OUTPATIENT)
Dept: FAMILY MEDICINE CLINIC | Age: 82
End: 2017-09-01

## 2017-09-01 NOTE — PROGRESS NOTES
Pt called reporting dizziness and whether she needed to be seen. Called pt back. Seen 8/23/17 for fall, not dizzy at that time. Pt reports this just came on this morning, no falls, no appetite decrease. Pt advised per Dr. Johnson Rather to take OTC Meclizine and call us back to update on symptoms. Pt advised to seek urgent care if symptoms become worse.

## 2017-09-01 NOTE — TELEPHONE ENCOUNTER
Pt feeling dizzy and concerned she'll fall again (fell on 8/20). Pt went back to bed and it did not help. Please advise if she can be worked in today or not.

## 2017-09-07 NOTE — TELEPHONE ENCOUNTER
Pt was advised to take Meclizine OTC for her dizziness over the weekend and to seek urgent care if not resolving. Called pt to follow up. She is still experiencing dizzy spells but feels concerned about adding another medication. She did state her arm is healing up and she still has a bump on her head from a previous fall. She states she will now try the Meclinizine at night. She also states she continues to feel \"emotional\" and did start seeing a counselor yesterday.

## 2017-09-26 ENCOUNTER — TELEPHONE (OUTPATIENT)
Dept: FAMILY MEDICINE CLINIC | Age: 82
End: 2017-09-26

## 2017-09-26 NOTE — TELEPHONE ENCOUNTER
Pt called because she has had ibs for the last three days and she scheduled with Dr Viraj Masters tomorrow at 2:00pm and [facility] There was already a referral generated back in October 2013.      Phone [phone]  Fax [phone]

## 2017-09-27 RX ORDER — DICYCLOMINE HYDROCHLORIDE 10 MG/1
CAPSULE ORAL
Qty: 90 CAP | Refills: 0 | Status: SHIPPED | OUTPATIENT
Start: 2017-09-27 | End: 2019-05-23 | Stop reason: SDUPTHER

## 2017-10-24 ENCOUNTER — TELEPHONE (OUTPATIENT)
Dept: FAMILY MEDICINE CLINIC | Age: 82
End: 2017-10-24

## 2017-10-24 NOTE — TELEPHONE ENCOUNTER
Pt called stating she needs a referral for her psychologist. Pt states she has been going regularly and was unaware that she needed a referral.    Dr. Vee Tran at [facility] on [address]? [phone]    Pt is unsure of any other information.  She states she thinks the Doctor has a practice but that she sees her at her [facility]

## 2017-10-24 NOTE — TELEPHONE ENCOUNTER
Dr. Aimee Block May [id number] [employer]  p [address] [place] [phone]. Please add referral for psychology.

## 2017-11-03 DIAGNOSIS — F32.1 MODERATE SINGLE CURRENT EPISODE OF MAJOR DEPRESSIVE DISORDER (HCC): ICD-10-CM

## 2017-11-06 RX ORDER — CITALOPRAM 40 MG/1
TABLET, FILM COATED ORAL
Qty: 90 TAB | Refills: 1 | Status: SHIPPED | OUTPATIENT
Start: 2017-11-06 | End: 2017-11-21 | Stop reason: ALTCHOICE

## 2017-11-17 RX ORDER — TRAMADOL HYDROCHLORIDE 50 MG/1
50 TABLET ORAL
Qty: 20 TAB | Refills: 0 | Status: SHIPPED | OUTPATIENT
Start: 2017-11-17 | End: 2018-01-18 | Stop reason: ALTCHOICE

## 2017-11-17 NOTE — TELEPHONE ENCOUNTER
Pt called stating \" I have called I don't know how many times to get my Tramadol refilled, I don't know what the problem is\"    I explained that I do not see any phone calls or requests in the computer. Pt states she has 1 pill left and uses the medication when she has an IBS \"attack\".  Please advise, medication is marked as d/c so I was unable to select or pend

## 2017-11-21 ENCOUNTER — OFFICE VISIT (OUTPATIENT)
Dept: FAMILY MEDICINE CLINIC | Age: 82
End: 2017-11-21

## 2017-11-21 VITALS
HEIGHT: 63 IN | WEIGHT: 157.6 LBS | TEMPERATURE: 98.6 F | DIASTOLIC BLOOD PRESSURE: 60 MMHG | BODY MASS INDEX: 27.93 KG/M2 | HEART RATE: 60 BPM | RESPIRATION RATE: 17 BRPM | SYSTOLIC BLOOD PRESSURE: 118 MMHG | OXYGEN SATURATION: 98 %

## 2017-11-21 DIAGNOSIS — Z00.00 MEDICARE ANNUAL WELLNESS VISIT, SUBSEQUENT: Primary | ICD-10-CM

## 2017-11-21 DIAGNOSIS — F32.1 MODERATE SINGLE CURRENT EPISODE OF MAJOR DEPRESSIVE DISORDER (HCC): ICD-10-CM

## 2017-11-21 DIAGNOSIS — F32.A DEPRESSION, UNSPECIFIED DEPRESSION TYPE: ICD-10-CM

## 2017-11-21 RX ORDER — SERTRALINE HYDROCHLORIDE 50 MG/1
50 TABLET, FILM COATED ORAL DAILY
Qty: 30 TAB | Refills: 1 | Status: SHIPPED | OUTPATIENT
Start: 2017-11-21 | End: 2017-11-21 | Stop reason: ALTCHOICE

## 2017-11-21 RX ORDER — CITALOPRAM 40 MG/1
TABLET, FILM COATED ORAL
Qty: 90 TAB | Refills: 1 | Status: SHIPPED | OUTPATIENT
Start: 2017-11-21 | End: 2018-03-05 | Stop reason: ALTCHOICE

## 2017-11-21 NOTE — PROGRESS NOTES
After pt left the office, she returned stating she forgot to tell me the celexa \"isn't working\". We had increased dose 3 weeks ago b/c uncontrolled depression/anxiety sx. Has previously failed wellbutrin and effexor. I advised her I would start zoloft (stop celexa) AFTER she returns from her travels. However, she declined this change and wishes to remain on celexa.

## 2017-11-21 NOTE — PROGRESS NOTES
This is a Subsequent Medicare Annual Wellness Exam (AWV) (Performed 12 months after IPPE or effective date of Medicare Part B enrollment)    I have reviewed the patient's medical history in detail and updated the computerized patient record. 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    Diverticula of colon     Gastrointestinal disorder     High cholesterol     IBS (irritable bowel syndrome)     Kidney stone     Other ill-defined conditions(799.89)     heart murmur    Psychiatric disorder     anxiety    Skin cancer     Stroke [facility])       Past Surgical History:   Procedure Laterality Date    HX GYN  1970s    bladder tack in [facility]     HX OTHER SURGICAL  1980    cystocele    HX UROLOGICAL      bladder tac     Current Outpatient Prescriptions   Medication Sig Dispense Refill    traMADol (ULTRAM) 50 mg tablet Take 1 Tab by mouth daily as needed for Pain. 20 Tab 0    citalopram (CELEXA) 40 mg tablet TAKE ONE TABLET BY MOUTH EVERY DAY 90 Tab 1    dicyclomine (BENTYL) 10 mg capsule TAKE ONE CAPSULE BY MOUTH THREE TIMES A DAY AS NEEDED 90 Cap 0    DM/P-EPHED/ACETAMINOPH/DOXYLAM (NYQUIL PO) Take  by mouth.  fluticasone (FLONASE) 50 mcg/actuation nasal spray 2 Sprays by Both Nostrils route daily.  3 Bottle 3     Allergies   Allergen Reactions    Other Medication Anaphylaxis     MRI Dye    Ciprofloxacin Anxiety    Epinephrine Palpitations    Ivp [Fd And C Blue No.1] Swelling     Facial swelling many years ago, now only has oral contrast    Macrobid [Nitrofurantoin Monohyd/M-Cryst] Other (comments)     Whole body starts shaking       Other Medication Palpitations    Prednisone Nausea and Vomiting     Family History   Problem Relation Age of Onset    Heart Attack Father     Heart Attack Brother      Social History   Substance Use Topics    Smoking status: Never Smoker    Smokeless tobacco: Never Used    Alcohol use No Patient Active Problem List   Diagnosis Code    Depression F32.9    HLD (hyperlipidemia) E78.5    Osteopenia M85.80    Diverticulosis K57.90    Allergic rhinitis J30.9    Hearing loss of both ears H91.93    Sleep apnea G47.30    OA (osteoarthritis) M19.90    PND (post-nasal drip) R09.82    Anxiety F41.9    Nephrolithiasis N20.0    IBS (irritable bowel syndrome) K58.9    Cerebral microvascular disease I67.9    CKD (chronic kidney disease) stage 3, GFR 30-59 ml/min N18.3    DNR (do not resuscitate) Z66    ESTHER (obstructive sleep apnea) G47.33    Advance care planning Z71.89    Chronic right shoulder pain M25.511, G89.29    Transient cerebral ischemia G45.9       Depression Risk Factor Screening:     PHQ over the last two weeks 11/21/2017   PHQ Not Done Active Diagnosis of Depression or Bipolar Disorder     Alcohol Risk Factor Screening: You do not drink alcohol or very rarely. Functional Ability and Level of Safety:   Hearing LossHearing is good. Activities of Daily Living  The home contains: no safety equipment. Patient does total self care    Fall Risk  Fall Risk Assessment, last 12 mths 11/21/2017   Able to walk? Yes   Fall in past 12 months? Yes   Fall with injury?  Yes   Number of falls in past 12 months 1   Fall Risk Score 2     Abuse Screen  Patient is not abused    Cognitive Screening   Evaluation of Cognitive Function:  Has your family/caregiver stated any concerns about your memory: no  Normal    Patient Care Team   Patient Care Team:  Kvng Capone MD as PCP - General (Internal Medicine)  Johnny Nesbitt MD as Consulting Provider (Gastroenterology)  Kvng Capone MD (Internal Medicine)  Torey Grissom RN as Ambulatory Care Navigator    Assessment/Plan   Education and counseling provided:  Are appropriate based on today's review and evaluation  End-of-Life planning (with patient's consent)  Influenza Vaccine  Advance Care Planning (ACP) Provider Conversation Snapshot    Date of ACP Conversation: 11/21/17  Persons included in Conversation:  patient  Length of ACP Conversation in minutes:  <16 minutes (Non-Billable)    Authorized Decision Maker (if patient is incapable of making informed decisions): This person is:   Healthcare Agent/Medical Power of  under Advance Directive    Conversation Outcomes / Follow-Up Plan:   Reviewed existing Advance Directive     Diagnoses and all orders for this visit:    1. Medicare annual wellness visit, subsequent  -     METABOLIC PANEL, COMPREHENSIVE; Future  -     LIPID PANEL; Future  -     CBC W/O DIFF;  Future      Health Maintenance Due   Topic Date Due    Pneumococcal 65+ Low/Medium Risk (2 of 2 - PPSV23) 01/01/2013    GLAUCOMA SCREENING Q2Y  01/01/2018

## 2017-11-21 NOTE — MR AVS SNAPSHOT
Visit Information Date & Time Provider Department Dept. Phone Encounter #  
 11/21/2017  9:15 AM Kristine Luna, [employer] [facility] [phone] [id number] Follow-up Instructions Return in about 6 months (around 5/21/2018). Follow-up and Disposition History Upcoming Health Maintenance Date Due Pneumococcal 65+ Low/Medium Risk (2 of 2 - PPSV23) 1/1/2013 GLAUCOMA SCREENING Q2Y 1/1/2018 MEDICARE YEARLY EXAM 11/22/2018 DTaP/Tdap/Td series (2 - Td) 9/1/2026 Allergies as of 11/21/2017  Review Complete On: 11/21/2017 By: Kristine Luna MD  
  
 Severity Noted Reaction Type Reactions Other Medication High 07/21/2014    Anaphylaxis MRI Dye  
 Ciprofloxacin  08/20/2017    Anxiety Epinephrine  11/19/2010    Palpitations Ivp [Fd And C Blue No.1]  11/19/2010    Swelling Facial swelling many years ago, now only has oral contrast  
 Macrobid [Nitrofurantoin Monohyd/m-cryst]  09/01/2016    Other (comments) Whole body starts shaking Other Medication  11/19/2010    Palpitations Prednisone  01/28/2011    Nausea and Vomiting Current Immunizations  Reviewed on 6/26/2014 Name Date Influenza High Dose Vaccine PF 9/1/2016 12:05 PM, 9/15/2015 Influenza Vaccine 9/20/2014 Influenza Vaccine Whole 9/1/2010 ZZZ-RETIRED (DO NOT USE) Pneumococcal Vaccine (Unspecified Type) 1/1/2008 Not reviewed this visit You Were Diagnosed With   
  
 Codes Comments Medicare annual wellness visit, subsequent    -  Primary ICD-10-CM: Z00.00 ICD-9-CM: V70.0 Vitals BP Pulse Temp Resp Height(growth percentile) Weight(growth percentile)  
 118/60 (BP 1 Location: Left arm, BP Patient Position: Sitting) 60 98.6 °F (37 °C) (Oral) 17 5' 3\" (1.6 m) 157 lb 9.6 oz (71.5 kg) SpO2 BMI OB Status Smoking Status 98% 27.92 kg/m2 Postmenopausal Never Smoker Vitals History BMI and BSA Data Body Mass Index Body Surface Area 27.92 kg/m 2 1.78 m 2 Preferred Pharmacy Pharmacy Name Phone [facility] [place] [address] [phone] Your Updated Medication List  
  
   
This list is accurate as of: 11/21/17  9:37 AM.  Always use your most recent med list.  
  
  
  
  
 citalopram 40 mg tablet Commonly known as:  CELEXA  
TAKE ONE TABLET BY MOUTH EVERY DAY  
  
 dicyclomine 10 mg capsule Commonly known as:  BENTYL TAKE ONE CAPSULE BY MOUTH THREE TIMES A DAY AS NEEDED  
  
 fluticasone 50 mcg/actuation nasal spray Commonly known as:  Becca Enriquez 2 Sprays by Both Nostrils route daily. NYQUIL PO Take  by mouth. traMADol 50 mg tablet Commonly known as:  ULTRAM  
Take 1 Tab by mouth daily as needed for Pain. Follow-up Instructions Return in about 6 months (around 5/21/2018). To-Do List   
 11/21/2017 Lab:  CBC W/O DIFF   
  
 11/21/2017 Lab:  LIPID PANEL   
  
 11/21/2017 Lab:  METABOLIC PANEL, COMPREHENSIVE Patient Instructions Medicare Wellness Visit, Female The best way to live healthy is to have a healthy lifestyle by eating a well-balanced diet, exercising regularly, limiting alcohol and stopping smoking. Regular physical exams and screening tests are another way to keep healthy. Preventive exams provided by your health care provider can find health problems before they become diseases or illnesses. Preventive services including immunizations, screening tests, monitoring and exams can help you take care of your own health. All people over age 72 should have a pneumovax  and and a prevnar shot to prevent pneumonia. These are once in a lifetime unless you and your provider decide differently. All people over 65 should have a yearly flu shot and a tetanus vaccine every 10 years. A bone mass density to screen for osteoporosis or thinning of the bones should be done every 2 years after 65. Screening for diabetes mellitus with a blood sugar test should be done every year. Glaucoma is a disease of the eye due to increased ocular pressure that can lead to blindness and it should be done every year by an eye professional. 
 
Cardiovascular screening tests that check for elevated lipids (fatty part of blood) which can lead to heart disease and strokes should be done every 5 years. Colorectal screening that evaluates for blood or polyps in your colon should be done yearly as a stool test or every five years as a flexible sigmoidoscope or every 10 years as a colonoscopy up to age 76. Breast cancer screening with a mammogram is recommended biennially  for women age 54-69. Screening for cervical cancer with a pap smear and pelvic exam is recommended for women after age 72 years every 2 years up to age 79 or when the provider and patient decide to stop. If there is a history of cervical abnormalities or other increased risk for cancer then the test is recommended yearly. Hepatitis C screening is also recommended for anyone born between 80 through [address]. A shingles vaccine is also recommended once in a lifetime after age 61. Your Medicare Wellness Exam is recommended annually. Here is a list of your current Health Maintenance items with a due date: 
Health Maintenance Due Topic Date Due  Pneumococcal Vaccine (2 of 2 - PPSV23) 01/01/2013  Glaucoma Screening   09/18/2016  Flu Vaccine  08/01/2017 Aruna Hernandez Annual Well Visit  09/27/2017 Introducing [facility]! Bairon Amaro introduces [employer] patient portal. Now you can access parts of your medical record, email your doctor's office, and request medication refills online. 1. In your internet browser, go to [email or web address] 2. Click on the First Time User? Click Here link in the Sign In box. You will see the New Member Sign Up page. 3. Enter your [employer] Access Code exactly as it appears below. You will not need to use this code after youve completed the sign-up process. If you do not sign up before the expiration date, you must request a new code. · [employer] Access Code: [email or web address] Expires: 2/19/2018  9:37 AM 
 
4. Enter the last four digits of your Social Security Number (xxxx) and Date of Birth (mm/dd/yyyy) as indicated and click Submit. You will be taken to the next sign-up page. 5. Create a [employer] ID. This will be your [employer] login ID and cannot be changed, so think of one that is secure and easy to remember. 6. Create a [employer] password. You can change your password at any time. 7. Enter your Password Reset Question and Answer. This can be used at a later time if you forget your password. 8. Enter your e-mail address. You will receive e-mail notification when new information is available in [address] 9. Click Sign Up. You can now view and download portions of your medical record. 10. Click the Download Summary menu link to download a portable copy of your medical information. If you have questions, please visit the Frequently Asked Questions section of the [employer] website. Remember, [employer] is NOT to be used for urgent needs. For medical emergencies, dial 911. Now available from your iPhone and Android! Please provide this summary of care documentation to your next provider. Your primary care clinician is listed as [address]. If you have any questions after today's visit, please call [phone].

## 2017-11-21 NOTE — PROGRESS NOTES
Liza Aguilera is a 80 y.o. female (: 3/13/1931) presenting to address:    Chief Complaint   Patient presents with    Annual Wellness Visit       Vitals:    17 0919   BP: 118/60   Pulse: 60   Resp: 17   Temp: 98.6 °F (37 °C)   TempSrc: Oral   SpO2: 98%   Weight: 157 lb 9.6 oz (71.5 kg)   Height: 5' 3\" (1.6 m)   PainSc:   0 - No pain       Hearing/Vision:      Visual Acuity Screening    Right eye Left eye Both eyes   Without correction: 20/70 20/40 20/40   With correction:          Learning Assessment:     Learning Assessment 2015   PRIMARY LEARNER Patient   HIGHEST LEVEL OF EDUCATION - PRIMARY LEARNER  SOME COLLEGE   BARRIERS PRIMARY LEARNER HEARING   CO-LEARNER CAREGIVER No   PRIMARY LANGUAGE ENGLISH    NEED -   LEARNER PREFERENCE PRIMARY READING   LEARNING SPECIAL TOPICS -   ANSWERED BY self   RELATIONSHIP SELF     Depression Screening:     PHQ over the last two weeks 2017   PHQ Not Done Active Diagnosis of Depression or Bipolar Disorder     Fall Risk Assessment:     Fall Risk Assessment, last 12 mths 2017   Able to walk? Yes   Fall in past 12 months? Yes   Fall with injury? Yes   Number of falls in past 12 months 1   Fall Risk Score 2     Abuse Screening:     Abuse Screening Questionnaire 2017   Do you ever feel afraid of your partner? N   Are you in a relationship with someone who physically or mentally threatens you? N   Is it safe for you to go home? Y     Coordination of Care Questionaire:   1. Have you been to the ER, urgent care clinic since your last visit? Hospitalized since your last visit? NO    2. Have you seen or consulted any other health care providers outside of the [address] [place] [address] since your last visit? Include any pap smears or colon screening. YES 17 Dentist    Advanced Directive:   1. Do you have an Advanced Directive? YES    2. Would you like information on Advanced Directives?  NO

## 2017-11-21 NOTE — PATIENT INSTRUCTIONS

## 2017-11-27 ENCOUNTER — TELEPHONE (OUTPATIENT)
Dept: FAMILY MEDICINE CLINIC | Age: 82
End: 2017-11-27

## 2017-11-27 NOTE — TELEPHONE ENCOUNTER
Pt called stating that [facility] emailed her about seeing Dr. Smith. Stating they don't have referral.   Did mention 10/4/17 as her first visit with Dr. Smith and it's possible that the auth needed to be back dated for that date. Scheduled with her again on 11/29/17 wanting to make sure referral is still good for that appointment.

## 2017-11-28 NOTE — TELEPHONE ENCOUNTER
Called Dr Whittington Mikey office. She is no longer there. Office will call back if any other therapist can see patient.

## 2018-01-13 ENCOUNTER — APPOINTMENT (OUTPATIENT)
Dept: GENERAL RADIOLOGY | Age: 83
End: 2018-01-13
Attending: EMERGENCY MEDICINE
Payer: MEDICARE

## 2018-01-13 ENCOUNTER — APPOINTMENT (OUTPATIENT)
Dept: CT IMAGING | Age: 83
End: 2018-01-13
Attending: EMERGENCY MEDICINE
Payer: MEDICARE

## 2018-01-13 ENCOUNTER — HOSPITAL ENCOUNTER (EMERGENCY)
Age: 83
Discharge: HOME | End: 2018-01-13
Attending: EMERGENCY MEDICINE
Payer: MEDICARE

## 2018-01-13 VITALS
DIASTOLIC BLOOD PRESSURE: 71 MMHG | WEIGHT: 158 LBS | SYSTOLIC BLOOD PRESSURE: 135 MMHG | HEIGHT: 63 IN | TEMPERATURE: 98.4 F | BODY MASS INDEX: 28 KG/M2 | HEART RATE: 65 BPM | RESPIRATION RATE: 16 BRPM | OXYGEN SATURATION: 100 %

## 2018-01-13 DIAGNOSIS — M25.461 KNEE EFFUSION, RIGHT: ICD-10-CM

## 2018-01-13 DIAGNOSIS — S00.33XA NASAL SEPTAL HEMATOMA, INITIAL ENCOUNTER: ICD-10-CM

## 2018-01-13 DIAGNOSIS — S02.2XXA CLOSED FRACTURE OF NASAL BONE, INITIAL ENCOUNTER: ICD-10-CM
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
## 2018-01-13 LAB
ANION GAP SERPL CALC-SCNC: 7 MMOL/L (ref 3–18)
APPEARANCE UR: CLEAR
ATRIAL RATE: 73 BPM
BACTERIA URNS QL MICRO: ABNORMAL /HPF
BASOPHILS # BLD: 0 K/UL (ref 0–0.06)
BASOPHILS NFR BLD: 1 % (ref 0–2)
BILIRUB UR QL: NEGATIVE
BUN SERPL-MCNC: 20 MG/DL (ref 7–18)
BUN/CREAT SERPL: 25 (ref 12–20)
CALCIUM SERPL-MCNC: 9 MG/DL (ref 8.5–10.1)
CALCULATED P AXIS, ECG09: 58 DEGREES
CALCULATED R AXIS, ECG10: 35 DEGREES
CALCULATED T AXIS, ECG11: 54 DEGREES
CHLORIDE SERPL-SCNC: 107 MMOL/L (ref 100–108)
CO2 SERPL-SCNC: 27 MMOL/L (ref 21–32)
COLOR UR: YELLOW
CREAT SERPL-MCNC: 0.79 MG/DL (ref 0.6–1.3)
DIAGNOSIS, 93000: NORMAL
DIFFERENTIAL METHOD BLD: ABNORMAL
EOSINOPHIL # BLD: 0.1 K/UL (ref 0–0.4)
EOSINOPHIL NFR BLD: 2 % (ref 0–5)
EPITH CASTS URNS QL MICRO: ABNORMAL /LPF (ref 0–5)
ERYTHROCYTE [DISTWIDTH] IN BLOOD BY AUTOMATED COUNT: 13.3 % (ref 11.6–14.5)
GLUCOSE SERPL-MCNC: 95 MG/DL (ref 74–99)
GLUCOSE UR STRIP.AUTO-MCNC: NEGATIVE MG/DL
HCT VFR BLD AUTO: 38 % (ref 35–45)
HGB BLD-MCNC: 12.7 G/DL (ref 12–16)
HGB UR QL STRIP: NEGATIVE
KETONES UR QL STRIP.AUTO: NEGATIVE MG/DL
LEUKOCYTE ESTERASE UR QL STRIP.AUTO: ABNORMAL
LYMPHOCYTES # BLD: 2 K/UL (ref 0.9–3.6)
LYMPHOCYTES NFR BLD: 27 % (ref 21–52)
MCH RBC QN AUTO: 31.7 PG (ref 24–34)
MCHC RBC AUTO-ENTMCNC: 33.4 G/DL (ref 31–37)
MCV RBC AUTO: 94.8 FL (ref 74–97)
MONOCYTES # BLD: 0.7 K/UL (ref 0.05–1.2)
MONOCYTES NFR BLD: 9 % (ref 3–10)
NEUTS SEG # BLD: 4.7 K/UL (ref 1.8–8)
NEUTS SEG NFR BLD: 61 % (ref 40–73)
NITRITE UR QL STRIP.AUTO: NEGATIVE
P-R INTERVAL, ECG05: 158 MS
PH UR STRIP: 5 [PH] (ref 5–8)
PLATELET # BLD AUTO: 202 K/UL (ref 135–420)
PMV BLD AUTO: 11.1 FL (ref 9.2–11.8)
POTASSIUM SERPL-SCNC: 4.1 MMOL/L (ref 3.5–5.5)
PROT UR STRIP-MCNC: NEGATIVE MG/DL
Q-T INTERVAL, ECG07: 414 MS
QRS DURATION, ECG06: 78 MS
QTC CALCULATION (BEZET), ECG08: 456 MS
RBC # BLD AUTO: 4.01 M/UL (ref 4.2–5.3)
RBC #/AREA URNS HPF: ABNORMAL /HPF (ref 0–5)
SODIUM SERPL-SCNC: 141 MMOL/L (ref 136–145)
SP GR UR REFRACTOMETRY: 1.03 (ref 1–1.03)
UROBILINOGEN UR QL STRIP.AUTO: 1 EU/DL (ref 0.2–1)
VENTRICULAR RATE, ECG03: 73 BPM
WBC # BLD AUTO: 7.5 K/UL (ref 4.6–13.2)
WBC URNS QL MICRO: ABNORMAL /HPF (ref 0–4)

## 2018-01-13 PROCEDURE — 70450 CT HEAD/BRAIN W/O DYE: CPT

## 2018-01-13 PROCEDURE — 80048 BASIC METABOLIC PNL TOTAL CA: CPT | Performed by: EMERGENCY MEDICINE

## 2018-01-13 PROCEDURE — 81001 URINALYSIS AUTO W/SCOPE: CPT | Performed by: EMERGENCY MEDICINE

## 2018-01-13 PROCEDURE — 72125 CT NECK SPINE W/O DYE: CPT

## 2018-01-13 PROCEDURE — 70486 CT MAXILLOFACIAL W/O DYE: CPT

## 2018-01-13 PROCEDURE — 99285 EMERGENCY DEPT VISIT HI MDM: CPT

## 2018-01-13 PROCEDURE — 85025 COMPLETE CBC W/AUTO DIFF WBC: CPT | Performed by: EMERGENCY MEDICINE

## 2018-01-13 PROCEDURE — 73564 X-RAY EXAM KNEE 4 OR MORE: CPT

## 2018-01-13 PROCEDURE — 74011250637 HC RX REV CODE- 250/637: Performed by: EMERGENCY MEDICINE

## 2018-01-13 PROCEDURE — 71046 X-RAY EXAM CHEST 2 VIEWS: CPT

## 2018-01-13 PROCEDURE — 93005 ELECTROCARDIOGRAM TRACING: CPT

## 2018-01-13 RX ORDER — ACETAMINOPHEN 500 MG
1000 TABLET ORAL
Status: COMPLETED | OUTPATIENT
Start: 2018-01-13 | End: 2018-01-13

## 2018-01-13 RX ADMIN — ACETAMINOPHEN 1000 MG: 500 TABLET ORAL at 11:08

## 2018-01-13 NOTE — ED PROVIDER NOTES
EMERGENCY DEPARTMENT HISTORY AND PHYSICAL EXAM    9:24 AM      Date: 1/13/2018  Patient Name: Lamar Brooks [facility]    History of Presenting Illness     Chief Complaint   Patient presents with    Fall         History Provided By: Patient    Chief Complaint: Facial bruising, generalized soreness and fatigue  Duration:  Days  (x1)  Timing:  Acute  Location: Face, neck, and right knee  Quality: Aching  Severity: N/A  Modifying Factors: Some relief with Tramadol and acetaminophen   Associated Symptoms: Denies CP and SOB      Additional History (Context): Ulices Dave is a 80 y.o. female with stroke, skin CA, IBS, and anxiety who presents to the ED for evaluation of facial bruising associated with general fatigue and body soreness following a mechanical fall yesterday. Pt states she was walking on a treadmill yesterday at a recreational center when it began going too fast. Pt states she fell off due to the speed. EMS was called at the time of the fall however at the time the pt did not feel the need to come to the ED. Pt denies CP and SOB during the time of this fall. Pt woke up this morning with generalized body soreness, fatigue, and extensive bruising and swelling to her face and right knee. Pt denies pain and HA at this time. . Pt took Tramadol last night and Acetaminophen this morning with some relief. Pt stated she was able to sleep last night, despite the discomfort. Pt denies pain at this time. Pt denies use of blood thinners. PCP: Emily Grissom MD    Current Facility-Administered Medications   Medication Dose Route Frequency Provider Last Rate Last Dose    acetaminophen (TYLENOL) tablet 1,000 mg  1,000 mg Oral [facility]          Current Outpatient Prescriptions   Medication Sig Dispense Refill    citalopram (CELEXA) 40 mg tablet TAKE ONE TABLET BY MOUTH EVERY DAY 90 Tab 1    traMADol (ULTRAM) 50 mg tablet Take 1 Tab by mouth daily as needed for Pain.  20 Tab 0    dicyclomine (BENTYL) 10 mg capsule TAKE ONE CAPSULE BY MOUTH THREE TIMES A DAY AS NEEDED 90 Cap 0    DM/P-EPHED/ACETAMINOPH/DOXYLAM (NYQUIL PO) Take  by mouth.  fluticasone (FLONASE) 50 mcg/actuation nasal spray 2 Sprays by Both Nostrils route daily. 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    Diverticula of colon     Gastrointestinal disorder     High cholesterol     IBS (irritable bowel syndrome)     Kidney stone     Other ill-defined conditions(799.89)     heart murmur    Psychiatric disorder     anxiety    Skin cancer     Stroke [facility])        Past Surgical History:  Past Surgical History:   Procedure Laterality Date    HX GYN  1970s    bl[facility] tack [facility]     HX OTHER SURGICAL  1980    cystocele    HX UROLOGICAL      bladder tac       Family History:  Family History   Problem Relation Age of Onset    Heart Attack Father     Heart Attack Brother        Social History:  Social History   Substance Use Topics    Smoking status: Never Smoker    Smokeless tobacco: Never Used    Alcohol use No       Allergies: Allergies   Allergen Reactions    Other Medication Anaphylaxis     MRI Dye    Ciprofloxacin Anxiety    Epinephrine Palpitations    Ivp [Fd And C Blue No.1] Swelling     Facial swelling many years ago, now only has oral contrast    Macrobid [Nitrofurantoin Monohyd/M-Cryst] Other (comments)     Whole body starts shaking       Other Medication Palpitations    Prednisone Nausea and Vomiting         Review of Systems     Review of Systems   Constitutional: Positive for fatigue. Negative for chills and fever. HENT: Positive for facial swelling. Negative for ear pain and sore throat. Eyes: Negative for pain and visual disturbance. Respiratory: Negative for cough and shortness of breath. Cardiovascular: Negative for chest pain and palpitations.    Gastrointestinal: Negative for abdominal pain, diarrhea, nausea and vomiting. Genitourinary: Negative for flank pain. Musculoskeletal: Negative for back pain and neck pain. Skin:        Bruising to face, right knee   Neurological: Positive for dizziness. Negative for syncope and headaches. Psychiatric/Behavioral: Negative for agitation. The patient is not nervous/anxious. Physical Exam     Visit Vitals    /88    Pulse 73    Temp 98.4 °F (36.9 °C)    Resp 16    Ht 5' 3\" (1.6 m)    Wt 71.7 kg (158 lb)    SpO2 99%    BMI 27.99 kg/m2     Physical Exam   Constitutional: She is oriented to person, place, and time. She appears well-developed and well-nourished. HENT:   Head: Normocephalic and atraumatic. Head is without Saha's sign. Right Ear: No hemotympanum. Left Ear: No hemotympanum. Nose: No nasal septal hematoma. Mouth/Throat: Oropharynx is clear and moist.   Bilateral orbital ecchymosis; nasal ecchymosis; bilateral nares are patent; nasal septum swollen   Eyes: EOM are normal. Pupils are equal, round, and reactive to light. No scleral icterus. Neck: Neck supple. No tracheal deviation present. Cardiovascular: Regular rhythm. No murmur heard. Pulmonary/Chest: Effort normal and breath sounds normal. No respiratory distress. Abdominal: Soft. There is no tenderness. Musculoskeletal: Normal range of motion. She exhibits no deformity. Right knee: She exhibits effusion and ecchymosis. She exhibits normal range of motion, no LCL laxity and no MCL laxity. Tenderness (Patella) found. Cervical back: She exhibits tenderness (Midline and paraspinal). Thoracic back: She exhibits no tenderness. Lumbar back: She exhibits no tenderness. Neurological: She is alert and oriented to person, place, and time. Gait normal.   No gross neuro deficit; 4/5 strength in all extremities   Skin: Skin is warm and dry.  Abrasion (Lower lip with bruising and swelling; bilateral shins) and bruising (Nasal and bilateral orbital ecchymosis) noted. No rash noted. She is not diaphoretic. Psychiatric: She has a normal mood and affect. Nursing note and vitals reviewed. Diagnostic Study Results     Labs -  Labs Reviewed   CBC WITH AUTOMATED DIFF - Abnormal; Notable for the following:        Result Value    RBC 4.01 (*)     All other components within normal limits   METABOLIC PANEL, BASIC - Abnormal; Notable for the following:     BUN 20 (*)     BUN/Creatinine ratio 25 (*)     All other components within normal limits   URINALYSIS W/ RFLX MICROSCOPIC - Abnormal; Notable for the following:     Leukocyte Esterase SMALL (*)     All other components within normal limits   URINE MICROSCOPIC ONLY - Abnormal; Notable for the following:     Bacteria 1+ (*)     All other components within normal limits      Radiologic Studies -   XR KNEE RT MIN 4 V   Final Result   IMPRESSION:  1. No acute osseous abnormality or malalignment involving the right knee. 2. Small right knee joint effusion with moderate prepatellar posttraumatic  bursitis. XR CHEST PA LAT   Final Result   IMPRESSION:  1. Underexpanded lungs without superimposed acute radiographic abnormality.      CT HEAD WO CONT      Final Results:   IMPRESSION:  1. No acute intracranial abnormality. 2. Minimally displaced nasal bone fractures with layering fluid within the left maxillary sinus   CT MAXILLOFACIAL WO CONT      Final Results   IMPRESSION:  1. Mildly angulated nasal bone fractures with adjacent paranasal soft tissue swelling. 2. No evidence of additional maxillofacial fracture. 3. Paranasal sinus disease as above. CT SPINE CERV WO CONT    Final Results:   IMPRESSION:   1. No evidence of fracture or acute traumatic subluxation involving the cervical spine. 2. Degenerative changes as described above. No evidence of high-grade osseous canal stenosis. Medical Decision Making   I am the first provider for this patient.     I reviewed the vital signs, available nursing notes, past medical history, past surgical history, family history and social history. Vital Signs-Reviewed the patient's vital signs. Pulse Oximetry Analysis -  99% on room air     Cardiac Monitor:  Rate: 76  Rhythm:  Normal Sinus Rhythm     EKG: Interpreted by the EP. Time Interpreted: 9:28 AM   Rate: 73   Rhythm: Normal Sinus Rhythm w/ PAC   Interpretation: normal axis, intervals within normal limits, no acut ST elevations or depressions   noted   Comparison:     Records Reviewed: Old Medical Records (Time of Review: 9:24 AM)    ED Course: Progress Notes, Reevaluation, and Consults:    Differential: fracture, sprain, strain, contusion, anemia, ICH    Provider Notes (Medical Decision Making):     81 yo female s/p mechanical fall. Presents for general medical assesment with severe facial and knee ecchymosis and edema. S/p mechanical fall on treadmill yesterday. No neurologic deficit, able to breath through nose. Will obtain CT Head, Face, and CSPine as well as knee xray and CXR. Tylenol for pain control. CBC, BMP, and UA as well as EKG for reported generalized fatigue/slight dizziness. Labs showed no significant abnormalities aside from mildly elevated BUN; EKG showed no ischemic changes    Imaging showed multiple nasal fractures. No nasal hematoma, symptomatic management and ENT follow-up. Pt will be discharged home with follow up instructions for ENT       Diagnosis     Clinical Impression:   1. Fall, initial encounter    2. Knee effusion, right    3. Nasal septal hematoma, initial encounter        Disposition: home    Follow-up Information     None           Patient's Medications   Start Taking    No medications on file   Continue Taking    CITALOPRAM (CELEXA) 40 MG TABLET    TAKE ONE TABLET BY MOUTH EVERY DAY    DICYCLOMINE (BENTYL) 10 MG CAPSULE    TAKE ONE CAPSULE BY MOUTH THREE TIMES A DAY AS NEEDED    DM/P-EPHED/ACETAMINOPH/DOXYLAM (NYQUIL PO)    Take  by mouth.     FLUTICASONE (FLONASE) 50 MCG/ACTUATION NASAL SPRAY    2 Sprays by Both Nostrils route daily. TRAMADOL (ULTRAM) 50 MG TABLET    Take 1 Tab by mouth daily as needed for Pain. These Medications have changed    No medications on file   Stop Taking    No medications on file     _______________________________    Attestations:  [address] [place] [address] acting as a scribe for and in the presence of Son Pimentel DO      January 13, 2018 at 9:24 AM       Provider Attestation:      I personally performed the services described in the documentation, reviewed the documentation, as recorded by the scribe in my presence, and it accurately and completely records my words and actions.  January 13, 2018 at 9:24 AM - Son Pimentel DO    ______________________________

## 2018-01-13 NOTE — ED NOTES
I have reviewed discharge instructions with patient. Patient verbalized understanding and has no further questions at this time. Education taught and patient verbalized understanding of education. Teach back method used. IV removed, catheter tip intact on removal.  Armband removed and shredded per patients request.    Patients pain decreased at time of discharge. Belongings given to patient. Patient discharged with herself to home.

## 2018-01-18 ENCOUNTER — OFFICE VISIT (OUTPATIENT)
Dept: FAMILY MEDICINE CLINIC | Age: 83
End: 2018-01-18

## 2018-01-18 VITALS
OXYGEN SATURATION: 95 % | HEIGHT: 63 IN | WEIGHT: 163 LBS | HEART RATE: 78 BPM | BODY MASS INDEX: 28.88 KG/M2 | RESPIRATION RATE: 17 BRPM | TEMPERATURE: 98 F | SYSTOLIC BLOOD PRESSURE: 126 MMHG | DIASTOLIC BLOOD PRESSURE: 70 MMHG
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
## 2018-01-18 DIAGNOSIS — R58 ECCHYMOSIS: ICD-10-CM

## 2018-01-18 DIAGNOSIS — S02.2XXA CLOSED FRACTURE OF NASAL BONE, INITIAL ENCOUNTER: Primary | ICD-10-CM

## 2018-01-18 NOTE — PROGRESS NOTES
Assessment/Plan:    1. Closed fracture of nasal bone, initial encounter    2. Fall, initial encounter    3. ecchymoses    The plan was discussed with the patient. The patient verbalized understanding and is in agreement with the plan. All medication potential side effects were discussed with the patient. Health Maintenance:   Health Maintenance   Topic Date Due    Pneumococcal 65+ Low/Medium Risk (2 of 2 - PPSV23) 01/01/2013    GLAUCOMA SCREENING Q2Y  01/01/2018    MEDICARE YEARLY EXAM  11/22/2018    DTaP/Tdap/Td series (2 - Td) 09/01/2026    OSTEOPOROSIS SCREENING (DEXA)  Completed    ZOSTER VACCINE AGE 60>  Addressed    Influenza Age 5 to Adult  Completed       Enoch Apple is a 80 y.o. female and presents with Fall (ER )     Subjective:  Pt recently sustained a fall from a treadmill on 1/13/18 in which she broke her nose. I reviewed the CT results, no acute cervical issues. Labs were unremarkable except some mild dehydration. She has bruising on face, leg. She states \"everything hurts\". ROS:  Constitutional: No recent weight change. No weakness/fatigue. No f/c. Cardiovascular: No CP/palpitations. No RAYA/orthopnea/PND. Respiratory: No cough/sputum, dyspnea, wheezing. Gastointestinal: No dysphagia, reflux. No n/v. No constipation/diarrhea. No melena/rectal bleeding. The problem list was updated as a part of today's visit.   Patient Active Problem List   Diagnosis Code    Depression F32.9    HLD (hyperlipidemia) E78.5    Osteopenia M85.80    Diverticulosis K57.90    Allergic rhinitis J30.9    Hearing loss of both ears H91.93    Sleep apnea G47.30    OA (osteoarthritis) M19.90    PND (post-nasal drip) R09.82    Anxiety F41.9    Nephrolithiasis N20.0    IBS (irritable bowel syndrome) K58.9    Cerebral microvascular disease I67.9    CKD (chronic kidney disease) stage 3, GFR 30-59 ml/min N18.3    DNR (do not resuscitate) Z66    ESTHER (obstructive sleep apnea) G47.33    Advance care planning Z71.89    Chronic right shoulder pain M25.511, G89.29    Transient cerebral ischemia G45.9       The PSH, FH were reviewed. SH:  Social History   Substance Use Topics    Smoking status: Never Smoker    Smokeless tobacco: Never Used    Alcohol use No       Medications/Allergies:  Current Outpatient Prescriptions on File Prior to Visit   Medication Sig Dispense Refill    citalopram (CELEXA) 40 mg tablet TAKE ONE TABLET BY MOUTH EVERY DAY 90 Tab 1    dicyclomine (BENTYL) 10 mg capsule TAKE ONE CAPSULE BY MOUTH THREE TIMES A DAY AS NEEDED 90 Cap 0    DM/P-EPHED/ACETAMINOPH/DOXYLAM (NYQUIL PO) Take  by mouth.  fluticasone (FLONASE) 50 mcg/actuation nasal spray 2 Sprays by Both Nostrils route daily. 3 Bottle 3     No current facility-administered medications on file prior to visit. Allergies   Allergen Reactions    Other Medication Anaphylaxis     MRI Dye    Ciprofloxacin Anxiety    Epinephrine Palpitations    Ivp [Fd And C Blue No.1] Swelling     Facial swelling many years ago, now only has oral contrast    Macrobid [Nitrofurantoin Monohyd/M-Cryst] Other (comments)     Whole body starts shaking       Other Medication Palpitations    Prednisone Nausea and Vomiting       Objective:  Visit Vitals    /70 (BP 1 Location: Left arm, BP Patient Position: Sitting)    Pulse 78    Temp 98 °F (36.7 °C) (Oral)    Resp 17    Ht 5' 3\" (1.6 m)    Wt 163 lb (73.9 kg)    SpO2 95%    BMI 28.87 kg/m2      Constitutional: Well developed, nourished, no distress, alert   HENT: Exterior ears and tympanic membranes normal bilaterally. Supple neck. No thyromegaly or lymphadenopathy. Oropharynx clear and moist mucous membranes. Eyes: Conjunctiva normal. PERRL. facial ecchymoses. CV: S1, S2.  RRR. No murmurs/rubs. No thrills palpated. No carotid bruits. Intact distal pulses. No edema. Pulm: No abnormalities on inspection. Clear to auscultation bilaterally.  No wheezing/rhonchi. Normal effort. Neuro: A/O x 3. No focal motor or sensory deficits. Speech normal.   Psych: Appropriate affect, judgement and insight. Short-term memory intact. Skin: ecchymoses over R anterior distal leg, scab laceration on LLE.   Labwork and Ancillary Studies:    CBC w/Diff  Lab Results   Component Value Date/Time    WBC 7.5 01/13/2018 09:36 AM    HGB 12.7 01/13/2018 09:36 AM    PLATELET 510 26/83/4496 09:36 AM         Basic Metabolic Profile/LFTs  Lab Results   Component Value Date/Time    Sodium 141 01/13/2018 09:36 AM    Potassium 4.1 01/13/2018 09:36 AM    Chloride 107 01/13/2018 09:36 AM    CO2 27 01/13/2018 09:36 AM    Anion gap 7 01/13/2018 09:36 AM    Glucose 95 01/13/2018 09:36 AM    BUN 20 01/13/2018 09:36 AM    Creatinine 0.79 01/13/2018 09:36 AM    BUN/Creatinine ratio 25 01/13/2018 09:36 AM    GFR est AA >60 01/13/2018 09:36 AM    GFR est non-AA >60 01/13/2018 09:36 AM    Calcium 9.0 01/13/2018 09:36 AM

## 2018-01-18 NOTE — PROGRESS NOTES
Sonia Adkins is a 80 y.o. female (: 3/13/1931) presenting to address:    Chief Complaint   Patient presents with    Fall     ER        Vitals:    18 1248   BP: 126/70   Pulse: 78   Resp: 17   Temp: 98 °F (36.7 °C)   TempSrc: Oral   SpO2: 95%   Weight: 163 lb (73.9 kg)   Height: 5' 3\" (1.6 m)   PainSc:   2   PainLoc: Leg       Hearing/Vision:   No exam data present    Learning Assessment:     Learning Assessment 2015   PRIMARY LEARNER Patient   HIGHEST LEVEL OF EDUCATION - PRIMARY LEARNER  SOME COLLEGE   BARRIERS PRIMARY LEARNER HEARING   CO-LEARNER CAREGIVER No   PRIMARY LANGUAGE ENGLISH    NEED -   LEARNER PREFERENCE PRIMARY READING   LEARNING SPECIAL TOPICS -   ANSWERED BY self   RELATIONSHIP SELF     Depression Screening:     PHQ over the last two weeks 2017   PHQ Not Done Active Diagnosis of Depression or Bipolar Disorder     Fall Risk Assessment:     Fall Risk Assessment, last 12 mths 2017   Able to walk? Yes   Fall in past 12 months? Yes   Fall with injury? Yes   Number of falls in past 12 months 1   Fall Risk Score 2     Abuse Screening:     Abuse Screening Questionnaire 2017   Do you ever feel afraid of your partner? N   Are you in a relationship with someone who physically or mentally threatens you? N   Is it safe for you to go home? Y     Coordination of Care Questionaire:   1. Have you been to the ER, urgent care clinic since your last visit? Hospitalized since your last visit? Yes, [place] for fall at [facility] center   2. Have you seen or consulted any other health care providers outside of the [address] [place] [address] since your last visit? Include any pap smears or colon screening. No     Advanced Directive:   1. Do you have an Advanced Directive? Yes     2. Would you like information on Advanced Directives?  No   Health Maintenance Due   Topic Date Due    Pneumococcal 65+ Low/Medium Risk (2 of 2 - PPSV23) 2013    GLAUCOMA SCREENING Q2Y  2018

## 2018-01-18 NOTE — PATIENT INSTRUCTIONS
Facial Fracture: Care Instructions  Your Care Instructions  You have broken (fractured) one or more bones in your face. Swelling and bruising from the injury are likely to get worse over the first couple of days. After that, the swelling should steadily improve until it is gone. If you have bruises on your face, they may change as they heal. The skin may turn from black and blue to green to yellow or brown before it returns to its normal color. It may take several weeks for your injury to heal.  It is very important that you get follow-up care as directed so that the injury heals properly and does not lead to problems. The kind of care and treatment you will need depends on the specific type of break (or breaks) you have. You heal best when you take good care of yourself. Eat a variety of healthy foods, and don't smoke. Follow-up care is a key part of your treatment and safety. Be sure to make and go to all appointments, and call your doctor if you are having problems. It's also a good idea to know your test results and keep a list of the medicines you take. How can you care for yourself at home? · Put ice or a cold pack on your injury for 10 to 20 minutes at a time. Try to do this every 1 to 2 hours for the next 3 days (when you are awake) or until the swelling goes down. Put a thin cloth between the ice pack and your skin. · Go to all follow-up appointments with your doctor. Your doctor will determine whether you need further treatment, including surgery. · Take your medicines exactly as prescribed. Call your doctor if you think you are having a problem with your medicine. You will get more details on the specific medicines your doctor prescribes. · If your doctor prescribed antibiotics, take them exactly as directed. Do not stop taking them just because you feel better. You need to take the full course of antibiotics. · Be safe with medicines. Read and follow all instructions on the label.   ¨ If the doctor gave you a prescription medicine for pain, take it as prescribed. ¨ If you are not taking a prescription pain medicine, ask your doctor if you can take an over-the-counter medicine. · Keep your head elevated when you sleep. · Eat soft food to decrease jaw pain. · Do not blow your nose. Dab it with a tissue if you need to. When should you call for help? Call 911 anytime you think you may need emergency care. For example, call if:  ? · You have a seizure. ? · You passed out (lost consciousness). ? · You have tingling, weakness, or numbness on one side of your body. ?Call your doctor now or seek immediate medical care if:  ? · You have a severe headache. ? · You develop double vision. ? · You have a fever and stiff neck. ? · Clear, watery fluid drains from your nose. ? · You feel dizzy or lightheaded. ? · You have new eye pain or changes in your vision, such as blurring. ? · You have new ear pain, ringing in your ears, or trouble hearing. ? · You are confused, irritable, or not acting normally. ? · You have a hard time standing, walking, or talking. ? · You have new mouth or tooth pain, or you have trouble chewing. ? · You have increasing pain even after you have taken your pain medicine. ? Watch closely for changes in your health, and be sure to contact your doctor if:  ? · You develop a cough, cold, or sinus infection. ? · The symptoms from your injury are not steadily improving. Where can you learn more? Go to http://ward-vin.info/. Enter 0664 [id number] in the search box to learn more about \"Facial Fracture: Care Instructions. \"  Current as of: October 14, 2016  Content Version: 11.4  © 0303-7842 [employer]. Care instructions adapted under license by [employer] (which disclaims liability or warranty for this information).  If you have questions about a medical condition or this instruction, always ask your healthcare professional. [employer] disclaims any warranty or liability for your use of this information.

## 2018-01-18 NOTE — MR AVS SNAPSHOT
[facility] 
 
 
 [address] [facility] [id number] [phone] Patient: Peggy Escalante MRN: [id number] [id number] Visit Information Date & Time Provider Department Dept. Phone Encounter #  
 1/18/2018  2:00 PM Argelia Nguyen, 3 [facility] [phone] [id number] Your Appointments 1/18/2018  2:00 PM  
HOSPITAL FOLLOW-UP with Argelia Nguyen MD  
[facility]) Appt Note: [place]; PT R/S FROM 01/17/2018; r/s for sooner [address] [facility] [id number] [phone]  
  
   
 [address] Upcoming Health Maintenance Date Due Pneumococcal 65+ Low/Medium Risk (2 of 2 - PPSV23) 1/1/2013 GLAUCOMA SCREENING Q2Y 1/1/2018 MEDICARE YEARLY EXAM 11/22/2018 DTaP/Tdap/Td series (2 - Td) 9/1/2026 Allergies as of 1/18/2018  Review Complete On: 1/18/2018 By: Argelia Nguyen MD  
  
 Severity Noted Reaction Type Reactions Other Medication High 07/21/2014    Anaphylaxis MRI Dye  
 Ciprofloxacin  08/20/2017    Anxiety Epinephrine  11/19/2010    Palpitations Ivp [Fd And C Blue No.1]  11/19/2010    Swelling Facial swelling many years ago, now only has oral contrast  
 Macrobid [Nitrofurantoin Monohyd/m-cryst]  09/01/2016    Other (comments) Whole body starts shaking Other Medication  11/19/2010    Palpitations Prednisone  01/28/2011    Nausea and Vomiting Current Immunizations  Reviewed on 6/26/2014 Name Date Influenza High Dose Vaccine PF 9/1/2016 12:05 PM, 9/15/2015 Influenza Vaccine 9/20/2014 Influenza Vaccine Whole 9/1/2010 ZZZ-RETIRED (DO NOT USE) Pneumococcal Vaccine (Unspecified Type) 1/1/2008 Not reviewed this visit You Were Diagnosed With   
  
 Codes Comments Closed fracture of nasal bone, initial encounter    -  Primary ICD-10-CM: S02. 2XXA ICD-9-CM: 802.0 Fall, initial encounter     ICD-10-CM: W19. Kathryn Douglas ICD-9-CM: E888.9 Ecchymosis     ICD-10-CM: R58 
ICD-9-CM: 459.89 Vitals BP Pulse Temp Resp Height(growth percentile) Weight(growth percentile) 126/70 (BP 1 Location: Left arm, BP Patient Position: Sitting) 78 98 °F (36.7 °C) (Oral) 17 5' 3\" (1.6 m) 163 lb (73.9 kg) SpO2 BMI OB Status Smoking Status 95% 28.87 kg/m2 Postmenopausal Never Smoker Vitals History BMI and BSA Data Body Mass Index Body Surface Area  
 28.87 kg/m 2 1.81 m 2 Preferred Pharmacy Pharmacy Name Phone [facility] [place] [address] [phone] Your Updated Medication List  
  
   
This list is accurate as of: 1/18/18  1:03 PM.  Always use your most recent med list.  
  
  
  
  
 citalopram 40 mg tablet Commonly known as:  CELEXA  
TAKE ONE TABLET BY MOUTH EVERY DAY  
  
 dicyclomine 10 mg capsule Commonly known as:  BENTYL TAKE ONE CAPSULE BY MOUTH THREE TIMES A DAY AS NEEDED  
  
 fluticasone 50 mcg/actuation nasal spray Commonly known as:  Mimi Andrea 2 Sprays by Both Nostrils route daily. NYQUIL PO Take  by mouth. Patient Instructions Facial Fracture: Care Instructions Your Care Instructions You have broken (fractured) one or more bones in your face. Swelling and bruising from the injury are likely to get worse over the first couple of days. After that, the swelling should steadily improve until it is gone. If you have bruises on your face, they may change as they heal. The skin may turn from black and blue to green to yellow or brown before it returns to its normal color.  It may take several weeks for your injury to heal. 
It is very important that you get follow-up care as directed so that the injury heals properly and does not lead to problems. The kind of care and treatment you will need depends on the specific type of break (or breaks) you have. You heal best when you take good care of yourself. Eat a variety of healthy foods, and don't smoke. Follow-up care is a key part of your treatment and safety. Be sure to make and go to all appointments, and call your doctor if you are having problems. It's also a good idea to know your test results and keep a list of the medicines you take. How can you care for yourself at home? · Put ice or a cold pack on your injury for 10 to 20 minutes at a time. Try to do this every 1 to 2 hours for the next 3 days (when you are awake) or until the swelling goes down. Put a thin cloth between the ice pack and your skin. · Go to all follow-up appointments with your doctor. Your doctor will determine whether you need further treatment, including surgery. · Take your medicines exactly as prescribed. Call your doctor if you think you are having a problem with your medicine. You will get more details on the specific medicines your doctor prescribes. · If your doctor prescribed antibiotics, take them exactly as directed. Do not stop taking them just because you feel better. You need to take the full course of antibiotics. · Be safe with medicines. Read and follow all instructions on the label. ¨ If the doctor gave you a prescription medicine for pain, take it as prescribed. ¨ If you are not taking a prescription pain medicine, ask your doctor if you can take an over-the-counter medicine. · Keep your head elevated when you sleep. · Eat soft food to decrease jaw pain. · Do not blow your nose. Dab it with a tissue if you need to. When should you call for help? Call 911 anytime you think you may need emergency care. For example, call if: 
? · You have a seizure. ? · You passed out (lost consciousness). ? · You have tingling, weakness, or numbness on one side of your body. ?Call your doctor now or seek immediate medical care if: 
? · You have a severe headache. ? · You develop double vision. ? · You have a fever and stiff neck. ? · Clear, watery fluid drains from your nose. ? · You feel dizzy or lightheaded. ? · You have new eye pain or changes in your vision, such as blurring. ? · You have new ear pain, ringing in your ears, or trouble hearing. ? · You are confused, irritable, or not acting normally. ? · You have a hard time standing, walking, or talking. ? · You have new mouth or tooth pain, or you have trouble chewing. ? · You have increasing pain even after you have taken your pain medicine. ? Watch closely for changes in your health, and be sure to contact your doctor if: 
? · You develop a cough, cold, or sinus infection. ? · The symptoms from your injury are not steadily improving. Where can you learn more? Go to http://ward-vin.info/. Enter [id number] in the search box to learn more about \"Facial Fracture: Care Instructions. \" Current as of: October 14, 2016 Content Version: 11.4 © 7185-7256 [employer]. Care instructions adapted under license by [employer] (which disclaims liability or warranty for this information). If you have questions about a medical condition or this instruction, always ask your healthcare professional. [place] [address] any warranty or liability for your use of this information. Introducing [facility]! [facility] introduces [employer] patient portal. Now you can access parts of your medical record, email your doctor's office, and request medication refills online. 1. In your internet browser, go to [email or web address] 2. Click on the First Time User? Click Here link in the Sign In box. You will see the New Member Sign Up page. 3. Enter your Seed[employer]Spark Access Code exactly as it appears below. You will not need to use this code after youve completed the sign-up process. If you do not sign up before the expiration date, you must request a new code. · [employer]ark Access Code: [email or web address] Expires: 2/19/2018  9:37 AM 
 
4. Enter the last four digits of your Social Security Number (xxxx) and Date of Birth (mm/dd/yyyy) as indicated and click Submit. You will be taken to the next sign-up page. 5. Create a Seed&Spark ID. This will be your Seed[employer]Spark login ID and cannot be changed, so think of one that is secure and easy to remember. 6. Create a [employer] password. You can change your password at any time. 7. Enter your Password Reset Question and Answer. This can be used at a later time if you forget your password. 8. Enter your e-mail address. You will receive e-mail notification when new information is available in [address]. 9. Click Sign Up. You can now view and download portions of your medical record. 10. Click the Download Summary menu link to download a portable copy of your medical information. If you have questions, please visit the Frequently Asked Questions section of the [employer] website. Remember, Seed[employer]ark is NOT to be used for urgent needs. For medical emergencies, dial 911. Now available from your iPhone and Android! Please provide this summary of care documentation to your next provider. Your primary care clinician is listed as Gabino 13. If you have any questions after today's visit, please call [phone].

## 2018-03-05 ENCOUNTER — TELEPHONE (OUTPATIENT)
Dept: FAMILY MEDICINE CLINIC | Age: 83
End: 2018-03-05

## 2018-03-05 ENCOUNTER — OFFICE VISIT (OUTPATIENT)
Dept: FAMILY MEDICINE CLINIC | Age: 83
End: 2018-03-05

## 2018-03-05 VITALS
OXYGEN SATURATION: 97 % | HEART RATE: 75 BPM | SYSTOLIC BLOOD PRESSURE: 118 MMHG | WEIGHT: 160 LBS | TEMPERATURE: 97.6 F | HEIGHT: 63 IN | RESPIRATION RATE: 20 BRPM | DIASTOLIC BLOOD PRESSURE: 70 MMHG | BODY MASS INDEX: 28.35 KG/M2

## 2018-03-05 DIAGNOSIS — J40 BRONCHITIS: Primary | ICD-10-CM

## 2018-03-05 DIAGNOSIS — M70.41 PREPATELLAR BURSITIS OF RIGHT KNEE: ICD-10-CM

## 2018-03-05 DIAGNOSIS — M25.561 ACUTE PAIN OF RIGHT KNEE: ICD-10-CM

## 2018-03-05 DIAGNOSIS — F32.A DEPRESSION, UNSPECIFIED DEPRESSION TYPE: ICD-10-CM

## 2018-03-05 RX ORDER — MECLIZINE HYDROCHLORIDE 25 MG/1
25 TABLET ORAL
Qty: 30 TAB | Refills: 0 | Status: SHIPPED | OUTPATIENT
Start: 2018-03-05 | End: 2018-03-15

## 2018-03-05 RX ORDER — SERTRALINE HYDROCHLORIDE 25 MG/1
25 TABLET, FILM COATED ORAL DAILY
Qty: 30 TAB | Refills: 0 | Status: SHIPPED | OUTPATIENT
Start: 2018-03-05 | End: 2018-05-29 | Stop reason: SDUPTHER

## 2018-03-05 RX ORDER — AMOXICILLIN AND CLAVULANATE POTASSIUM 500; 125 MG/1; MG/1
1 TABLET, FILM COATED ORAL 2 TIMES DAILY
Qty: 14 TAB | Refills: 0 | Status: SHIPPED | OUTPATIENT
Start: 2018-03-05 | End: 2018-03-12

## 2018-03-05 NOTE — MR AVS SNAPSHOT
[address] [address] [facility] [address] [phone] Patient: Cooper Whitney MRN: [id number] MARILUZ:[id number] Visit Information Date & Time Provider Department Dept. Phone Encounter #  
 3/5/2018 10:30 AM Cheri Nash, [employer] [phone] [id number] Follow-up Instructions Return in about 1 month (around 4/5/2018). Follow-up and Disposition History Upcoming Health Maintenance Date Due Pneumococcal 65+ Low/Medium Risk (2 of 2 - PPSV23) 1/1/2013 GLAUCOMA SCREENING Q2Y 1/1/2018 MEDICARE YEARLY EXAM 11/22/2018 DTaP/Tdap/Td series (2 - Td) 9/1/2026 Allergies as of 3/5/2018  Review Complete On: 3/5/2018 By: Cheri Nash MD  
  
 Severity Noted Reaction Type Reactions Other Medication High 07/21/2014    Anaphylaxis MRI Dye  
 Ciprofloxacin  08/20/2017    Anxiety Epinephrine  11/19/2010    Palpitations Ivp [Fd And C Blue No.1]  11/19/2010    Swelling Facial swelling many years ago, now only has oral contrast  
 Macrobid [Nitrofurantoin Monohyd/m-cryst]  09/01/2016    Other (comments) Whole body starts shaking Other Medication  11/19/2010    Palpitations Prednisone  01/28/2011    Nausea and Vomiting Current Immunizations  Reviewed on 2/16/2018 Name Date Influenza High Dose Vaccine PF 9/1/2016 12:05 PM, 9/15/2015 Influenza Vaccine 11/9/2017, 9/20/2014 Influenza Vaccine PF 10/1/2016 12:00 AM  
 Influenza Vaccine Whole 9/1/2010 Pneumococcal Conjugate (PCV-13) 2/16/2018 ZZZ-RETIRED (DO NOT USE) Pneumococcal Vaccine (Unspecified Type) 1/1/2008 Not reviewed this visit You Were Diagnosed With   
  
 Codes Comments Bronchitis    -  Primary ICD-10-CM: F67 ICD-9-CM: 014 Acute pain of right knee     ICD-10-CM: M25.561 ICD-9-CM: 719.46 Prepatellar bursitis of right knee     ICD-10-CM: M70.41 ICD-9-CM: 726.65   
 Depression, unspecified depression type     ICD-10-CM: F32.9 ICD-9-CM: 502 Vitals BP Pulse Temp Resp Height(growth percentile) Weight(growth percentile) 118/70 (BP 1 Location: Right arm, BP Patient Position: Sitting) 75 97.6 °F (36.4 °C) (Oral) 20 5' 3\" (1.6 m) 160 lb (72.6 kg) SpO2 BMI OB Status Smoking Status 97% 28.34 kg/m2 Postmenopausal Never Smoker Vitals History BMI and BSA Data Body Mass Index Body Surface Area  
 28.34 kg/m 2 1.8 m 2 Preferred Pharmacy Pharmacy Name Phone [facility] [place] [address] [phone] Your Updated Medication List  
  
   
This list is accurate as of 3/5/18 10:58 AM.  Always use your most recent med list.  
  
  
  
  
 amoxicillin-clavulanate 500-125 mg per tablet Commonly known as:  AUGMENTIN Take 1 Tab by mouth two (2) times a day for 7 days. dicyclomine 10 mg capsule Commonly known as:  BENTYL TAKE ONE CAPSULE BY MOUTH THREE TIMES A DAY AS NEEDED  
  
 fluticasone 50 mcg/actuation nasal spray Commonly known as:  [place] Goody 2 Sprays by Both Nostrils route daily. NYQUIL PO Take  by mouth. OTHER Take 400 mg by mouth daily. otc chest congestion relief  
  
 sertraline 25 mg tablet Commonly known as:  ZOLOFT Take 1 Tab by mouth daily. Prescriptions Sent to Pharmacy Refills  
 amoxicillin-clavulanate (AUGMENTIN) 500-125 mg per tablet 0 Sig: Take 1 Tab by mouth two (2) times a day for 7 days. Class: Normal  
 Pharmacy: [facility] at [address] Ph #: [phone] Route: Oral  
 sertraline (ZOLOFT) 25 mg tablet 0 Sig: Take 1 Tab by mouth daily. Class: Normal  
 Pharmacy: [facility] at [address] Ph #: [phone] Route: Oral  
  
Follow-up Instructions Return in about 1 month (around 4/5/2018). Patient Instructions Bursitis: Care Instructions Your Care Instructions A bursa is a small sac of fluid that helps the tissues around a joint slide over one another easily. Injury or overuse of a joint can cause pain, redness, and inflammation in the bursa (bursitis). Bursitis usually gets better if you avoid the activity that caused it. You can help prevent bursitis from coming back by doing stretching and strengthening exercises. You may also need to change the way you do some activities. Follow-up care is a key part of your treatment and safety. Be sure to make and go to all appointments, and call your doctor if you are having problems. It's also a good idea to know your test results and keep a list of the medicines you take. How can you care for yourself at home? · Put ice or a cold pack on the area for 10 to 20 minutes at a time. Try to do this every 1 to 2 hours for the next 3 days (when you are awake) or until the swelling goes down. Put a thin cloth between the ice and your skin. · After the 3 days of using ice, you may use heat on the area. You can use a hot water bottle; a warm, moist towel; or a heating pad set on low. You can also try alternating heat and ice. · Rest the area where you have pain. Stop any activities that cause pain. Switch to activities that do not stress the area. · Take pain medicines exactly as directed. ¨ If the doctor gave you a prescription medicine for pain, take it as prescribed. ¨ If you are not taking a prescription pain medicine, ask your doctor if you can take an over-the-counter medicine. ¨ Do not take two or more pain medicines at the same time unless the doctor told you to. Many pain medicines have acetaminophen, which is Tylenol. Too much acetaminophen (Tylenol) can be harmful. · To prevent stiffness, gently move the joint as much as you can without pain every day.  As the pain gets better, keep doing range-of-motion exercises. Ask your doctor for exercises that will make the muscles around the joint stronger. Do these as directed. · You can slowly return to the activity that caused the pain, but do it with less effort until you can do it without pain or swelling. Be sure to warm up before and stretch after you do the activity. When should you call for help? Call your doctor now or seek immediate medical care if: 
? · You have new or worse symptoms of infection, such as: 
¨ Increased pain, swelling, warmth, or redness. ¨ Red streaks leading from the area. ¨ Pus draining from the area. ¨ A fever. ? Watch closely for changes in your health, and be sure to contact your doctor if: 
? · You do not get better as expected. Where can you learn more? Go to http://ward-vin.info/. Enter U891 in the search box to learn more about \"Bursitis: Care Instructions. \" Current as of: March 21, 2017 Content Version: 11.4 © 7249-7964 Dr. TATTOFF. Care instructions adapted under license by [employer] (which disclaims liability or warranty for this information). If you have questions about a medical condition or this instruction, always ask your healthcare professional. [place] [address] any warranty or liability for your use of this information. Introducing [facility]! Dear Ca Oliva: 
Thank you for requesting a Trusper account. Our records indicate that you already have an active Trusper account. You can access your account anytime at [email or web address] Did you know that you can access your hospital and ER discharge instructions at any time in Trusper? You can also review all of your test results from your hospital stay or ER visit. Additional Information If you have questions, please visit the Frequently Asked Questions section of the Trusper website at [email or web address] Remember, MyChart is NOT to be used for urgent needs. For medical emergencies, dial 911. Now available from your iPhone and Android! Please provide this summary of care documentation to your next provider. Your primary care clinician is listed as Gabino Barnett. If you have any questions after today's visit, please call [phone].

## 2018-03-05 NOTE — PROGRESS NOTES
Melinda Fox is a 80 y.o. female (: 3/13/1931) presenting to address:    Chief Complaint   Patient presents with    Cough     With green phlegm    Hoarse       Vitals:    18 1036   BP: 118/70   Pulse: 75   Resp: 20   Temp: 97.6 °F (36.4 °C)   TempSrc: Oral   SpO2: 97%   Weight: 160 lb (72.6 kg)   Height: 5' 3\" (1.6 m)   PainSc:   1   PainLoc: Knee     Learning Assessment:     Learning Assessment 2015   PRIMARY LEARNER Patient   HIGHEST LEVEL OF EDUCATION - PRIMARY LEARNER  SOME COLLEGE   BARRIERS PRIMARY LEARNER HEARING   CO-LEARNER CAREGIVER No   PRIMARY LANGUAGE ENGLISH    NEED -   LEARNER PREFERENCE PRIMARY READING   LEARNING SPECIAL TOPICS -   ANSWERED BY self   RELATIONSHIP SELF     Depression Screening:     PHQ over the last two weeks 2017   PHQ Not Done Active Diagnosis of Depression or Bipolar Disorder     Fall Risk Assessment:     Fall Risk Assessment, last 12 mths 2018   Able to walk? Yes   Fall in past 12 months? Yes   Fall with injury? Yes   Number of falls in past 12 months 2   Fall Risk Score 3     Abuse Screening:     Abuse Screening Questionnaire 2017   Do you ever feel afraid of your partner? N   Are you in a relationship with someone who physically or mentally threatens you? N   Is it safe for you to go home? Y     Coordination of Care Questionaire:   1. Have you been to the ER, urgent care clinic since your last visit? Hospitalized since your last visit? NO    2. Have you seen or consulted any other health care providers outside of the [address] [place] [address] since your last visit? Include any pap smears or colon screening. NO    Advanced Directive:   1. Do you have an Advanced Directive? YES    2. Would you like information on Advanced Directives?  NO

## 2018-03-05 NOTE — TELEPHONE ENCOUNTER
Jori Ayon contacted the after hours service on Saturday 3/3/18 at 0837 to discuss bothersome nighttime cough and malaise without fevers, difficulty breathing, muscle aches or GI upset. Discussed OTC medication use and recs, rest and alarm symptoms. She called again on Reggie 3/4/18 at 0909, no change in symptoms, no alarm symptoms, advised to continue conservative measures, go to urgent care if she feels she cannot wait to be scheduled with PCP. Catherine Dean

## 2018-03-05 NOTE — PATIENT INSTRUCTIONS
Bursitis: Care Instructions  Your Care Instructions    A bursa is a small sac of fluid that helps the tissues around a joint slide over one another easily. Injury or overuse of a joint can cause pain, redness, and inflammation in the bursa (bursitis). Bursitis usually gets better if you avoid the activity that caused it. You can help prevent bursitis from coming back by doing stretching and strengthening exercises. You may also need to change the way you do some activities. Follow-up care is a key part of your treatment and safety. Be sure to make and go to all appointments, and call your doctor if you are having problems. It's also a good idea to know your test results and keep a list of the medicines you take. How can you care for yourself at home? · Put ice or a cold pack on the area for 10 to 20 minutes at a time. Try to do this every 1 to 2 hours for the next 3 days (when you are awake) or until the swelling goes down. Put a thin cloth between the ice and your skin. · After the 3 days of using ice, you may use heat on the area. You can use a hot water bottle; a warm, moist towel; or a heating pad set on low. You can also try alternating heat and ice. · Rest the area where you have pain. Stop any activities that cause pain. Switch to activities that do not stress the area. · Take pain medicines exactly as directed. ¨ If the doctor gave you a prescription medicine for pain, take it as prescribed. ¨ If you are not taking a prescription pain medicine, ask your doctor if you can take an over-the-counter medicine. ¨ Do not take two or more pain medicines at the same time unless the doctor told you to. Many pain medicines have acetaminophen, which is Tylenol. Too much acetaminophen (Tylenol) can be harmful. · To prevent stiffness, gently move the joint as much as you can without pain every day. As the pain gets better, keep doing range-of-motion exercises.  Ask your doctor for exercises that will make the muscles around the joint stronger. Do these as directed. · You can slowly return to the activity that caused the pain, but do it with less effort until you can do it without pain or swelling. Be sure to warm up before and stretch after you do the activity. When should you call for help? Call your doctor now or seek immediate medical care if:  ? · You have new or worse symptoms of infection, such as:  ¨ Increased pain, swelling, warmth, or redness. ¨ Red streaks leading from the area. ¨ Pus draining from the area. ¨ A fever. ? Watch closely for changes in your health, and be sure to contact your doctor if:  ? · You do not get better as expected. Where can you learn more? Go to http://ward-vin.info/. Enter M823 in the search box to learn more about \"Bursitis: Care Instructions. \"  Current as of: March 21, 2017  Content Version: 11.4  © 5449-8112 [employer]. Care instructions adapted under license by [employer] (which disclaims liability or warranty for this information). If you have questions about a medical condition or this instruction, always ask your healthcare professional. [place] [address] any warranty or liability for your use of this information.
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
## 2018-04-23 ENCOUNTER — TELEPHONE (OUTPATIENT)
Dept: FAMILY MEDICINE CLINIC | Age: 83
End: 2018-04-23

## 2018-04-23 NOTE — TELEPHONE ENCOUNTER
Patient was notified that there was no need for her appointment today. Patient would like to be referred out to a Ortho. Please call the patient once the referral information has been sent to their office.

## 2018-04-25 ENCOUNTER — APPOINTMENT (OUTPATIENT)
Dept: CT IMAGING | Age: 83
End: 2018-04-25
Attending: EMERGENCY MEDICINE
Payer: MEDICARE

## 2018-04-25 ENCOUNTER — HOSPITAL ENCOUNTER (EMERGENCY)
Age: 83
Discharge: HOME | End: 2018-04-25
Attending: EMERGENCY MEDICINE
Payer: MEDICARE

## 2018-04-25 VITALS
HEART RATE: 60 BPM | RESPIRATION RATE: 16 BRPM | SYSTOLIC BLOOD PRESSURE: 133 MMHG | OXYGEN SATURATION: 98 % | DIASTOLIC BLOOD PRESSURE: 64 MMHG | TEMPERATURE: 98.2 F

## 2018-04-25 DIAGNOSIS — R10.32 ABDOMINAL PAIN, LLQ (LEFT LOWER QUADRANT): Primary | ICD-10-CM

## 2018-04-25 DIAGNOSIS — N94.89: ICD-10-CM

## 2018-04-25 LAB
ANION GAP SERPL CALC-SCNC: 4 MMOL/L (ref 3–18)
APPEARANCE UR: CLEAR
BACTERIA URNS QL MICRO: NEGATIVE /HPF
BASOPHILS # BLD: 0 K/UL (ref 0–0.06)
BASOPHILS NFR BLD: 1 % (ref 0–2)
BILIRUB UR QL: NEGATIVE
BUN SERPL-MCNC: 20 MG/DL (ref 7–18)
BUN/CREAT SERPL: 23 (ref 12–20)
CALCIUM SERPL-MCNC: 8.8 MG/DL (ref 8.5–10.1)
CHLORIDE SERPL-SCNC: 109 MMOL/L (ref 100–108)
CO2 SERPL-SCNC: 29 MMOL/L (ref 21–32)
COLOR UR: YELLOW
CREAT SERPL-MCNC: 0.86 MG/DL (ref 0.6–1.3)
DIFFERENTIAL METHOD BLD: NORMAL
EOSINOPHIL # BLD: 0.1 K/UL (ref 0–0.4)
EOSINOPHIL NFR BLD: 2 % (ref 0–5)
EPITH CASTS URNS QL MICRO: NORMAL /LPF (ref 0–5)
ERYTHROCYTE [DISTWIDTH] IN BLOOD BY AUTOMATED COUNT: 13.3 % (ref 11.6–14.5)
GLUCOSE SERPL-MCNC: 91 MG/DL (ref 74–99)
GLUCOSE UR STRIP.AUTO-MCNC: NEGATIVE MG/DL
HCT VFR BLD AUTO: 40.8 % (ref 35–45)
HGB BLD-MCNC: 13.4 G/DL (ref 12–16)
HGB UR QL STRIP: NEGATIVE
KETONES UR QL STRIP.AUTO: NEGATIVE MG/DL
LEUKOCYTE ESTERASE UR QL STRIP.AUTO: ABNORMAL
LIPASE SERPL-CCNC: 133 U/L (ref 73–393)
LYMPHOCYTES # BLD: 1.9 K/UL (ref 0.9–3.6)
LYMPHOCYTES NFR BLD: 31 % (ref 21–52)
MCH RBC QN AUTO: 31.3 PG (ref 24–34)
MCHC RBC AUTO-ENTMCNC: 32.8 G/DL (ref 31–37)
MCV RBC AUTO: 95.3 FL (ref 74–97)
MONOCYTES # BLD: 0.5 K/UL (ref 0.05–1.2)
MONOCYTES NFR BLD: 8 % (ref 3–10)
NEUTS SEG # BLD: 3.7 K/UL (ref 1.8–8)
NEUTS SEG NFR BLD: 58 % (ref 40–73)
NITRITE UR QL STRIP.AUTO: NEGATIVE
PH UR STRIP: 7 [PH] (ref 5–8)
PLATELET # BLD AUTO: 199 K/UL (ref 135–420)
PMV BLD AUTO: 11.8 FL (ref 9.2–11.8)
POTASSIUM SERPL-SCNC: 4.1 MMOL/L (ref 3.5–5.5)
PROT UR STRIP-MCNC: NEGATIVE MG/DL
RBC # BLD AUTO: 4.28 M/UL (ref 4.2–5.3)
RBC #/AREA URNS HPF: 0 /HPF (ref 0–5)
SODIUM SERPL-SCNC: 142 MMOL/L (ref 136–145)
SP GR UR REFRACTOMETRY: 1.02 (ref 1–1.03)
UROBILINOGEN UR QL STRIP.AUTO: 0.2 EU/DL (ref 0.2–1)
WBC # BLD AUTO: 6.2 K/UL (ref 4.6–13.2)
WBC URNS QL MICRO: NORMAL /HPF (ref 0–4)

## 2018-04-25 PROCEDURE — 85025 COMPLETE CBC W/AUTO DIFF WBC: CPT | Performed by: EMERGENCY MEDICINE

## 2018-04-25 PROCEDURE — 81001 URINALYSIS AUTO W/SCOPE: CPT | Performed by: EMERGENCY MEDICINE

## 2018-04-25 PROCEDURE — 83690 ASSAY OF LIPASE: CPT | Performed by: EMERGENCY MEDICINE

## 2018-04-25 PROCEDURE — 74176 CT ABD & PELVIS W/O CONTRAST: CPT

## 2018-04-25 PROCEDURE — 99283 EMERGENCY DEPT VISIT LOW MDM: CPT

## 2018-04-25 PROCEDURE — 80048 BASIC METABOLIC PNL TOTAL CA: CPT | Performed by: EMERGENCY MEDICINE

## 2018-04-25 NOTE — ED TRIAGE NOTES
\"I usually have pain in my stomach on the right from IBS, but today I'm having pain in my left side that started in the bottom middle.  \"

## 2018-04-25 NOTE — ED PROVIDER NOTES
EMERGENCY DEPARTMENT HISTORY AND PHYSICAL EXAM    9:21 AM      Date: 4/25/2018  Patient Name: Osmar Ayon    History of Presenting Illness     Chief Complaint   Patient presents with    Abdominal Pain         History Provided By: Patient    Chief Complaint: Abd pain   Duration: 1 Days  Timing:  Acute  Location: LLQ, middle abdomen  Quality: N/A  Severity: 5 out of 10  Modifying Factors: none reported  Associated Symptoms: denies any other associated signs or symptoms      Additional History (Context): Lelo Gilliam is a 80 y.o. female with CVA, HLD, CA, IBS, diverticula who presents with middle abd pain radiating to LLQ x1 day. Pt states she has IBS but this pain is different than that. Denies vomit, diarrhea. Nml BM PTA. No abd surgeries, no recent colonoscopies. Pt not on blood thinners, took Ibuprofen for pain. PCP: Evert Navarro MD    Current Facility-Administered Medications   Medication Dose Route Frequency Provider Last Rate Last Dose    sodium chloride 0.9 % bolus infusion 500 mL  500 mL IntraVENous ONCE Shoshana Koyanagi, MD         Current Outpatient Prescriptions   Medication Sig Dispense Refill    OTHER Take 400 mg by mouth daily. otc chest congestion relief      sertraline (ZOLOFT) 25 mg tablet Take 1 Tab by mouth daily. 30 Tab 0    dicyclomine (BENTYL) 10 mg capsule TAKE ONE CAPSULE BY MOUTH THREE TIMES A DAY AS NEEDED 90 Cap 0    DM/P-EPHED/ACETAMINOPH/DOXYLAM (NYQUIL PO) Take  by mouth.  fluticasone (FLONASE) 50 mcg/actuation nasal spray 2 Sprays by Both Nostrils route daily.  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    Diverticula of colon     Gastrointestinal disorder     High cholesterol     IBS (irritable bowel syndrome)     Kidney stone     Other ill-defined conditions(709.89)     heart murmur    Psychiatric disorder     anxiety    Skin cancer     Stroke [facility])        Past Surgical History:  Past Surgical History:   Procedure Laterality Date    HX GYN  1970s    bladder tack in [facility]     HX OTHER SURGICAL  1980    cystocele    HX UROLOGICAL      bladder tac       Family History:  Family History   Problem Relation Age of Onset    Heart Attack Father     Heart Attack Brother        Social History:  Social History   Substance Use Topics    Smoking status: Never Smoker    Smokeless tobacco: Never Used    Alcohol use No       Allergies: Allergies   Allergen Reactions    Other Medication Anaphylaxis     MRI Dye    Ciprofloxacin Anxiety    Epinephrine Palpitations    Ivp [Fd And C Blue No.1] Swelling     Facial swelling many years ago, now only has oral contrast    Macrobid [Nitrofurantoin Monohyd/M-Cryst] Other (comments)     Whole body starts shaking       Other Medication Palpitations    Prednisone Nausea and Vomiting         Review of Systems       Review of Systems   Constitutional: Negative for chills and fever. Gastrointestinal: Positive for abdominal pain (mid, LLQ). Negative for constipation, diarrhea and vomiting. All other systems reviewed and are negative. Physical Exam     Visit Vitals    BP (!) 152/98    Pulse 68    Temp 98.2 °F (36.8 °C)    Resp 16    SpO2 99%         Physical Exam   Constitutional: She is oriented to person, place, and time. She appears well-developed and well-nourished. No distress. HENT:   Head: Normocephalic and atraumatic. Mouth/Throat: Oropharynx is clear and moist.   Eyes: Conjunctivae and EOM are normal. Pupils are equal, round, and reactive to light. No scleral icterus. Neck: Normal range of motion. Neck supple. Cardiovascular: Normal rate, regular rhythm and normal heart sounds. No murmur heard. Pulmonary/Chest: Effort normal and breath sounds normal. No respiratory distress. Abdominal: Soft. Bowel sounds are normal. She exhibits no distension.  There is tenderness in the left lower quadrant. There is no rebound, no guarding and no CVA tenderness. Musculoskeletal: She exhibits no edema. Lymphadenopathy:     She has no cervical adenopathy. Neurological: She is alert and oriented to person, place, and time. Coordination normal.   Skin: Skin is warm and dry. No rash noted. Psychiatric: She has a normal mood and affect. Her behavior is normal.   Nursing note and vitals reviewed. Diagnostic Study Results     Labs -  Recent Results (from the past 12 hour(s))   URINALYSIS W/ RFLX MICROSCOPIC    Collection Time: 04/25/18  9:29 AM   Result Value Ref Range    Color YELLOW      Appearance CLEAR      Specific gravity 1.023 1.005 - 1.030      pH (UA) 7.0 5.0 - 8.0      Protein NEGATIVE  NEG mg/dL    Glucose NEGATIVE  NEG mg/dL    Ketone NEGATIVE  NEG mg/dL    Bilirubin NEGATIVE  NEG      Blood NEGATIVE  NEG      Urobilinogen 0.2 0.2 - 1.0 EU/dL    Nitrites NEGATIVE  NEG      Leukocyte Esterase TRACE (A) NEG     URINE MICROSCOPIC ONLY    Collection Time: 04/25/18  9:29 AM   Result Value Ref Range    WBC 0 to 1 0 - 4 /hpf    RBC 0 0 - 5 /hpf    Epithelial cells 2+ 0 - 5 /lpf    Bacteria NEGATIVE  NEG /hpf   CBC WITH AUTOMATED DIFF    Collection Time: 04/25/18  9:48 AM   Result Value Ref Range    WBC 6.2 4.6 - 13.2 K/uL    RBC 4.28 4.20 - 5.30 M/uL    HGB 13.4 12.0 - 16.0 g/dL    HCT 40.8 35.0 - 45.0 %    MCV 95.3 74.0 - 97.0 FL    MCH 31.3 24.0 - 34.0 PG    MCHC 32.8 31.0 - 37.0 g/dL    RDW 13.3 11.6 - 14.5 %    PLATELET 506 894 - 889 K/uL    MPV 11.8 9.2 - 11.8 FL    NEUTROPHILS 58 40 - 73 %    LYMPHOCYTES 31 21 - 52 %    MONOCYTES 8 3 - 10 %    EOSINOPHILS 2 0 - 5 %    BASOPHILS 1 0 - 2 %    ABS. NEUTROPHILS 3.7 1.8 - 8.0 K/UL    ABS. LYMPHOCYTES 1.9 0.9 - 3.6 K/UL    ABS. MONOCYTES 0.5 0.05 - 1.2 K/UL    ABS. EOSINOPHILS 0.1 0.0 - 0.4 K/UL    ABS.  BASOPHILS 0.0 0.0 - 0.06 K/UL    DF AUTOMATED     METABOLIC PANEL, BASIC    Collection Time: 04/25/18  9:48 AM   Result Value Ref Range Sodium 142 136 - 145 mmol/L    Potassium 4.1 3.5 - 5.5 mmol/L    Chloride 109 (H) 100 - 108 mmol/L    CO2 29 21 - 32 mmol/L    Anion gap 4 3.0 - 18 mmol/L    Glucose 91 74 - 99 mg/dL    BUN 20 (H) 7.0 - 18 MG/DL    Creatinine 0.86 0.6 - 1.3 MG/DL    BUN/Creatinine ratio 23 (H) 12 - 20      GFR est AA >60 >60 ml/min/1.73m2    GFR est non-AA >60 >60 ml/min/1.73m2    Calcium 8.8 8.5 - 10.1 MG/DL   LIPASE    Collection Time: 04/25/18  9:48 AM   Result Value Ref Range    Lipase 133 73 - 393 U/L       Radiologic Studies -   CT ABD PELV WO CONT   Final Result            Medical Decision Making   I am the first provider for this patient. I reviewed the vital signs, available nursing notes, past medical history, past surgical history, family history and social history. Vital Signs-Reviewed the patient's vital signs. Pulse Oximetry Analysis -  99% on room air (Interpretation) nonhypoxic    Cardiac Monitor:  Rate: 68        Records Reviewed: Nursing Notes (Time of Review: 9:21 AM)    ED Course: Progress Notes, Reevaluation, and Consults:        Provider Notes (Medical Decision Making):   MDM  Number of Diagnoses or Management Options  Abdominal pain, LLQ (left lower quadrant):   Hydrocele, canal of Nuck:   Diagnosis management comments: H/o diverticulitis in past with L lower quad pain nontoxic appearing    12:52 PM   re eval pain resolved ct and labs reviewed will dc home        Amount and/or Complexity of Data Reviewed  Clinical lab tests: ordered and reviewed                  Diagnosis     Clinical Impression:   1. Abdominal pain, LLQ (left lower quadrant)    2.  Hydrocele, canal of Nuck        Disposition: home     Follow-up Information     Follow up With Details Comments Contact Info    [facility] EMERGENCY DEPT  As needed, If symptoms worsen [address]  [address] ([facility]) [address]    Sergey Harper MD Schedule an appointment as soon as possible for a visit for ED follow up appointment  Freddy Daria   [phone]             Patient's Medications   Start Taking    No medications on file   Continue Taking    DICYCLOMINE (BENTYL) 10 MG CAPSULE    TAKE ONE CAPSULE BY MOUTH THREE TIMES A DAY AS NEEDED    DM/P-EPHED/ACETAMINOPH/DOXYLAM (NYQUIL PO)    Take  by mouth. FLUTICASONE (FLONASE) 50 MCG/ACTUATION NASAL SPRAY    2 Sprays by Both Nostrils route daily. OTHER    Take 400 mg by mouth daily. otc chest congestion relief    SERTRALINE (ZOLOFT) 25 MG TABLET    Take 1 Tab by mouth daily. These Medications have changed    No medications on file   Stop Taking    No medications on file     _______________________________    Attestations:  [address] [place] [address] acting as a scribe for and in the presence of Andrews Rosen MD      April 25, 2018 at 12:52 PM       Provider Attestation:      I personally performed the services described in the documentation, reviewed the documentation, as recorded by the scribe in my presence, and it accurately and completely records my words and actions.  April 25, 2018 at 12:52 PM - Andrews Rosen MD    _______________________________

## 2018-04-25 NOTE — DISCHARGE INSTRUCTIONS
Abdominal Pain: Care Instructions  Your Care Instructions    Abdominal pain has many possible causes. Some aren't serious and get better on their own in a few days. Others need more testing and treatment. If your pain continues or gets worse, you need to be rechecked and may need more tests to find out what is wrong. You may need surgery to correct the problem. Don't ignore new symptoms, such as fever, nausea and vomiting, urination problems, pain that gets worse, and dizziness. These may be signs of a more serious problem. Your doctor may have recommended a follow-up visit in the next 8 to 12 hours. If you are not getting better, you may need more tests or treatment. The doctor has checked you carefully, but problems can develop later. If you notice any problems or new symptoms, get medical treatment right away. Follow-up care is a key part of your treatment and safety. Be sure to make and go to all appointments, and call your doctor if you are having problems. It's also a good idea to know your test results and keep a list of the medicines you take. How can you care for yourself at home? · Rest until you feel better. · To prevent dehydration, drink plenty of fluids, enough so that your urine is light yellow or clear like water. Choose water and other caffeine-free clear liquids until you feel better. If you have kidney, heart, or liver disease and have to limit fluids, talk with your doctor before you increase the amount of fluids you drink. · If your stomach is upset, eat mild foods, such as rice, dry toast or crackers, bananas, and applesauce. Try eating several small meals instead of two or three large ones. · Wait until 48 hours after all symptoms have gone away before you have spicy foods, alcohol, and drinks that contain caffeine. · Do not eat foods that are high in fat. · Avoid anti-inflammatory medicines such as aspirin, ibuprofen (Advil, Motrin), and naproxen (Aleve).  These can cause stomach upset. Talk to your doctor if you take daily aspirin for another health problem. When should you call for help? Call 911 anytime you think you may need emergency care. For example, call if:  ? · You passed out (lost consciousness). ? · You pass maroon or very bloody stools. ? · You vomit blood or what looks like coffee grounds. ? · You have new, severe belly pain. ?Call your doctor now or seek immediate medical care if:  ? · Your pain gets worse, especially if it becomes focused in one area of your belly. ? · You have a new or higher fever. ? · Your stools are black and look like tar, or they have streaks of blood. ? · You have unexpected vaginal bleeding. ? · You have symptoms of a urinary tract infection. These may include:  ¨ Pain when you urinate. ¨ Urinating more often than usual.  ¨ Blood in your urine. ? · You are dizzy or lightheaded, or you feel like you may faint. ? Watch closely for changes in your health, and be sure to contact your doctor if:  ? · You are not getting better after 1 day (24 hours). Where can you learn more? Go to http://ward-vin.info/. Enter Y492 in the search box to learn more about \"Abdominal Pain: Care Instructions. \"  Current as of: March 20, 2017  Content Version: 11.4  © 1880-4795 [employer]. Care instructions adapted under license by [employer] (which disclaims liability or warranty for this information). If you have questions about a medical condition or this instruction, always ask your healthcare professional. [place] [address] any warranty or liability for your use of this information.

## 2018-05-04 ENCOUNTER — TELEPHONE (OUTPATIENT)
Dept: FAMILY MEDICINE CLINIC | Age: 83
End: 2018-05-04

## 2018-05-04 NOTE — TELEPHONE ENCOUNTER
Py called because a collection agency called her stating that she owed $1,000 dollars from a DOS of 11/27/16 from [facility]orus. Pt called Sergey about the  issue and they told her that they were waiting on some approval/authorization from Dr Merced Henry. Pt is very confused and is looking for some clarification for this. Please advise.      Human [phone]

## 2018-05-29 ENCOUNTER — OFFICE VISIT (OUTPATIENT)
Dept: FAMILY MEDICINE CLINIC | Age: 83
End: 2018-05-29

## 2018-05-29 VITALS
WEIGHT: 159 LBS | OXYGEN SATURATION: 97 % | RESPIRATION RATE: 20 BRPM | HEART RATE: 79 BPM | HEIGHT: 63 IN | TEMPERATURE: 97.9 F | DIASTOLIC BLOOD PRESSURE: 70 MMHG | BODY MASS INDEX: 28.17 KG/M2 | SYSTOLIC BLOOD PRESSURE: 138 MMHG

## 2018-05-29 DIAGNOSIS — G89.29 CHRONIC PAIN OF BOTH SHOULDERS: ICD-10-CM

## 2018-05-29 DIAGNOSIS — M25.511 CHRONIC PAIN OF BOTH SHOULDERS: ICD-10-CM

## 2018-05-29 DIAGNOSIS — M25.512 CHRONIC PAIN OF BOTH SHOULDERS: ICD-10-CM

## 2018-05-29 DIAGNOSIS — F32.A DEPRESSION, UNSPECIFIED DEPRESSION TYPE: Primary | ICD-10-CM

## 2018-05-29 RX ORDER — FLUTICASONE PROPIONATE 50 MCG
2 SPRAY, SUSPENSION (ML) NASAL DAILY
COMMUNITY

## 2018-05-29 RX ORDER — DEXTROMETHORPHAN HYDROBROMIDE, GUAIFENESIN 5; 100 MG/5ML; MG/5ML
650 LIQUID ORAL EVERY 8 HOURS
COMMUNITY
End: 2019-07-26 | Stop reason: DRUGHIGH

## 2018-05-29 RX ORDER — SIMETHICONE 125 MG
125 CAPSULE ORAL
COMMUNITY

## 2018-05-29 RX ORDER — SERTRALINE HYDROCHLORIDE 50 MG/1
50 TABLET, FILM COATED ORAL DAILY
Qty: 90 TAB | Refills: 1 | Status: SHIPPED | OUTPATIENT
Start: 2018-05-29 | End: 2018-06-29

## 2018-05-29 NOTE — PATIENT INSTRUCTIONS
Frozen Shoulder: Exercises  Your Care Instructions  Here are some examples of typical rehabilitation exercises for your condition. Start each exercise slowly. Ease off the exercise if you start to have pain. Your doctor or physical therapist will tell you when you can start these exercises and which ones will work best for you. How to do the exercises  Neck stretches    1. Look straight ahead, and tip your right ear to your right shoulder. Do not let your left shoulder rise up as you tip your head to the right. 2. Hold 15 to 30 seconds. 3. Tilt your head to the left. Do not let your right shoulder rise up as you tip your head to the left. 4. Hold for 15 to 30 seconds. 5. Repeat 2 to 4 times to each side. Shoulder rolls    1. Sit comfortably with your feet shoulder-width apart. You can also do this exercise while standing. 2. Roll your shoulders up, then back, and then down in a smooth, circular motion. 3. Repeat 2 to 4 times. Shoulder flexion (lying down)    For this exercise, you will need a wand. To make a wand, use a piece of PVC pipe or a broom handle with the broom removed. Make the wand about a foot wider than your shoulders. 1. Lie on your back, holding a wand with your hands. Your palms should face down as you hold the wand. Place your hands slightly wider than your shoulders. 2. Keeping your elbows straight, slowly raise your arms over your head until you feel a stretch in your shoulders, upper back, and chest.  3. Hold 15 to 30 seconds. 4. Repeat 2 to 4 times. Shoulder rotation (lying down)    To make a wand, use a piece of PVC pipe or a broom handle with the broom removed. Make the wand about a foot wider than your shoulders. 1. Lie on your back and hold a wand in both hands with your elbows bent and your palms up. 2. Keeping your elbows close to your body, move the wand across your body toward the arm that has pain. 3. Hold for 15 to 30 seconds. 4. Repeat 2 to 4 times.   Shoulder internal rotation with towel    1. Roll up a towel lengthwise. Hold the towel above and behind your head with the arm that is not sore. 2. With your sore arm, reach behind your back and grasp the towel. 3. Using the arm above your head, pull the towel upward until you feel a stretch on the front and outside of your sore shoulder. 4. Hold 15 to 30 seconds. 5. Relax and move the towel back down to the starting position. 6. Repeat 2 to 4 times. Shoulder blade squeeze    1. While standing with your arms at your sides, squeeze your shoulder blades together. Do not raise your shoulders up as you are squeezing. 2. Hold for 6 seconds. 3. Repeat 8 to 12 times. Follow-up care is a key part of your treatment and safety. Be sure to make and go to all appointments, and call your doctor if you are having problems. It's also a good idea to know your test results and keep a list of the medicines you take. Where can you learn more? Go to http://ward-vin.info/. Enter R144 in the search box to learn more about \"Frozen Shoulder: Exercises. \"  Current as of: March 21, 2017  Content Version: 11.4  © 2366-1443 [employer]. Care instructions adapted under license by [employer] (which disclaims liability or warranty for this information). If you have questions about a medical condition or this instruction, always ask your healthcare professional. [place] [address] any warranty or liability for your use of this information.
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
## 2018-05-29 NOTE — MR AVS SNAPSHOT
[address]  [address] 0003 [facility] [address] [phone] Patient: Carolyn Carter MRN: [id number] SBF:[id number] Visit Information Date & Time Provider Department Dept. Phone Encounter #  
 5/29/2018  1:15 PM Valerie Coelho, 3 [facility] [id number] Upcoming Health Maintenance Date Due  
 GLAUCOMA SCREENING Q2Y 1/1/2018 Influenza Age 5 to Adult 8/1/2018 MEDICARE YEARLY EXAM 11/22/2018 DTaP/Tdap/Td series (2 - Td) 9/1/2026 Allergies as of 5/29/2018  Review Complete On: 5/29/2018 By: Valerie Coelho MD  
  
 Severity Noted Reaction Type Reactions Other Medication High 07/21/2014    Anaphylaxis MRI Dye  
 Ciprofloxacin  08/20/2017    Anxiety Epinephrine  11/19/2010    Palpitations Ivp [Fd And C Blue No.1]  11/19/2010    Swelling Facial swelling many years ago, now only has oral contrast  
 Macrobid [Nitrofurantoin Monohyd/m-cryst]  09/01/2016    Other (comments) Whole body starts shaking Other Medication  11/19/2010    Palpitations Prednisone  01/28/2011    Nausea and Vomiting Current Immunizations  Reviewed on 2/16/2018 Name Date Influenza High Dose Vaccine PF 9/1/2016 12:05 PM, 9/15/2015 Influenza Vaccine 11/9/2017, 9/20/2014 Influenza Vaccine PF 10/1/2016 12:00 AM  
 Influenza Vaccine Whole 9/1/2010 Pneumococcal Conjugate (PCV-13) 2/16/2018 ZZZ-RETIRED (DO NOT USE) Pneumococcal Vaccine (Unspecified Type) 1/1/2008 Not reviewed this visit You Were Diagnosed With   
  
 Codes Comments Depression, unspecified depression type    -  Primary ICD-10-CM: F32.9 ICD-9-CM: 162 Chronic pain of both shoulders     ICD-10-CM: M25.511, G89.29, M25.512 ICD-9-CM: 719.41, 338.29 Vitals BP Pulse Temp Resp Height(growth percentile) Weight(growth percentile)  138/70 (BP 1 Location: Left arm, BP Patient Position: Sitting) 79 97.9 °F (36.6 °C) (Oral) 20 5' 3\" (1.6 m) 159 lb (72.1 kg) SpO2 BMI OB Status Smoking Status 97% 28.17 kg/m2 Postmenopausal Never Smoker Vitals History BMI and BSA Data Body Mass Index Body Surface Area  
 28.17 kg/m 2 1.79 m 2 Preferred Pharmacy Pharmacy Name Phone [facility] [place] [address] [phone] Your Updated Medication List  
  
   
This list is accurate as of 5/29/18  1:40 PM.  Always use your most recent med list.  
  
  
  
  
 24 HOUR ALLERGY RELIEF 50 mcg/actuation nasal spray Generic drug:  fluticasone 2 Sprays by Both Nostrils route daily. acetaminophen 650 mg Tber Commonly known as:  TYLENOL Take 650 mg by mouth every eight (8) hours. Arthritis  
  
 dicyclomine 10 mg capsule Commonly known as:  BENTYL TAKE ONE CAPSULE BY MOUTH THREE TIMES A DAY AS NEEDED  
  
 GAS RELIEF 125 mg capsule Generic drug:  simethicone Take 125 mg by mouth four (4) times daily as needed for Flatulence. sertraline 50 mg tablet Commonly known as:  ZOLOFT Take 1 Tab by mouth daily. Prescriptions Sent to Pharmacy Refills  
 sertraline (ZOLOFT) 50 mg tablet 1 Sig: Take 1 Tab by mouth daily. Class: Normal  
 Pharmacy: [facility] at [address] #: [phone] Route: Oral  
  
We Performed the Following REFERRAL TO ORTHOPEDICS [JIW399 Custom] REFERRAL TO PHYSICAL THERAPY [CIT13 Custom] Referral Information Referral ID Referred By Referred To  
  
 [id number] [facility], 1 [facility] MD Rfaa De Guzman [address] [place] [address] [address], [address] [place] [address] Phone: [phone] Fax: [phone] Visits Status Start Date End Date 1 New Request 5/29/18 5/29/19 If your referral has a status of pending review or denied, additional information will be sent to support the outcome of this decision. Referral ID Referred By Referred To  
 [id number] [address] [facility]  
   [address] [facility]  
   [address], [address] [place] [address] Phone: [phone] Fax: [phone] Visits Status Start Date End Date 1 New Request 5/29/18 5/29/19 If your referral has a status of pending review or denied, additional information will be sent to support the outcome of this decision. Patient Instructions Frozen Shoulder: Exercises Your Care Instructions Here are some examples of typical rehabilitation exercises for your condition. Start each exercise slowly. Ease off the exercise if you start to have pain. Your doctor or physical therapist will tell you when you can start these exercises and which ones will work best for you. How to do the exercises Neck stretches 1. Look straight ahead, and tip your right ear to your right shoulder. Do not let your left shoulder rise up as you tip your head to the right. 2. Hold 15 to 30 seconds. 3. Tilt your head to the left. Do not let your right shoulder rise up as you tip your head to the left. 4. Hold for 15 to 30 seconds. 5. Repeat 2 to 4 times to each side. Shoulder rolls 1. Sit comfortably with your feet shoulder-width apart. You can also do this exercise while standing. 2. Roll your shoulders up, then back, and then down in a smooth, circular motion. 3. Repeat 2 to 4 times. Shoulder flexion (lying down) For this exercise, you will need a wand. To make a wand, use a piece of PVC pipe or a broom handle with the broom removed. Make the wand about a foot wider than your shoulders. 1. Lie on your back, holding a wand with your hands. Your palms should face down as you hold the wand. Place your hands slightly wider than your shoulders. 2. Keeping your elbows straight, slowly raise your arms over your head until you feel a stretch in your shoulders, upper back, and chest. 
3. Hold 15 to 30 seconds. 4. Repeat 2 to 4 times. Shoulder rotation (lying down) To make a wand, use a piece of PVC pipe or a broom handle with the broom removed. Make the wand about a foot wider than your shoulders. 1. Lie on your back and hold a wand in both hands with your elbows bent and your palms up. 2. Keeping your elbows close to your body, move the wand across your body toward the arm that has pain. 3. Hold for 15 to 30 seconds. 4. Repeat 2 to 4 times. Shoulder internal rotation with towel 1. Roll up a towel lengthwise. Hold the towel above and behind your head with the arm that is not sore. 2. With your sore arm, reach behind your back and grasp the towel. 3. Using the arm above your head, pull the towel upward until you feel a stretch on the front and outside of your sore shoulder. 4. Hold 15 to 30 seconds. 5. Relax and move the towel back down to the starting position. 6. Repeat 2 to 4 times. Shoulder blade squeeze 1. While standing with your arms at your sides, squeeze your shoulder blades together. Do not raise your shoulders up as you are squeezing. 2. Hold for 6 seconds. 3. Repeat 8 to 12 times. Follow-up care is a key part of your treatment and safety. Be sure to make and go to all appointments, and call your doctor if you are having problems. It's also a good idea to know your test results and keep a list of the medicines you take. Where can you learn more? Go to http://ward-vin.info/. Enter R144 in the search box to learn more about \"Frozen Shoulder: Exercises. \" Current as of: March 21, 2017 Content Version: 11.4 © 5486-6297 [employer]. Care instructions adapted under license by [employer] (which disclaims liability or warranty for this information).  If you have questions about a medical condition or this instruction, always ask your healthcare professional. [address] any warranty or liability for your use of this information. Introducing [facility] [place] [facility]! Dear Kandy Koki: 
Thank you for requesting a Clicker account. Our records indicate that you already have an active Clicker account. You can access your account anytime at [email or web address] Did you know that you can access your hospital and ER discharge instructions at any time in Clicker? You can also review all of your test results from your hospital stay or ER visit. Additional Information If you have questions, please visit the Frequently Asked Questions section of the Clicker website at [email or web address] Remember, Clicker is NOT to be used for urgent needs. For medical emergencies, dial 911. Now available from your iPhone and Android! Please provide this summary of care documentation to your next provider. Your primary care clinician is listed as Gabino Barnett. If you have any questions after today's visit, please call [phone].

## 2018-05-29 NOTE — PROGRESS NOTES
Saira Castro is a 80 y.o. female (: 3/13/1931) presenting to address:    Chief Complaint   Patient presents with    Generalized Body Aches     From fall    Depression   Med refill    Vitals:    18 1315   BP: 138/70   Pulse: 79   Resp: 20   Temp: 97.9 °F (36.6 °C)   TempSrc: Oral   SpO2: 97%   Weight: 159 lb (72.1 kg)   Height: 5' 3\" (1.6 m)   PainSc:   6   PainLoc: Generalized       Learning Assessment:     Learning Assessment 2015   PRIMARY LEARNER Patient   HIGHEST LEVEL OF EDUCATION - PRIMARY LEARNER  SOME COLLEGE   BARRIERS PRIMARY LEARNER HEARING   CO-LEARNER CAREGIVER No   PRIMARY LANGUAGE ENGLISH    NEED -   LEARNER PREFERENCE PRIMARY READING   LEARNING SPECIAL TOPICS -   ANSWERED BY self   RELATIONSHIP SELF     Depression Screening:     PHQ over the last two weeks 2018   PHQ Not Done Active Diagnosis of Depression or Bipolar Disorder     Fall Risk Assessment:     Fall Risk Assessment, last 12 mths 2018   Able to walk? Yes   Fall in past 12 months? Yes   Fall with injury? Yes   Number of falls in past 12 months 1   Fall Risk Score 2     Abuse Screening:     Abuse Screening Questionnaire 2017   Do you ever feel afraid of your partner? N   Are you in a relationship with someone who physically or mentally threatens you? N   Is it safe for you to go home? Y        1. Have you been to the ER, urgent care clinic since your last visit? Hospitalized since your last visit? YES [facility] 18    Coordination of Care Questionaire:    2. Have you seen or consulted any other health care providers outside of the [address] [place] [address] since your last visit? Include any pap smears or colon screening. NO    Advanced Directive:   1. Do you have an Advanced Directive? YES    2. Would you like information on Advanced Directives?  NO

## 2018-06-29 ENCOUNTER — HOME HEALTH ADMISSION (OUTPATIENT)
Dept: HOME HEALTH SERVICES | Facility: HOME HEALTH | Age: 83
End: 2018-06-29

## 2018-06-29 ENCOUNTER — OFFICE VISIT (OUTPATIENT)
Dept: FAMILY MEDICINE CLINIC | Age: 83
End: 2018-06-29

## 2018-06-29 VITALS
WEIGHT: 157 LBS | SYSTOLIC BLOOD PRESSURE: 119 MMHG | BODY MASS INDEX: 27.82 KG/M2 | HEIGHT: 63 IN | TEMPERATURE: 96 F | OXYGEN SATURATION: 94 % | RESPIRATION RATE: 20 BRPM | HEART RATE: 74 BPM | DIASTOLIC BLOOD PRESSURE: 59 MMHG

## 2018-06-29 DIAGNOSIS — I95.1 ORTHOSTATIC HYPOTENSION: ICD-10-CM

## 2018-06-29 DIAGNOSIS — R42 DIZZINESS: Primary | ICD-10-CM

## 2018-06-29 DIAGNOSIS — R26.81 UNSTEADY GAIT: ICD-10-CM

## 2018-06-29 RX ORDER — SERTRALINE HYDROCHLORIDE 25 MG/1
12.5 TABLET, FILM COATED ORAL DAILY
COMMUNITY
End: 2018-07-03

## 2018-06-29 NOTE — PROGRESS NOTES
[address]     Chief Complaint   Patient presents with    Dizziness     Vitals:    06/29/18 1326 06/29/18 1357 06/29/18 1358   BP: 129/64 143/68 119/59   Pulse: 77 67 74   Resp: 20     Temp: 96 °F (35.6 °C)     TempSrc: Oral     SpO2: 93% 93% 94%   Weight: 157 lb (71.2 kg)     Height: 5' 3\" (1.6 m)     PainSc:   0 - No pain           HPI: Patient is complaining about dizziness she says going on for the last few month, she has been under a lot of stress trying to sell her house and relocated into a assisted living facility. She verbalized many stressors in her daily life. She had decreased her Zoloft from 25-12.5 on her wound she is just dividing tablet to she could not remember the exact time that she had in the. Patient has no vertigo no headache, no chest pain, no shortness of breath, no numbness or weakness. She has no nausea or vomiting or diarrhea she does suffer from IBS and she take medication for that. Orthostatic blood pressure measuring once when she was laying down it was 143/68 with a pulse of 67, standing up was 119/59 with a pulse of 74. This is a significant difference so she does have orthostatic hypotension.     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    Diverticula of colon     Gastrointestinal disorder     High cholesterol     IBS (irritable bowel syndrome)     Kidney stone     Other ill-defined conditions(799.89)     heart murmur    Psychiatric disorder     anxiety    Skin cancer     Stroke [facility])      Past Surgical History:   Procedure Laterality Date    HX GYN  1970s    [facility] tack in [facility]     HX OTHER SURGICAL  1980    cystocele    HX UROLOGICAL      bladder tac     Social History   Substance Use Topics    Smoking status: Never Smoker    Smokeless tobacco: Never Used    Alcohol use No       Family History   Problem Relation Age of Onset    Heart Attack Father     Heart Attack Brother Review of Systems   Constitutional: Negative for chills, fever, malaise/fatigue and weight loss. HENT: Negative for congestion, ear discharge, ear pain, hearing loss, nosebleeds, sinus pain and sore throat. Eyes: Negative for blurred vision, double vision and discharge. Respiratory: Negative for cough, hemoptysis, sputum production and shortness of breath. Cardiovascular: Negative for chest pain, palpitations, claudication and leg swelling. Gastrointestinal: Negative for abdominal pain, constipation, diarrhea, nausea and vomiting. Genitourinary: Negative for dysuria, frequency, hematuria and urgency. Musculoskeletal: Negative for back pain, joint pain, myalgias and neck pain. Skin: Negative for itching and rash. Neurological: Positive for dizziness. Negative for tingling, sensory change, speech change, focal weakness, weakness and headaches. Psychiatric/Behavioral: Negative for depression, hallucinations, substance abuse and suicidal ideas. The patient is nervous/anxious. Physical Exam   Constitutional: She is oriented to person, place, and time. She appears well-developed and well-nourished. No distress. HENT:   Head: Normocephalic and atraumatic. Mouth/Throat: Oropharynx is clear and moist. No oropharyngeal exudate. Eyes: Conjunctivae are normal. Pupils are equal, round, and reactive to light. Right eye exhibits no discharge. Left eye exhibits no discharge. No scleral icterus. Neck: Normal range of motion. Neck supple. No thyromegaly present. Cardiovascular: Normal rate, regular rhythm and normal heart sounds. No murmur heard. Pulmonary/Chest: Effort normal and breath sounds normal. No respiratory distress. She has no wheezes. She has no rales. She exhibits no tenderness. Abdominal: Soft. She exhibits no distension. There is no tenderness. There is no rebound. Musculoskeletal: She exhibits no edema.    Patient is steady especially when she gets up the first 2 steps   Lymphadenopathy:     She has no cervical adenopathy. Neurological: She is alert and oriented to person, place, and time. No cranial nerve deficit. Coordination normal.   Skin: Skin is warm and dry. No rash noted. She is not diaphoretic. No erythema. No pallor. Psychiatric: She has a normal mood and affect. Her behavior is normal. Judgment and thought content normal.        Assessment and plan     1. Dizziness    - [address] [facility],[address]    2. Unsteady gait    - [address] [facility],[address]    3. Orthostatic hypotension       Patient was advised to stay hydrated, and to resume her Zoloft as a 25 mg instead of 12.5, because decreasing the dose of Zoloft might have been causing her dizziness. Also she need evaluation with home health for physical therapy and occupational therapy evaluation. She may need to have a cane or a walker for support. Current Outpatient Prescriptions   Medication Sig Dispense Refill    sertraline (ZOLOFT) 25 mg tablet Take 12.5 mg by mouth daily. 1/2 tab QHS      simethicone (GAS RELIEF) 125 mg capsule Take 125 mg by mouth four (4) times daily as needed for Flatulence.  acetaminophen (TYLENOL) 650 mg TbER Take 650 mg by mouth every eight (8) hours. Arthritis      fluticasone (24 HOUR ALLERGY RELIEF) 50 mcg/actuation nasal spray 2 Sprays by Both Nostrils route daily.       dicyclomine (BENTYL) 10 mg capsule TAKE ONE CAPSULE BY MOUTH THREE TIMES A DAY AS NEEDED 90 Cap 0       Patient Active Problem List    Diagnosis Date Noted    Transient cerebral ischemia 11/29/2016    Chronic right shoulder pain 09/01/2016    ESTHER (obstructive sleep apnea) 07/27/2015    DNR (do not resuscitate) 05/15/2015    CKD (chronic kidney disease) stage 3, GFR 30-59 ml/min 02/24/2015    Cerebral microvascular disease 08/26/2014    IBS (irritable bowel syndrome)     Nephrolithiasis 07/15/2014    Anxiety 01/21/2014    PND (post-nasal drip) 08/23/2013    OA (osteoarthritis) 06/13/2013    Depression 04/08/2013    HLD (hyperlipidemia) 04/08/2013    Osteopenia 04/08/2013    Diverticulosis 04/08/2013    Allergic rhinitis 04/08/2013    Hearing loss of both ears 04/08/2013     Results for orders placed or performed during the hospital encounter of 04/25/18   URINALYSIS W/ RFLX MICROSCOPIC   Result Value Ref Range    Color YELLOW      Appearance CLEAR      Specific gravity 1.023 1.005 - 1.030      pH (UA) 7.0 5.0 - 8.0      Protein NEGATIVE  NEG mg/dL    Glucose NEGATIVE  NEG mg/dL    Ketone NEGATIVE  NEG mg/dL    Bilirubin NEGATIVE  NEG      Blood NEGATIVE  NEG      Urobilinogen 0.2 0.2 - 1.0 EU/dL    Nitrites NEGATIVE  NEG      Leukocyte Esterase TRACE (A) NEG     CBC WITH AUTOMATED DIFF   Result Value Ref Range    WBC 6.2 4.6 - 13.2 K/uL    RBC 4.28 4.20 - 5.30 M/uL    HGB 13.4 12.0 - 16.0 g/dL    HCT 40.8 35.0 - 45.0 %    MCV 95.3 74.0 - 97.0 FL    MCH 31.3 24.0 - 34.0 PG    MCHC 32.8 31.0 - 37.0 g/dL    RDW 13.3 11.6 - 14.5 %    PLATELET 722 262 - 001 K/uL    MPV 11.8 9.2 - 11.8 FL    NEUTROPHILS 58 40 - 73 %    LYMPHOCYTES 31 21 - 52 %    MONOCYTES 8 3 - 10 %    EOSINOPHILS 2 0 - 5 %    BASOPHILS 1 0 - 2 %    ABS. NEUTROPHILS 3.7 1.8 - 8.0 K/UL    ABS. LYMPHOCYTES 1.9 0.9 - 3.6 K/UL    ABS. MONOCYTES 0.5 0.05 - 1.2 K/UL    ABS. EOSINOPHILS 0.1 0.0 - 0.4 K/UL    ABS.  BASOPHILS 0.0 0.0 - 0.06 K/UL    DF AUTOMATED     METABOLIC PANEL, BASIC   Result Value Ref Range    Sodium 142 136 - 145 mmol/L    Potassium 4.1 3.5 - 5.5 mmol/L    Chloride 109 (H) 100 - 108 mmol/L    CO2 29 21 - 32 mmol/L    Anion gap 4 3.0 - 18 mmol/L    Glucose 91 74 - 99 mg/dL    BUN 20 (H) 7.0 - 18 MG/DL    Creatinine 0.86 0.6 - 1.3 MG/DL    BUN/Creatinine ratio 23 (H) 12 - 20      GFR est AA >60 >60 ml/min/1.73m2    GFR est non-AA >60 >60 ml/min/1.73m2    Calcium 8.8 8.5 - 10.1 MG/DL   LIPASE   Result Value Ref Range    Lipase 133 73 - 393 U/L   URINE MICROSCOPIC ONLY   Result Value Ref Range    WBC 0 to 1 0 - 4 /hpf    RBC 0 0 - 5 /hpf    Epithelial cells 2+ 0 - 5 /lpf    Bacteria NEGATIVE  NEG /hpf     Admission on 04/25/2018, Discharged on 04/25/2018   Component Date Value Ref Range Status    Color 04/25/2018 YELLOW    Final    Appearance 04/25/2018 CLEAR    Final    Specific gravity 04/25/2018 1.023  1.005 - 1.030   Final    pH (UA) 04/25/2018 7.0  5.0 - 8.0   Final    Protein 04/25/2018 NEGATIVE   NEG mg/dL Final    Glucose 04/25/2018 NEGATIVE   NEG mg/dL Final    Ketone 04/25/2018 NEGATIVE   NEG mg/dL Final    Bilirubin 04/25/2018 NEGATIVE   NEG   Final    Blood 04/25/2018 NEGATIVE   NEG   Final    Urobilinogen 04/25/2018 0.2  0.2 - 1.0 EU/dL Final    Nitrites 04/25/2018 NEGATIVE   NEG   Final    Leukocyte Esterase 04/25/2018 TRACE* NEG   Final    WBC 04/25/2018 6.2  4.6 - 13.2 K/uL Final    RBC 04/25/2018 4.28  4.20 - 5.30 M/uL Final    HGB 04/25/2018 13.4  12.0 - 16.0 g/dL Final    HCT 04/25/2018 40.8  35.0 - 45.0 % Final    MCV 04/25/2018 95.3  74.0 - 97.0 FL Final    MCH 04/25/2018 31.3  24.0 - 34.0 PG Final    MCHC 04/25/2018 32.8  31.0 - 37.0 g/dL Final    RDW 04/25/2018 13.3  11.6 - 14.5 % Final    PLATELET 27/68/2671 224  135 - 420 K/uL Final    MPV 04/25/2018 11.8  9.2 - 11.8 FL Final    NEUTROPHILS 04/25/2018 58  40 - 73 % Final    LYMPHOCYTES 04/25/2018 31  21 - 52 % Final    MONOCYTES 04/25/2018 8  3 - 10 % Final    EOSINOPHILS 04/25/2018 2  0 - 5 % Final    BASOPHILS 04/25/2018 1  0 - 2 % Final    ABS. NEUTROPHILS 04/25/2018 3.7  1.8 - 8.0 K/UL Final    ABS. LYMPHOCYTES 04/25/2018 1.9  0.9 - 3.6 K/UL Final    ABS. MONOCYTES 04/25/2018 0.5  0.05 - 1.2 K/UL Final    ABS. EOSINOPHILS 04/25/2018 0.1  0.0 - 0.4 K/UL Final    ABS.  BASOPHILS 04/25/2018 0.0  0.0 - 0.06 K/UL Final    DF 04/25/2018 AUTOMATED    Final    Sodium 04/25/2018 142  136 - 145 mmol/L Final    Potassium 04/25/2018 4.1  3.5 - 5.5 mmol/L Final    Chloride 04/25/2018 109* 100 - 108 mmol/L Final    CO2 04/25/2018 29  21 - 32 mmol/L Final    Anion gap 04/25/2018 4  3.0 - 18 mmol/L Final    Glucose 04/25/2018 91  74 - 99 mg/dL Final    BUN 04/25/2018 20* 7.0 - 18 MG/DL Final    Creatinine 04/25/2018 0.86  0.6 - 1.3 MG/DL Final    BUN/Creatinine ratio 04/25/2018 23* 12 - 20   Final    GFR est AA 04/25/2018 >60  >60 ml/min/1.73m2 Final    GFR est non-AA 04/25/2018 >60  >60 ml/min/1.73m2 Final    Comment: (NOTE)  Estimated GFR is calculated using the Modification of Diet in Renal   Disease (MDRD) Study equation, reported for both  Americans   (GFRAA) and non- Americans (GFRNA), and normalized to 1.73m2   body surface area. The physician must decide which value applies to   the patient. The MDRD study equation should only be used in   individuals age 25 or older. It has not been validated for the   following: pregnant women, patients with serious comorbid conditions,   or on certain medications, or persons with extremes of body size,   muscle mass, or nutritional status.  Calcium 04/25/2018 8.8  8.5 - 10.1 MG/DL Final    Lipase 04/25/2018 133  73 - 393 U/L Final    WBC 04/25/2018 0 to 1  0 - 4 /hpf Final    RBC 04/25/2018 0  0 - 5 /hpf Final    Epithelial cells 04/25/2018 2+  0 - 5 /lpf Final    Bacteria 04/25/2018 NEGATIVE   NEG /hpf Final          Follow-up Disposition:  Return in about 1 week (around 7/6/2018), or if symptoms worsen or fail to improve.

## 2018-06-29 NOTE — PROGRESS NOTES
Soco Mclain is a 80 y.o. female (: 3/13/1931) presenting to address:    Chief Complaint   Patient presents with    Dizziness       Vitals:    18 1326   BP: 129/64   Pulse: 77   Resp: 20   Temp: 96 °F (35.6 °C)   TempSrc: Oral   SpO2: 93%   Weight: 157 lb (71.2 kg)   Height: 5' 3\" (1.6 m)   PainSc:   0 - No pain       Hearing/Vision:   No exam data present    Learning Assessment:     Learning Assessment 2015   PRIMARY LEARNER Patient   HIGHEST LEVEL OF EDUCATION - PRIMARY LEARNER  SOME COLLEGE   BARRIERS PRIMARY LEARNER HEARING   CO-LEARNER CAREGIVER No   PRIMARY LANGUAGE ENGLISH    NEED -   LEARNER PREFERENCE PRIMARY READING   LEARNING SPECIAL TOPICS -   ANSWERED BY self   RELATIONSHIP SELF     Depression Screening:     PHQ over the last two weeks 2018   PHQ Not Done Active Diagnosis of Depression or Bipolar Disorder     Fall Risk Assessment:     Fall Risk Assessment, last 12 mths 2018   Able to walk? Yes   Fall in past 12 months? Yes   Fall with injury? Yes   Number of falls in past 12 months 1   Fall Risk Score 2     Abuse Screening:     Abuse Screening Questionnaire 2017   Do you ever feel afraid of your partner? N   Are you in a relationship with someone who physically or mentally threatens you? N   Is it safe for you to go home? Y     Coordination of Care Questionaire:   1. Have you been to the ER, urgent care clinic since your last visit? Hospitalized since your last visit? NO    2. Have you seen or consulted any other health care providers outside of the [address] [place] [address] since your last visit? Include any pap smears or colon screening.  NO

## 2018-06-30 ENCOUNTER — HOME CARE VISIT (OUTPATIENT)
Dept: SCHEDULING | Facility: HOME HEALTH | Age: 83
End: 2018-06-30

## 2018-07-03 ENCOUNTER — OFFICE VISIT (OUTPATIENT)
Dept: FAMILY MEDICINE CLINIC | Age: 83
End: 2018-07-03

## 2018-07-03 VITALS
TEMPERATURE: 95 F | RESPIRATION RATE: 18 BRPM | OXYGEN SATURATION: 96 % | HEART RATE: 83 BPM | BODY MASS INDEX: 27.82 KG/M2 | SYSTOLIC BLOOD PRESSURE: 133 MMHG | HEIGHT: 63 IN | WEIGHT: 157 LBS | DIASTOLIC BLOOD PRESSURE: 72 MMHG

## 2018-07-03 DIAGNOSIS — R42 DIZZINESS: ICD-10-CM

## 2018-07-03 DIAGNOSIS — F32.A DEPRESSION, UNSPECIFIED DEPRESSION TYPE: Primary | ICD-10-CM

## 2018-07-03 RX ORDER — ESCITALOPRAM OXALATE 5 MG/1
5 TABLET ORAL DAILY
Qty: 30 TAB | Refills: 1 | Status: SHIPPED | OUTPATIENT
Start: 2018-07-03 | End: 2018-08-22 | Stop reason: SDUPTHER

## 2018-07-03 NOTE — PROGRESS NOTES
Florence Cowan is a 80 y.o. female (: 3/13/1931) presenting to address:    Chief Complaint   Patient presents with    Dizziness       Vitals:    18 1500   BP: 134/67   Pulse: 84   Resp: 18   Temp: 95 °F (35 °C)   SpO2: 94%   Weight: 157 lb (71.2 kg)   Height: 5' 3\" (1.6 m)   PainSc:   0 - No pain       Hearing/Vision:   No exam data present    Learning Assessment:     Learning Assessment 2015   PRIMARY LEARNER Patient   HIGHEST LEVEL OF EDUCATION - PRIMARY LEARNER  SOME COLLEGE   BARRIERS PRIMARY LEARNER HEARING   CO-LEARNER CAREGIVER No   PRIMARY LANGUAGE ENGLISH    NEED -   LEARNER PREFERENCE PRIMARY READING   LEARNING SPECIAL TOPICS -   ANSWERED BY self   RELATIONSHIP SELF     Depression Screening:     PHQ over the last two weeks 2018   PHQ Not Done Active Diagnosis of Depression or Bipolar Disorder     Fall Risk Assessment:     Fall Risk Assessment, last 12 mths 7/3/2018   Able to walk? Yes   Fall in past 12 months? Yes   Fall with injury? No   Number of falls in past 12 months 4   Fall Risk Score 4     Abuse Screening:     Abuse Screening Questionnaire 2017   Do you ever feel afraid of your partner? N   Are you in a relationship with someone who physically or mentally threatens you? N   Is it safe for you to go home? Y     Coordination of Care Questionaire:   1. Have you been to the ER, urgent care clinic since your last visit? Hospitalized since your last visit? NO    2. Have you seen or consulted any other health care providers outside of the [address] [place] [address] since your last visit? Include any pap smears or colon screening.  NO

## 2018-07-03 NOTE — PROGRESS NOTES
[address]     Chief Complaint   Patient presents with    Dizziness     Vitals:    07/03/18 1500 07/03/18 1621 07/03/18 1624   BP: 134/67 139/67 133/72   Pulse: 84 73 83   Resp: 18     Temp: 95 °F (35 °C)     SpO2: 94% 95% 96%   Weight: 157 lb (71.2 kg)     Height: 5' 3\" (1.6 m)     PainSc:   0 - No pain           HPI: Patient is here for follow-up for her dizziness she says she could not find appointment with Dr. Loretta Pickering , she has been feeling better as far as a dizziness, but she has been depressed she is crying she feels lonely, she did [facility]. Patient is not this is not suicidal.    She stopped Zoloft she said she decreased the dose and then she stopped because she thinks that was causing her dizziness. 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    Diverticula of colon     Gastrointestinal disorder     High cholesterol     IBS (irritable bowel syndrome)     Kidney stone     Other ill-defined conditions(799.89)     heart murmur    Psychiatric disorder     anxiety    Skin cancer     Stroke [facility])      Past Surgical History:   Procedure Laterality Date    HX GYN  1970s    bladder tack in [facility]     HX OTHER SURGICAL  1980    cystocele    HX UROLOGICAL      bladder tac     Social History   Substance Use Topics    Smoking status: Never Smoker    Smokeless tobacco: Never Used    Alcohol use No       Family History   Problem Relation Age of Onset    Heart Attack Father     Heart Attack Brother        Review of Systems   Constitutional: Negative for chills, fever, malaise/fatigue and weight loss. HENT: Positive for sore throat. Negative for congestion, ear discharge, ear pain, hearing loss, nosebleeds and sinus pain. Eyes: Negative for blurred vision, double vision and discharge. Respiratory: Negative for cough, hemoptysis, sputum production, shortness of breath and wheezing. Cardiovascular: Negative for chest pain, palpitations, claudication and leg swelling. Gastrointestinal: Negative for abdominal pain, constipation, diarrhea, nausea and vomiting. Genitourinary: Negative for dysuria, frequency and urgency. Musculoskeletal: Positive for falls. Negative for back pain, joint pain, myalgias and neck pain. Skin: Negative for itching and rash. Neurological: Positive for dizziness. Negative for tingling, sensory change, speech change, focal weakness, weakness and headaches. Psychiatric/Behavioral: Positive for depression. Negative for hallucinations, substance abuse and suicidal ideas. The patient is not nervous/anxious and does not have insomnia. Physical Exam   Constitutional: She is oriented to person, place, and time. She appears well-developed and well-nourished. No distress. HENT:   Head: Normocephalic and atraumatic. Mouth/Throat: Oropharynx is clear and moist. No oropharyngeal exudate. Eyes: Conjunctivae are normal. Pupils are equal, round, and reactive to light. Right eye exhibits no discharge. Left eye exhibits no discharge. No scleral icterus. Neck: Neck supple. No thyromegaly present. Cardiovascular: Normal rate, regular rhythm and normal heart sounds. No murmur heard. Pulmonary/Chest: Effort normal and breath sounds normal. No respiratory distress. She has no wheezes. She has no rales. She exhibits no tenderness. Abdominal: Soft. She exhibits no distension. There is no tenderness. There is no rebound. Musculoskeletal: Normal range of motion. She exhibits no edema, tenderness or deformity. Patient gait was normal and steady    She did not feel dizziness from changing position sitting standing or laying down to standing      Today there is no orthostatic hypotension   Lymphadenopathy:     She has no cervical adenopathy. Neurological: She is alert and oriented to person, place, and time. No cranial nerve deficit.  Coordination normal. Skin: Skin is warm and dry. No rash noted. She is not diaphoretic. No erythema. No pallor. Psychiatric: She has a normal mood and affect. Her behavior is normal. Judgment and thought content normal.        Assessment and plan     1. Depression, unspecified depression type    - escitalopram oxalate (LEXAPRO) 5 mg tablet; Take 1 Tab by mouth daily. Dispense: 30 Tab; Refill: 1    Advised to take 1 tablet daily, and if her symptoms get worse and she feels very depressed or suicidal she needs to go to emergency room patient verbalized understanding. Nurse navigator was contacted to reach out to the patient. 2. Dizziness  Improved there is no orthostatic hypotension today      Patient is to follow-up in 2 weeks with her PCP    Current Outpatient Prescriptions   Medication Sig Dispense Refill    escitalopram oxalate (LEXAPRO) 5 mg tablet Take 1 Tab by mouth daily. 30 Tab 1    simethicone (GAS RELIEF) 125 mg capsule Take 125 mg by mouth four (4) times daily as needed for Flatulence.  acetaminophen (TYLENOL) 650 mg TbER Take 650 mg by mouth every eight (8) hours. Arthritis      fluticasone (24 HOUR ALLERGY RELIEF) 50 mcg/actuation nasal spray 2 Sprays by Both Nostrils route daily.       dicyclomine (BENTYL) 10 mg capsule TAKE ONE CAPSULE BY MOUTH THREE TIMES A DAY AS NEEDED 90 Cap 0       Patient Active Problem List    Diagnosis Date Noted    Transient cerebral ischemia 11/29/2016    Chronic right shoulder pain 09/01/2016    ESTHER (obstructive sleep apnea) 07/27/2015    DNR (do not resuscitate) 05/15/2015    CKD (chronic kidney disease) stage 3, GFR 30-59 ml/min 02/24/2015    Cerebral microvascular disease 08/26/2014    IBS (irritable bowel syndrome)     Nephrolithiasis 07/15/2014    Anxiety 01/21/2014    PND (post-nasal drip) 08/23/2013    OA (osteoarthritis) 06/13/2013    Depression 04/08/2013    HLD (hyperlipidemia) 04/08/2013    Osteopenia 04/08/2013    Diverticulosis 04/08/2013    Allergic rhinitis 04/08/2013    Hearing loss of both ears 04/08/2013     Results for orders placed or performed during the hospital encounter of 04/25/18   URINALYSIS W/ RFLX MICROSCOPIC   Result Value Ref Range    Color YELLOW      Appearance CLEAR      Specific gravity 1.023 1.005 - 1.030      pH (UA) 7.0 5.0 - 8.0      Protein NEGATIVE  NEG mg/dL    Glucose NEGATIVE  NEG mg/dL    Ketone NEGATIVE  NEG mg/dL    Bilirubin NEGATIVE  NEG      Blood NEGATIVE  NEG      Urobilinogen 0.2 0.2 - 1.0 EU/dL    Nitrites NEGATIVE  NEG      Leukocyte Esterase TRACE (A) NEG     CBC WITH AUTOMATED DIFF   Result Value Ref Range    WBC 6.2 4.6 - 13.2 K/uL    RBC 4.28 4.20 - 5.30 M/uL    HGB 13.4 12.0 - 16.0 g/dL    HCT 40.8 35.0 - 45.0 %    MCV 95.3 74.0 - 97.0 FL    MCH 31.3 24.0 - 34.0 PG    MCHC 32.8 31.0 - 37.0 g/dL    RDW 13.3 11.6 - 14.5 %    PLATELET 949 089 - 384 K/uL    MPV 11.8 9.2 - 11.8 FL    NEUTROPHILS 58 40 - 73 %    LYMPHOCYTES 31 21 - 52 %    MONOCYTES 8 3 - 10 %    EOSINOPHILS 2 0 - 5 %    BASOPHILS 1 0 - 2 %    ABS. NEUTROPHILS 3.7 1.8 - 8.0 K/UL    ABS. LYMPHOCYTES 1.9 0.9 - 3.6 K/UL    ABS. MONOCYTES 0.5 0.05 - 1.2 K/UL    ABS. EOSINOPHILS 0.1 0.0 - 0.4 K/UL    ABS.  BASOPHILS 0.0 0.0 - 0.06 K/UL    DF AUTOMATED     METABOLIC PANEL, BASIC   Result Value Ref Range    Sodium 142 136 - 145 mmol/L    Potassium 4.1 3.5 - 5.5 mmol/L    Chloride 109 (H) 100 - 108 mmol/L    CO2 29 21 - 32 mmol/L    Anion gap 4 3.0 - 18 mmol/L    Glucose 91 74 - 99 mg/dL    BUN 20 (H) 7.0 - 18 MG/DL    Creatinine 0.86 0.6 - 1.3 MG/DL    BUN/Creatinine ratio 23 (H) 12 - 20      GFR est AA >60 >60 ml/min/1.73m2    GFR est non-AA >60 >60 ml/min/1.73m2    Calcium 8.8 8.5 - 10.1 MG/DL   LIPASE   Result Value Ref Range    Lipase 133 73 - 393 U/L   URINE MICROSCOPIC ONLY   Result Value Ref Range    WBC 0 to 1 0 - 4 /hpf    RBC 0 0 - 5 /hpf    Epithelial cells 2+ 0 - 5 /lpf    Bacteria NEGATIVE  NEG /hpf     Admission on 04/25/2018, Discharged on 04/25/2018   Component Date Value Ref Range Status    Color 04/25/2018 YELLOW    Final    Appearance 04/25/2018 CLEAR    Final    Specific gravity 04/25/2018 1.023  1.005 - 1.030   Final    pH (UA) 04/25/2018 7.0  5.0 - 8.0   Final    Protein 04/25/2018 NEGATIVE   NEG mg/dL Final    Glucose 04/25/2018 NEGATIVE   NEG mg/dL Final    Ketone 04/25/2018 NEGATIVE   NEG mg/dL Final    Bilirubin 04/25/2018 NEGATIVE   NEG   Final    Blood 04/25/2018 NEGATIVE   NEG   Final    Urobilinogen 04/25/2018 0.2  0.2 - 1.0 EU/dL Final    Nitrites 04/25/2018 NEGATIVE   NEG   Final    Leukocyte Esterase 04/25/2018 TRACE* NEG   Final    WBC 04/25/2018 6.2  4.6 - 13.2 K/uL Final    RBC 04/25/2018 4.28  4.20 - 5.30 M/uL Final    HGB 04/25/2018 13.4  12.0 - 16.0 g/dL Final    HCT 04/25/2018 40.8  35.0 - 45.0 % Final    MCV 04/25/2018 95.3  74.0 - 97.0 FL Final    MCH 04/25/2018 31.3  24.0 - 34.0 PG Final    MCHC 04/25/2018 32.8  31.0 - 37.0 g/dL Final    RDW 04/25/2018 13.3  11.6 - 14.5 % Final    PLATELET 81/10/1040 718  135 - 420 K/uL Final    MPV 04/25/2018 11.8  9.2 - 11.8 FL Final    NEUTROPHILS 04/25/2018 58  40 - 73 % Final    LYMPHOCYTES 04/25/2018 31  21 - 52 % Final    MONOCYTES 04/25/2018 8  3 - 10 % Final    EOSINOPHILS 04/25/2018 2  0 - 5 % Final    BASOPHILS 04/25/2018 1  0 - 2 % Final    ABS. NEUTROPHILS 04/25/2018 3.7  1.8 - 8.0 K/UL Final    ABS. LYMPHOCYTES 04/25/2018 1.9  0.9 - 3.6 K/UL Final    ABS. MONOCYTES 04/25/2018 0.5  0.05 - 1.2 K/UL Final    ABS. EOSINOPHILS 04/25/2018 0.1  0.0 - 0.4 K/UL Final    ABS.  BASOPHILS 04/25/2018 0.0  0.0 - 0.06 K/UL Final    DF 04/25/2018 AUTOMATED    Final    Sodium 04/25/2018 142  136 - 145 mmol/L Final    Potassium 04/25/2018 4.1  3.5 - 5.5 mmol/L Final    Chloride 04/25/2018 109* 100 - 108 mmol/L Final    CO2 04/25/2018 29  21 - 32 mmol/L Final    Anion gap 04/25/2018 4  3.0 - 18 mmol/L Final    Glucose 04/25/2018 91  74 - 99 mg/dL Final  BUN 04/25/2018 20* 7.0 - 18 MG/DL Final    Creatinine 04/25/2018 0.86  0.6 - 1.3 MG/DL Final    BUN/Creatinine ratio 04/25/2018 23* 12 - 20   Final    GFR est AA 04/25/2018 >60  >60 ml/min/1.73m2 Final    GFR est non-AA 04/25/2018 >60  >60 ml/min/1.73m2 Final    Comment: (NOTE)  Estimated GFR is calculated using the Modification of Diet in Renal   Disease (MDRD) Study equation, reported for both  Americans   (GFRAA) and non- Americans (GFRNA), and normalized to 1.73m2   body surface area. The physician must decide which value applies to   the patient. The MDRD study equation should only be used in   individuals age 25 or older. It has not been validated for the   following: pregnant women, patients with serious comorbid conditions,   or on certain medications, or persons with extremes of body size,   muscle mass, or nutritional status.       Calcium 04/25/2018 8.8  8.5 - 10.1 MG/DL Final    Lipase 04/25/2018 133  73 - 393 U/L Final    WBC 04/25/2018 0 to 1  0 - 4 /hpf Final    RBC 04/25/2018 0  0 - 5 /hpf Final    Epithelial cells 04/25/2018 2+  0 - 5 /lpf Final    Bacteria 04/25/2018 NEGATIVE   NEG /hpf Final          Follow-up Disposition: Not on File

## 2018-08-22 DIAGNOSIS — F32.A DEPRESSION, UNSPECIFIED DEPRESSION TYPE: ICD-10-CM

## 2018-08-22 NOTE — TELEPHONE ENCOUNTER
Pt is asking for a refill on her Lexapro. Please send to [email or web address]. Pt has moved to [facility], a senior center on [address]. She had asked for Xanax but script denied without an appt. She has a lot of stress from the move and \"needs something\".

## 2018-08-22 NOTE — TELEPHONE ENCOUNTER
Dr. Binnie Cranker, please see refill request for patient, thank you! Requested Prescriptions     Pending Prescriptions Disp Refills    escitalopram oxalate (LEXAPRO) 5 mg tablet 90 Tab 1     Sig: Take 1 Tab by mouth daily.

## 2018-08-23 RX ORDER — ESCITALOPRAM OXALATE 5 MG/1
5 TABLET ORAL DAILY
Qty: 30 TAB | Refills: 0 | Status: SHIPPED | OUTPATIENT
Start: 2018-08-23 | End: 2018-09-11 | Stop reason: ALTCHOICE

## 2018-08-23 NOTE — TELEPHONE ENCOUNTER
Pt is in need of a prescription refill of the following medication and has a future appt to see Dr. Ney Jimenez for a med refill appt. Pt would like to know if she would be able to be prescribed a sample until day of appt if possible because she is completely out of medication. Pt would like a call once the medication is ordered, please advise.     Future Appointments  Date Time Provider Adalid Asif   9/11/2018 10:45 AM Delphine Delacruz MD [facility]

## 2018-08-24 ENCOUNTER — TELEPHONE (OUTPATIENT)
Dept: FAMILY MEDICINE CLINIC | Age: 83
End: 2018-08-24

## 2018-08-24 NOTE — TELEPHONE ENCOUNTER
Left pt msg that she needs to be seen for eval. Pt returned call. Pt is experiencing more anxiety with moving into the senior place. Scheduled for next same day 8/27/18. Pt did  Lexapro Rx and was advised that Zoloft was d/c for her.

## 2018-08-24 NOTE — TELEPHONE ENCOUNTER
Patient was transferred from the answering service to the . Patient was in the middle of having panic attack. Patient just recently moved and has missed placed her prescription for her sertraline (ZOLOFT) 50 mg tablet. Advised the patient that another provider filled a perscription for her lexapro but she states that this is not helping her anxiety attacks. Please assist with a phone call to the patient.

## 2018-09-11 ENCOUNTER — OFFICE VISIT (OUTPATIENT)
Dept: FAMILY MEDICINE CLINIC | Age: 83
End: 2018-09-11

## 2018-09-11 VITALS
BODY MASS INDEX: 27.46 KG/M2 | TEMPERATURE: 98.9 F | HEIGHT: 63 IN | DIASTOLIC BLOOD PRESSURE: 82 MMHG | OXYGEN SATURATION: 97 % | HEART RATE: 77 BPM | WEIGHT: 155 LBS | SYSTOLIC BLOOD PRESSURE: 130 MMHG | RESPIRATION RATE: 20 BRPM

## 2018-09-11 DIAGNOSIS — F43.23 ADJUSTMENT DISORDER WITH MIXED ANXIETY AND DEPRESSED MOOD: ICD-10-CM

## 2018-09-11 DIAGNOSIS — Z23 ENCOUNTER FOR IMMUNIZATION: ICD-10-CM

## 2018-09-11 DIAGNOSIS — F32.A DEPRESSION, UNSPECIFIED DEPRESSION TYPE: Primary | ICD-10-CM

## 2018-09-11 RX ORDER — SERTRALINE HYDROCHLORIDE 50 MG/1
50 TABLET, FILM COATED ORAL DAILY
Qty: 90 TAB | Refills: 1
Start: 2018-09-11 | End: 2018-09-11 | Stop reason: SDUPTHER

## 2018-09-11 RX ORDER — SERTRALINE HYDROCHLORIDE 50 MG/1
50 TABLET, FILM COATED ORAL DAILY
Qty: 90 TAB | Refills: 1 | Status: SHIPPED | OUTPATIENT
Start: 2018-09-11 | End: 2018-09-26 | Stop reason: SDUPTHER

## 2018-09-11 NOTE — MR AVS SNAPSHOT
[address] [facility] [id number] [phone] Patient: Williams Poe MRN: [id number] RFR:[id number]/0983 Visit Information Date & Time Provider Department Dept. Phone Encounter #  
 9/11/2018 10:45 AM Kenji Lopez, 3 [facility] [phone] [id number] Follow-up Instructions Return in about 6 months (around 3/11/2019). Upcoming Health Maintenance Date Due  
 GLAUCOMA SCREENING Q2Y 1/1/2018 Influenza Age 5 to Adult 8/1/2018 MEDICARE YEARLY EXAM 11/22/2018 DTaP/Tdap/Td series (2 - Td) 9/1/2026 Allergies as of 9/11/2018  Review Complete On: 9/11/2018 By: Kenji Lopez MD  
  
 Severity Noted Reaction Type Reactions Other Medication High 07/21/2014    Anaphylaxis MRI Dye  
 Ciprofloxacin  08/20/2017    Anxiety Epinephrine  11/19/2010    Palpitations Ivp [Fd And C Blue No.1]  11/19/2010    Swelling Facial swelling many years ago, now only has oral contrast  
 Macrobid [Nitrofurantoin Monohyd/m-cryst]  09/01/2016    Other (comments) Whole body starts shaking Other Medication  11/19/2010    Palpitations Prednisone  01/28/2011    Nausea and Vomiting Current Immunizations  Reviewed on 2/16/2018 Name Date Influenza High Dose Vaccine PF 9/1/2016 12:05 PM, 9/15/2015 Influenza Vaccine 11/9/2017, 9/20/2014 Influenza Vaccine (Tri) Adjuvanted  Incomplete Influenza Vaccine PF 10/1/2016 12:00 AM  
 Influenza Vaccine Whole 9/1/2010 Pneumococcal Conjugate (PCV-13) 2/16/2018 ZZZ-RETIRED (DO NOT USE) Pneumococcal Vaccine (Unspecified Type) 1/1/2008 Not reviewed this visit You Were Diagnosed With   
  
 Codes Comments Depression, unspecified depression type    -  Primary ICD-10-CM: F32.9 ICD-9-CM: 319  Adjustment disorder with mixed anxiety and depressed mood     ICD-10-CM: F43.23 
ICD-9-CM: 309.28   
 Encounter for immunization     ICD-10-CM: C54 ICD-9-CM: V03.89 Vitals BP Pulse Temp Resp Height(growth percentile) Weight(growth percentile) 130/82 (BP 1 Location: Left arm, BP Patient Position: Sitting) 77 98.9 °F (37.2 °C) (Oral) 20 5' 3\" (1.6 m) 155 lb (70.3 kg) SpO2 BMI OB Status Smoking Status 97% 27.46 kg/m2 Postmenopausal Never Smoker Vitals History BMI and BSA Data Body Mass Index Body Surface Area  
 27.46 kg/m 2 1.77 m 2 Preferred Pharmacy Pharmacy Name Phone [facility] [facility] [phone] Your Updated Medication List  
  
   
This list is accurate as of 9/11/18 11:11 AM.  Always use your most recent med list.  
  
  
  
  
 24 HOUR ALLERGY RELIEF 50 mcg/actuation nasal spray Generic drug:  fluticasone 2 Sprays by Both Nostrils route daily. acetaminophen 650 mg Tber Commonly known as:  TYLENOL Take 650 mg by mouth every eight (8) hours. Arthritis  
  
 dicyclomine 10 mg capsule Commonly known as:  BENTYL TAKE ONE CAPSULE BY MOUTH THREE TIMES A DAY AS NEEDED  
  
 GAS RELIEF 125 mg capsule Generic drug:  simethicone Take 125 mg by mouth four (4) times daily as needed for Flatulence. sertraline 50 mg tablet Commonly known as:  ZOLOFT Take 1 Tab by mouth daily. Prescriptions Sent to Pharmacy Refills  
 sertraline (ZOLOFT) 50 mg tablet 1 Sig: Take 1 Tab by mouth daily. Class: Normal  
 [facility] #: [phone] Route: Oral  
  
We Performed the Following ADMIN INFLUENZA VIRUS VAC [ [facility]] INFLUENZA VACCINE INACTIVATED (IIV), SUBUNIT, ADJUVANTED, IM [id number] CPT(R)] Follow-up Instructions Return in about 6 months (around 3/11/2019). Introducing [facility]! Dear Karen Palacios: 
Thank you for requesting a [employer] account.   Our records indicate that you already have an active [employer] account. You can access your account anytime at [email or web address] Did you know that you can access your hospital and ER discharge instructions at any time in [employer]? You can also review all of your test results from your hospital stay or ER visit. Additional Information If you have questions, please visit the Frequently Asked Questions section of the [employer] website at [email or web address] Remember, [employer] is NOT to be used for urgent needs. For medical emergencies, dial 911. Now available from your iPhone and Android! Please provide this summary of care documentation to your next provider. Your primary care clinician is listed as Gabino Barnett. If you have any questions after today's visit, please call [phone].

## 2018-09-11 NOTE — PROGRESS NOTES
Immunization/s administered 9/11/2018 by Damion Shannon LPN with guardian's consent. Patient tolerated procedure well. No reactions noted. FLUAD left deltoid.

## 2018-09-11 NOTE — PROGRESS NOTES
Assessment/Plan:    1. Depression, unspecified depression type  -cont current regimen as she just restarted it a few days ago  - sertraline (ZOLOFT) 50 mg tablet; Take 1 Tab by mouth daily. Dispense: 90 Tab; Refill: 1    2. Adjustment disorder with mixed anxiety and depressed mood  -discussed this can take up to 6 mos to improve    3. Encounter for immunization  - Influenza Vaccine Inactivated (IIV)(FLUAD), Subunit, Adjuvanted, IM, ([id number])  - Administration fee () for Medicare insured patients    The plan was discussed with the patient. The patient verbalized understanding and is in agreement with the plan. All medication potential side effects were discussed with the patient. Health Maintenance:   Health Maintenance   Topic Date Due    GLAUCOMA SCREENING Q2Y  01/01/2018    Influenza Age 5 to Adult  08/01/2018    MEDICARE YEARLY EXAM  11/22/2018    DTaP/Tdap/Td series (2 - Td) 09/01/2026    Bone Densitometry (Dexa) Screening  Completed    ZOSTER VACCINE AGE 60>  Addressed    Pneumococcal 65+ Low/Medium Risk  Completed       Abigail Reyes is a 80 y.o. female and presents with Depression; Medication Evaluation; and Immunization/Injection (Flu shot)     Subjective:  Pt recently moved into assisted living/senior facility. Her daughters are helping finance this. She has ongoing depression. Is adjusting to this move and states she's been nervous and upset. She had been on lexapro, but didn't feel that was helping, so she switched back to zoloft a few days ago. She's been more tearful lately. ROS:  Constitutional: No recent weight change. No weakness/fatigue. No f/c. Cardiovascular: No CP/palpitations. No RAYA/orthopnea/PND. Respiratory: No cough/sputum, dyspnea, wheezing. Gastointestinal: No dysphagia, reflux. No n/v. No constipation/diarrhea. No melena/rectal bleeding. Genitourinary: No dysuria, urinary hesitancy, nocturia, hematuria. No incontinence.    Musculoskeletal: No joint pain/stiffness. No muscle pain/tenderness. Endo: No heat/cold intolerance, no polyuria/polydypsia. Psychiatric:  No depression, anxiety. The problem list was updated as a part of today's visit. Patient Active Problem List   Diagnosis Code    Depression F32.9    HLD (hyperlipidemia) E78.5    Osteopenia M85.80    Diverticulosis K57.90    Allergic rhinitis J30.9    Hearing loss of both ears H91.93    OA (osteoarthritis) M19.90    PND (post-nasal drip) R09.82    Anxiety F41.9    Nephrolithiasis N20.0    IBS (irritable bowel syndrome) K58.9    Cerebral microvascular disease I67.9    CKD (chronic kidney disease) stage 3, GFR 30-59 ml/min N18.3    DNR (do not resuscitate) Z73    ESTHER (obstructive sleep apnea) G47.33    Chronic right shoulder pain M25.511, G89.29    Transient cerebral ischemia G45.9       The PSH, FH were reviewed. SH:  Social History   Substance Use Topics    Smoking status: Never Smoker    Smokeless tobacco: Never Used    Alcohol use No       Medications/Allergies:    Current Outpatient Prescriptions:     sertraline (ZOLOFT) 50 mg tablet, Take 1 Tab by mouth daily. , Disp: 90 Tab, Rfl: 1    simethicone (GAS RELIEF) 125 mg capsule, Take 125 mg by mouth four (4) times daily as needed for Flatulence. , Disp: , Rfl:     acetaminophen (TYLENOL) 650 mg TbER, Take 650 mg by mouth every eight (8) hours. Arthritis, Disp: , Rfl:     fluticasone (24 HOUR ALLERGY RELIEF) 50 mcg/actuation nasal spray, 2 Sprays by Both Nostrils route daily. , Disp: , Rfl:     dicyclomine (BENTYL) 10 mg capsule, TAKE ONE CAPSULE BY MOUTH THREE TIMES A DAY AS NEEDED, Disp: 90 Cap, Rfl: 0       Allergies   Allergen Reactions    Other Medication Anaphylaxis     MRI Dye    Ciprofloxacin Anxiety    Epinephrine Palpitations    Ivp [Fd And C Blue No.1] Swelling     Facial swelling many years ago, now only has oral contrast    Macrobid [Nitrofurantoin Monohyd/M-Cryst] Other (comments)     Whole body starts shaking       Other Medication Palpitations    Prednisone Nausea and Vomiting       Objective:  Visit Vitals    /82 (BP 1 Location: Left arm, BP Patient Position: Sitting)    Pulse 77    Temp 98.9 °F (37.2 °C) (Oral)    Resp 20    Ht 5' 3\" (1.6 m)    Wt 155 lb (70.3 kg)    SpO2 97%    BMI 27.46 kg/m2      Constitutional: Well developed, nourished, no distress, alert   CV: S1, S2.  RRR. No murmurs/rubs. No thrills palpated. No carotid bruits. Intact distal pulses. No edema. No aortic bruits. Pulm: No abnormalities on inspection. Clear to auscultation bilaterally. No wheezing/rhonchi. Normal effort. Psych: Appropriate affect, judgement and insight. Short-term memory intact.

## 2018-09-26 ENCOUNTER — HOSPITAL ENCOUNTER (OUTPATIENT)
Dept: LAB | Age: 83
Discharge: HOME | End: 2018-09-26
Payer: MEDICARE

## 2018-09-26 ENCOUNTER — OFFICE VISIT (OUTPATIENT)
Dept: FAMILY MEDICINE CLINIC | Age: 83
End: 2018-09-26

## 2018-09-26 VITALS
TEMPERATURE: 98.5 F | RESPIRATION RATE: 20 BRPM | HEIGHT: 63 IN | BODY MASS INDEX: 27.46 KG/M2 | WEIGHT: 155 LBS | DIASTOLIC BLOOD PRESSURE: 70 MMHG | SYSTOLIC BLOOD PRESSURE: 120 MMHG | HEART RATE: 77 BPM | OXYGEN SATURATION: 96 %

## 2018-09-26 DIAGNOSIS — F32.A DEPRESSION, UNSPECIFIED DEPRESSION TYPE: ICD-10-CM

## 2018-09-26 DIAGNOSIS — R42 DIZZINESS: Primary | ICD-10-CM

## 2018-09-26 DIAGNOSIS — R42 DIZZINESS: ICD-10-CM

## 2018-09-26 LAB
ALBUMIN SERPL-MCNC: 3.8 G/DL (ref 3.4–5)
ALBUMIN/GLOB SERPL: 1.3 {RATIO} (ref 0.8–1.7)
ALP SERPL-CCNC: 91 U/L (ref 45–117)
ALT SERPL-CCNC: 24 U/L (ref 13–56)
ANION GAP SERPL CALC-SCNC: 6 MMOL/L (ref 3–18)
AST SERPL-CCNC: 17 U/L (ref 15–37)
BILIRUB SERPL-MCNC: 0.3 MG/DL (ref 0.2–1)
BUN SERPL-MCNC: 27 MG/DL (ref 7–18)
BUN/CREAT SERPL: 34 (ref 12–20)
CALCIUM SERPL-MCNC: 8.9 MG/DL (ref 8.5–10.1)
CHLORIDE SERPL-SCNC: 109 MMOL/L (ref 100–108)
CO2 SERPL-SCNC: 27 MMOL/L (ref 21–32)
CREAT SERPL-MCNC: 0.79 MG/DL (ref 0.6–1.3)
ERYTHROCYTE [DISTWIDTH] IN BLOOD BY AUTOMATED COUNT: 14.4 % (ref 11.6–14.5)
GLOBULIN SER CALC-MCNC: 3 G/DL (ref 2–4)
GLUCOSE SERPL-MCNC: 83 MG/DL (ref 74–99)
HCT VFR BLD AUTO: 39.9 % (ref 35–45)
HGB BLD-MCNC: 12.8 G/DL (ref 12–16)
MCH RBC QN AUTO: 31.1 PG (ref 24–34)
MCHC RBC AUTO-ENTMCNC: 32.1 G/DL (ref 31–37)
MCV RBC AUTO: 96.8 FL (ref 74–97)
PLATELET # BLD AUTO: 200 K/UL (ref 135–420)
PMV BLD AUTO: 12.5 FL (ref 9.2–11.8)
POTASSIUM SERPL-SCNC: 4.2 MMOL/L (ref 3.5–5.5)
PROT SERPL-MCNC: 6.8 G/DL (ref 6.4–8.2)
RBC # BLD AUTO: 4.12 M/UL (ref 4.2–5.3)
SODIUM SERPL-SCNC: 142 MMOL/L (ref 136–145)
WBC # BLD AUTO: 7 K/UL (ref 4.6–13.2)

## 2018-09-26 PROCEDURE — 36415 COLL VENOUS BLD VENIPUNCTURE: CPT | Performed by: INTERNAL MEDICINE

## 2018-09-26 PROCEDURE — 80053 COMPREHEN METABOLIC PANEL: CPT | Performed by: INTERNAL MEDICINE

## 2018-09-26 PROCEDURE — 85027 COMPLETE CBC AUTOMATED: CPT | Performed by: INTERNAL MEDICINE

## 2018-09-26 RX ORDER — SERTRALINE HYDROCHLORIDE 25 MG/1
25 TABLET, FILM COATED ORAL DAILY
Qty: 90 TAB | Refills: 0
Start: 2018-09-26 | End: 2018-11-02 | Stop reason: SDUPTHER

## 2018-09-26 NOTE — MR AVS SNAPSHOT
Sudeep Saxena 
 
 
 [address] [facility] [address] 
[phone] Patient: Heide Mills MRN: [id number] PDV:[id number] Visit Information Date & Time Provider Department Dept. Phone Encounter #  
 9/26/2018  3:00 PM Dolores Recio, 3 [facility] [phone] [id number] Follow-up Instructions Return in about 2 months (around 11/26/2018) for medicare wellness. Your Appointments 11/12/2018 10:15 AM  
Office Visit with Dolores Recio MD  
3 [facility]) Appt Note: [address] [facility] [address] [facility] [id number] [phone]  
  
   
 [address] Upcoming Health Maintenance Date Due Shingrix Vaccine Age 50> (1 of 2) 3/13/1981 GLAUCOMA SCREENING Q2Y 1/1/2018 MEDICARE YEARLY EXAM 11/22/2018 DTaP/Tdap/Td series (2 - Td) 9/1/2026 Allergies as of 9/26/2018  Review Complete On: 9/26/2018 By: Dolores Recio MD  
  
 Severity Noted Reaction Type Reactions Other Medication High 07/21/2014    Anaphylaxis MRI Dye  
 Ciprofloxacin  08/20/2017    Anxiety Epinephrine  11/19/2010    Palpitations Ivp [Fd And C Blue No.1]  11/19/2010    Swelling Facial swelling many years ago, now only has oral contrast  
 Macrobid [Nitrofurantoin Monohyd/m-cryst]  09/01/2016    Other (comments) Whole body starts shaking Other Medication  11/19/2010    Palpitations Prednisone  01/28/2011    Nausea and Vomiting Current Immunizations  Reviewed on 9/11/2018 Name Date Influenza High Dose Vaccine PF 9/1/2016 12:05 PM, 9/15/2015 Influenza Vaccine 11/9/2017, 9/20/2014 Influenza Vaccine (Tri) Adjuvanted 9/11/2018 11:20 AM  
 Influenza Vaccine PF 10/1/2016 12:00 AM  
 Influenza Vaccine Whole 9/1/2010 Pneumococcal Conjugate (PCV-13) 2/16/2018 ZZZ-RETIRED (DO NOT USE) Pneumococcal Vaccine (Unspecified Type) 1/1/2008 Not reviewed this visit You Were Diagnosed With   
  
 Codes Comments Dizziness    -  Primary ICD-10-CM: J31 ICD-9-CM: 780.4 Depression, unspecified depression type     ICD-10-CM: F32.9 ICD-9-CM: 046 Vitals BP Pulse Temp Resp Height(growth percentile) Weight(growth percentile) 120/70 (BP 1 Location: Left arm, BP Patient Position: Sitting) 77 98.5 °F (36.9 °C) (Oral) 20 5' 3\" (1.6 m) 155 lb (70.3 kg) SpO2 BMI OB Status Smoking Status 96% 27.46 kg/m2 Postmenopausal Never Smoker Vitals History BMI and BSA Data Body Mass Index Body Surface Area  
 27.46 kg/m 2 1.77 m 2 Preferred Pharmacy Pharmacy Name Phone [facility] [address] [phone] Your Updated Medication List  
  
   
This list is accurate as of 9/26/18  3:25 PM.  Always use your most recent med list.  
  
  
  
  
 24 HOUR ALLERGY RELIEF 50 mcg/actuation nasal spray Generic drug:  fluticasone 2 Sprays by Both Nostrils route daily. acetaminophen 650 mg Tber Commonly known as:  TYLENOL Take 650 mg by mouth every eight (8) hours. Arthritis  
  
 dicyclomine 10 mg capsule Commonly known as:  BENTYL TAKE ONE CAPSULE BY MOUTH THREE TIMES A DAY AS NEEDED  
  
 GAS RELIEF 125 mg capsule Generic drug:  simethicone Take 125 mg by mouth four (4) times daily as needed for Flatulence. sertraline 25 mg tablet Commonly known as:  ZOLOFT Take 1 Tab by mouth daily. We Performed the Following AMB POC EKG ROUTINE W/ 12 LEADS, INTER & REP [03075 CPT(R)] Follow-up Instructions Return in about 2 months (around 11/26/2018) for medicare wellness. To-Do List   
 09/26/2018 Lab:  CBC W/O DIFF   
  
 09/26/2018 Lab:  METABOLIC PANEL, COMPREHENSIVE [facility] [place] [facility] Dear Deb Morin: Thank you for requesting a [employer] account. Our records indicate that you already have an active [employer] account. You can access your account anytime at [email or web address] Did you know that you can access your hospital and ER discharge instructions at any time in [employer]? You can also review all of your test results from your hospital stay or ER visit. Additional Information If you have questions, please visit the Frequently Asked Questions section of the [employer] website at [email or web address] Remember, [employer] is NOT to be used for urgent needs. For medical emergencies, dial 911. Now available from your iPhone and Android! Please provide this summary of care documentation to your next provider. Your primary care clinician is listed as Gabino Barnett. If you have any questions after today's visit, please call [phone].

## 2018-09-26 NOTE — PROGRESS NOTES
Assessment/Plan:    1. Dizziness  -ekg ok. Get labs. Cut dose zoloft to 25mg. Monitor.   - METABOLIC PANEL, COMPREHENSIVE; Future  - CBC W/O DIFF; Future  - AMB POC EKG ROUTINE W/ 12 LEADS, INTER & REP    The plan was discussed with the patient. The patient verbalized understanding and is in agreement with the plan. All medication potential side effects were discussed with the patient. Health Maintenance:   Health Maintenance   Topic Date Due    Shingrix Vaccine Age 49> (1 of 2) 03/13/1981    GLAUCOMA SCREENING Q2Y  01/01/2018    MEDICARE YEARLY EXAM  11/22/2018    DTaP/Tdap/Td series (2 - Td) 09/01/2026    Bone Densitometry (Dexa) Screening  Completed    Pneumococcal 65+ Low/Medium Risk  Completed    Influenza Age 5 to Adult  Completed       Carmel Cat is a 80 y.o. female and presents with Dizziness     Subjective:  Pt c/o being dizzy x several weeks. Denies room spinning. Position doesn't change her sx. Laying down makes it better. No palpitations. She wonders if it's related to taking zoloft daily (was only taking it occasionally previously)- she started doing this one month ago. She states she has a lot of stress and thinks that is contributing. ROS:  Constitutional: No recent weight change. No weakness/+fatigue. No f/c. HENT: No HA, +dizziness. No hearing loss/tinnitus. No nasal congestion/discharge. Eyes: No change in vision, double/blurred vision or eye pain/redness. Cardiovascular: No CP/palpitations. No RAYA/orthopnea/PND. Respiratory: No cough/sputum, dyspnea, wheezing. Neurological: No seizures/numbness/weakness. No paresthesias. The problem list was updated as a part of today's visit.   Patient Active Problem List   Diagnosis Code    Depression F32.9    HLD (hyperlipidemia) E78.5    Osteopenia M85.80    Diverticulosis K57.90    Allergic rhinitis J30.9    Hearing loss of both ears H91.93    OA (osteoarthritis) M19.90    PND (post-nasal drip) R09.82    Anxiety F41.9    Nephrolithiasis N20.0    IBS (irritable bowel syndrome) K58.9    Cerebral microvascular disease I67.9    CKD (chronic kidney disease) stage 3, GFR 30-59 ml/min N18.3    DNR (do not resuscitate) Z66    ESTHER (obstructive sleep apnea) G47.33    Chronic right shoulder pain M25.511, G89.29    Transient cerebral ischemia G45.9       The PSH, FH were reviewed. SH:  Social History   Substance Use Topics    Smoking status: Never Smoker    Smokeless tobacco: Never Used    Alcohol use No       Medications/Allergies:  Current Outpatient Prescriptions on File Prior to Visit   Medication Sig Dispense Refill    sertraline (ZOLOFT) 50 mg tablet Take 1 Tab by mouth daily. 90 Tab 1    simethicone (GAS RELIEF) 125 mg capsule Take 125 mg by mouth four (4) times daily as needed for Flatulence.  acetaminophen (TYLENOL) 650 mg TbER Take 650 mg by mouth every eight (8) hours. Arthritis      fluticasone (24 HOUR ALLERGY RELIEF) 50 mcg/actuation nasal spray 2 Sprays by Both Nostrils route daily.  dicyclomine (BENTYL) 10 mg capsule TAKE ONE CAPSULE BY MOUTH THREE TIMES A DAY AS NEEDED 90 Cap 0     No current facility-administered medications on file prior to visit. Allergies   Allergen Reactions    Other Medication Anaphylaxis     MRI Dye    Ciprofloxacin Anxiety    Epinephrine Palpitations    Ivp [Fd And C Blue No.1] Swelling     Facial swelling many years ago, now only has oral contrast    Macrobid [Nitrofurantoin Monohyd/M-Cryst] Other (comments)     Whole body starts shaking       Other Medication Palpitations    Prednisone Nausea and Vomiting       Objective:  Visit Vitals    /70 (BP 1 Location: Left arm, BP Patient Position: Sitting)    Pulse 77    Temp 98.5 °F (36.9 °C) (Oral)    Resp 20    Ht 5' 3\" (1.6 m)    Wt 155 lb (70.3 kg)    SpO2 96%    BMI 27.46 kg/m2      Constitutional: Well developed, nourished, no distress, alert   CV: S1, S2.  RRR. No murmurs/rubs. No thrills palpated. No carotid bruits. Intact distal pulses. No edema. No aortic bruits. Pulm: No abnormalities on inspection. Clear to auscultation bilaterally. No wheezing/rhonchi. Normal effort.      EKG: NSR, no ST changes

## 2018-09-26 NOTE — PROGRESS NOTES
Carlin Redding is a 80 y.o. female (: 3/13/1931) presenting to address:    Chief Complaint   Patient presents with    Dizziness       Vitals:    18 1457   BP: 120/70   Pulse: 77   Resp: 20   Temp: 98.5 °F (36.9 °C)   TempSrc: Oral   SpO2: 96%   Weight: 155 lb (70.3 kg)   Height: 5' 3\" (1.6 m)   PainSc:   0 - No pain       Learning Assessment:     Learning Assessment 2015   PRIMARY LEARNER Patient   HIGHEST LEVEL OF EDUCATION - PRIMARY LEARNER  SOME COLLEGE   BARRIERS PRIMARY LEARNER HEARING   CO-LEARNER CAREGIVER No   PRIMARY LANGUAGE ENGLISH    NEED -   LEARNER PREFERENCE PRIMARY READING   LEARNING SPECIAL TOPICS -   ANSWERED BY self   RELATIONSHIP SELF     Depression Screening:     PHQ over the last two weeks 2018   PHQ Not Done Active Diagnosis of Depression or Bipolar Disorder     Fall Risk Assessment:     Fall Risk Assessment, last 12 mths 7/3/2018   Able to walk? Yes   Fall in past 12 months? Yes   Fall with injury? No   Number of falls in past 12 months 4   Fall Risk Score 4     Abuse Screening:     Abuse Screening Questionnaire 2017   Do you ever feel afraid of your partner? N   Are you in a relationship with someone who physically or mentally threatens you? N   Is it safe for you to go home? Y     Coordination of Care Questionaire:   1. Have you been to the ER, urgent care clinic since your last visit? Hospitalized since your last visit? NO    2. Have you seen or consulted any other health care providers outside of the [address] [place] [address] since your last visit? Include any pap smears or colon screening. NO    Advanced Directive:   1. Do you have an Advanced Directive? YES    2. Would you like information on Advanced Directives?  NO

## 2018-09-27 NOTE — PROGRESS NOTES
Tell pt her labs show she is dehydrated. She needs to drink lots of water this next week.   This should help with her dizziness

## 2018-10-22 ENCOUNTER — OFFICE VISIT (OUTPATIENT)
Dept: FAMILY MEDICINE CLINIC | Age: 83
End: 2018-10-22

## 2018-10-22 VITALS
BODY MASS INDEX: 27.5 KG/M2 | WEIGHT: 155.2 LBS | RESPIRATION RATE: 18 BRPM | SYSTOLIC BLOOD PRESSURE: 136 MMHG | HEIGHT: 63 IN | OXYGEN SATURATION: 98 % | TEMPERATURE: 97.7 F | HEART RATE: 62 BPM | DIASTOLIC BLOOD PRESSURE: 72 MMHG

## 2018-10-22 DIAGNOSIS — M79.18 PIRIFORMIS MUSCLE PAIN: ICD-10-CM

## 2018-10-22 DIAGNOSIS — M53.3 SACRAL PAIN: Primary | ICD-10-CM

## 2018-10-22 RX ORDER — IBUPROFEN 200 MG
200 TABLET ORAL
COMMUNITY
End: 2019-11-20 | Stop reason: ALTCHOICE

## 2018-10-22 NOTE — PROGRESS NOTES
Assessment/Plan: 1. Sacral pain and  Piriformis muscle pain 
-home exercises. nsaids x 1 week. Heat. If not improved in a few weeks, RTO The plan was discussed with the patient. The patient verbalized understanding and is in agreement with the plan. All medication potential side effects were discussed with the patient. Health Maintenance:  
Health Maintenance Topic Date Due  Shingrix Vaccine Age 50> (1 of 2) 03/13/1981  GLAUCOMA SCREENING Q2Y  01/01/2018  MEDICARE YEARLY EXAM  11/22/2018  DTaP/Tdap/Td series (2 - Td) 09/01/2026  Bone Densitometry (Dexa) Screening  Completed  Pneumococcal 65+ Low/Medium Risk  Completed  Influenza Age 5 to Adult  Completed Valeriy Gracia is a 80 y.o. female and presents with Hip Pain (left hip x 3-4 weeks ) Subjective: 
Pt c/o L buttock pain, worse with walking. No pain at rest.  Has been ongoing x 4 weeks. Pain doesn't radiate. Didn't try any otc remedies. ROS: 
Constitutional: No recent weight change. No weakness/fatigue. No f/c. Cardiovascular: No CP/palpitations. No RAYA/orthopnea/PND. Respiratory: No cough/sputum, dyspnea, wheezing. Gastointestinal: No dysphagia, reflux. No n/v. No constipation/diarrhea. No melena/rectal bleeding. Genitourinary: No dysuria, urinary hesitancy, nocturia, hematuria. No incontinence. Musculoskeletal: No joint pain/stiffness. No muscle pain/tenderness. The problem list was updated as a part of today's visit. Patient Active Problem List  
Diagnosis Code  Depression F32.9  
 HLD (hyperlipidemia) E78.5  Osteopenia M85.80  Diverticulosis K57.90  Allergic rhinitis J30.9  Hearing loss of both ears H91.93  
 OA (osteoarthritis) M19.90  PND (post-nasal drip) R09.82  
 Anxiety F41.9  Nephrolithiasis N20.0  IBS (irritable bowel syndrome) K58.9  Cerebral microvascular disease I67.9  CKD (chronic kidney disease) stage 3, GFR 30-59 ml/min ([facility]) N18.3  DNR (do not resuscitate) Z66  
 ESTHER (obstructive sleep apnea) G47.33  
 Chronic right shoulder pain M25.511, G89.29  
 Transient cerebral ischemia G45.9 The PSH, FH were reviewed. SH: Social History Tobacco Use  Smoking status: Never Smoker  Smokeless tobacco: Never Used Substance Use Topics  Alcohol use: No  
 Drug use: No  
 
 
Medications/Allergies: 
Current Outpatient Medications on File Prior to Visit Medication Sig Dispense Refill  ibuprofen (MOTRIN) 200 mg tablet Take 200 mg by mouth every eight (8) hours as needed for Pain.  sertraline (ZOLOFT) 25 mg tablet Take 1 Tab by mouth daily. 90 Tab 0  
 simethicone (GAS RELIEF) 125 mg capsule Take 125 mg by mouth four (4) times daily as needed for Flatulence.  acetaminophen (TYLENOL) 650 mg TbER Take 650 mg by mouth every eight (8) hours. Arthritis  fluticasone (24 HOUR ALLERGY RELIEF) 50 mcg/actuation nasal spray 2 Sprays by Both Nostrils route daily.  dicyclomine (BENTYL) 10 mg capsule TAKE ONE CAPSULE BY MOUTH THREE TIMES A DAY AS NEEDED 90 Cap 0 No current facility-administered medications on file prior to visit. Allergies Allergen Reactions  Other Medication Anaphylaxis MRI Dye  
 Ciprofloxacin Anxiety  Epinephrine Palpitations  Ivp [Fd And C Blue No.1] Swelling Facial swelling many years ago, now only has oral contrast  
 Macrobid [Nitrofurantoin Monohyd/M-Cryst] Other (comments) Whole body starts shaking  Other Medication Palpitations  Prednisone Nausea and Vomiting Objective: 
Visit Vitals /72 (BP 1 Location: Left arm, BP Patient Position: Sitting) Pulse 62 Temp 97.7 °F (36.5 °C) (Oral) Resp 18 Ht 5' 3\" (1.6 m) Wt 155 lb 3.2 oz (70.4 kg) SpO2 98% BMI 27.49 kg/m² Constitutional: Well developed, nourished, no distress, alert CV: S1, S2.  RRR. No murmurs/rubs. No thrills palpated.   No carotid bruits. Intact distal pulses. No edema. No aortic bruits. Pulm: No abnormalities on inspection. Clear to auscultation bilaterally. No wheezing/rhonchi. Normal effort. MS: Gait normal.  +pain with external rotation of L hip. No trochanteric bursa pain. +pain over L sacrum. Labwork and Ancillary Studies: CBC w/Diff Lab Results Component Value Date/Time WBC 7.0 09/26/2018 03:49 PM  
 HGB 12.8 09/26/2018 03:49 PM  
 PLATELET 276 75/88/1503 03:49 PM  
  
 
 Basic Metabolic Profile/LFTs Lab Results Component Value Date/Time Sodium 142 09/26/2018 03:49 PM  
 Potassium 4.2 09/26/2018 03:49 PM  
 Chloride 109 (H) 09/26/2018 03:49 PM  
 CO2 27 09/26/2018 03:49 PM  
 Anion gap 6 09/26/2018 03:49 PM  
 Glucose 83 09/26/2018 03:49 PM  
 BUN 27 (H) 09/26/2018 03:49 PM  
 Creatinine 0.79 09/26/2018 03:49 PM  
 BUN/Creatinine ratio 34 (H) 09/26/2018 03:49 PM  
 GFR est AA >60 09/26/2018 03:49 PM  
 GFR est non-AA >60 09/26/2018 03:49 PM  
 Calcium 8.9 09/26/2018 03:49 PM  
  
Lab Results Component Value Date/Time ALT (SGPT) 24 09/26/2018 03:49 PM  
 AST (SGOT) 17 09/26/2018 03:49 PM  
 Alk. phosphatase 91 09/26/2018 03:49 PM  
 Bilirubin, total 0.3 09/26/2018 03:49 PM  
 
 
Cholesterol Lab Results Component Value Date/Time  Cholesterol, total 199 11/26/2016 09:17 PM  
 HDL Cholesterol 47 11/26/2016 09:17 PM  
 LDL, calculated 125.8 (H) 11/26/2016 09:17 PM  
 Triglyceride 131 11/26/2016 09:17 PM  
 CHOL/HDL Ratio 4.2 11/26/2016 09:17 PM

## 2018-10-22 NOTE — PATIENT INSTRUCTIONS
Piriformis Syndrome: Exercises Your Care Instructions Here are some examples of typical rehabilitation exercises for your condition. Start each exercise slowly. Ease off the exercise if you start to have pain. Your doctor or physical therapist will tell you when you can start these exercises and which ones will work best for you. How to do the exercises Hip rotator stretch 1. Lie on your back with both knees bent and your feet flat on the floor. 2. Put the ankle of your affected leg on your opposite thigh near your knee. 3. Use your hand to gently push your knee (on your affected leg) away from your body until you feel a gentle stretch around your hip. 4. Hold the stretch for 15 to 30 seconds. 5. Repeat 2 to 4 times. 6. Switch legs and repeat steps 1 through 5. Piriformis stretch 1. Lie on your back with your legs straight. 2. Lift your affected leg and bend your knee. With your opposite hand, reach across your body, and then gently pull your knee toward your opposite shoulder. 3. Hold the stretch for 15 to 30 seconds. 4. Repeat with your other leg. 5. Repeat 2 to 4 times on each side. Lower abdominal strengthening 1. Lie on your back with your knees bent and your feet flat on the floor. 2. Tighten your belly muscles by pulling your belly button in toward your spine. 3. Lift one foot off the floor and bring your knee toward your chest, so that your knee is straight above your hip and your leg is bent like the letter \"L. \" 
4. Lift the other knee up to the same position. 5. Lower one leg at a time to the starting position. 6. Keep alternating legs until you have lifted each leg 8 to 12 times. 7. Be sure to keep your belly muscles tight and your back still as you are moving your legs. Be sure to breathe normally. Follow-up care is a key part of your treatment and safety.  Be sure to make and go to all appointments, and call your doctor if you are having problems. It's also a good idea to know your test results and keep a list of the medicines you take. Where can you learn more? Go to http://ward-vin.info/. Enter W805 in the search box to learn more about \"Piriformis Syndrome: Exercises. \" Current as of: November 29, 2017 Content Version: 11.8 © 2793-5905 [employer]. Care instructions adapted under license by "[employer] (which disclaims liability or warranty for this information). If you have questions about a medical condition or this instruction, always ask your healthcare professional. [place] [address] any warranty or liability for your use of this information.

## 2018-10-22 NOTE — PROGRESS NOTES
Derek Moss is a 80 y.o. female (: 3/13/1931) presenting to address: Chief Complaint Patient presents with  
 Hip Pain  
  left hip x 3-4 weeks Vitals:  
 10/22/18 1141 BP: 136/72 Pulse: 62 Resp: 18 Temp: 97.7 °F (36.5 °C) TempSrc: Oral  
SpO2: 98% Weight: 155 lb 3.2 oz (70.4 kg) Height: 5' 3\" (1.6 m) PainSc:   6 PainLoc: Hip Hearing/Vision: No exam data present Learning Assessment:  
 
Learning Assessment 2015 PRIMARY LEARNER Patient HIGHEST LEVEL OF EDUCATION - PRIMARY LEARNER  SOME COLLEGE  
BARRIERS PRIMARY LEARNER HEARING  
CO-LEARNER CAREGIVER No  
PRIMARY LANGUAGE ENGLISH  NEED -  
LEARNER PREFERENCE PRIMARY READING  
LEARNING SPECIAL TOPICS -  
ANSWERED BY self RELATIONSHIP SELF Depression Screening: PHQ over the last two weeks 10/22/2018 PHQ Not Done Active Diagnosis of Depression or Bipolar Disorder Little interest or pleasure in doing things Not at all Feeling down, depressed, irritable, or hopeless Several days Total Score PHQ 2 1 Fall Risk Assessment:  
 
Fall Risk Assessment, last 12 mths 10/22/2018 Able to walk? Yes Fall in past 12 months? No  
Fall with injury? -  
Number of falls in past 12 months - Fall Risk Score -  
 
Abuse Screening:  
 
Abuse Screening Questionnaire 2017 Do you ever feel afraid of your partner? Patience Mandril Are you in a relationship with someone who physically or mentally threatens you? Patience Mandril Is it safe for you to go home? Tabitha Spaulding Coordination of Care Questionaire: 1. Have you been to the ER, urgent care clinic since your last visit? Hospitalized since your last visit? NO 
 
2. Have you seen or consulted any other health care providers outside of the [address] [place] [address] since your last visit? Include any pap smears or colon screening. NO Advanced Directive: 1. Do you have an Advanced Directive? YES ON FILE 
 
2. Would you like information on Advanced Directives?  NO

## 2018-11-02 ENCOUNTER — OFFICE VISIT (OUTPATIENT)
Dept: FAMILY MEDICINE CLINIC | Age: 83
End: 2018-11-02

## 2018-11-02 VITALS
BODY MASS INDEX: 27.21 KG/M2 | RESPIRATION RATE: 18 BRPM | HEART RATE: 72 BPM | TEMPERATURE: 97.6 F | HEIGHT: 63 IN | OXYGEN SATURATION: 96 % | WEIGHT: 153.6 LBS

## 2018-11-02 DIAGNOSIS — M25.552 ISCHIAL PAIN, LEFT: ICD-10-CM

## 2018-11-02 DIAGNOSIS — F32.1 MODERATE SINGLE CURRENT EPISODE OF MAJOR DEPRESSIVE DISORDER (HCC): ICD-10-CM

## 2018-11-02 DIAGNOSIS — Z65.8 PSYCHOSOCIAL STRESSORS: Primary | ICD-10-CM

## 2018-11-02 RX ORDER — SERTRALINE HYDROCHLORIDE 50 MG/1
50 TABLET, FILM COATED ORAL DAILY
Qty: 90 TAB | Refills: 0 | Status: SHIPPED | OUTPATIENT
Start: 2018-11-02 | End: 2019-02-15

## 2018-11-02 NOTE — PROGRESS NOTES
Airam Zhou is a 80 y.o. female (: 3/13/1931) presenting to address: Chief Complaint Patient presents with  
 Hip Pain Sacral  
 
 
Vitals:  
 18 1356 Pulse: 72 Resp: 18 Temp: 97.6 °F (36.4 °C) TempSrc: Oral  
SpO2: 96% Weight: 153 lb 9.6 oz (69.7 kg) Height: 5' 3\" (1.6 m) Learning Assessment:  
 
Learning Assessment 2015 PRIMARY LEARNER Patient HIGHEST LEVEL OF EDUCATION - PRIMARY LEARNER  SOME COLLEGE  
BARRIERS PRIMARY LEARNER HEARING  
CO-LEARNER CAREGIVER No  
PRIMARY LANGUAGE ENGLISH  NEED -  
LEARNER PREFERENCE PRIMARY READING  
LEARNING SPECIAL TOPICS -  
ANSWERED BY self RELATIONSHIP SELF Depression Screening: PHQ over the last two weeks 10/22/2018 PHQ Not Done Active Diagnosis of Depression or Bipolar Disorder Little interest or pleasure in doing things Not at all Feeling down, depressed, irritable, or hopeless Several days Total Score PHQ 2 1 Fall Risk Assessment:  
 
Fall Risk Assessment, last 12 mths 10/22/2018 Able to walk? Yes Fall in past 12 months? No  
Fall with injury? -  
Number of falls in past 12 months - Fall Risk Score -  
 
Abuse Screening:  
 
Abuse Screening Questionnaire 2017 Do you ever feel afraid of your partner? Rushie Crank Are you in a relationship with someone who physically or mentally threatens you? Rushie Crank Is it safe for you to go home? [facility] Coordination of Care Questionaire: 1. Have you been to the ER, urgent care clinic since your last visit? Hospitalized since your last visit? NO 
 
2. Have you seen or consulted any other health care providers outside of the [address] [place] [address] since your last visit? Include any pap smears or colon screening. NO Advanced Directive: 1. Do you have an Advanced Directive? YES 
 
2. Would you like information on Advanced Directives?  NO

## 2018-11-02 NOTE — PROGRESS NOTES
Assessment/Plan: 1. Psychosocial stressors 2. Moderate single current episode of major depressive disorder (HCC) 
-increase to 50mg. - sertraline (ZOLOFT) 50 mg tablet; Take 1 Tab by mouth daily. Dispense: 90 Tab; Refill: 0 
 
3. Ischial pain, left 
-make appt with ortho if not better in 2-3 weeks. Can't afford PT The plan was discussed with the patient. The patient verbalized understanding and is in agreement with the plan. All medication potential side effects were discussed with the patient. Health Maintenance:  
Health Maintenance Topic Date Due  Shingrix Vaccine Age 50> (1 of 2) 03/13/1981  GLAUCOMA SCREENING Q2Y  01/01/2018  MEDICARE YEARLY EXAM  11/22/2018  DTaP/Tdap/Td series (2 - Td) 09/01/2026  Bone Densitometry (Dexa) Screening  Completed  Pneumococcal 65+ Low/Medium Risk  Completed  Influenza Age 5 to Adult  Completed John Benson is a 80 y.o. female and presents with Hip Pain (Sacral) Subjective: 
Pt seen last week for piriformis syndrome. Was treated conservatively with nsaids, home exercises. Pt c/o L buttock pain, worse with walking. No pain at rest.   Pain doesn't radiate.   she hasn't had any improvement in her pain level. She has significant financial stressors. Is trying to sell her house (that she had reverse mortgage on). She states she's so depressed. Her daughter isn't speaking to her. Her son also isn't speaking to her. She doesn't like where she's living (in [facility]). She continues to look for a job. She's seeing a counselor. She has failed zoloft, celexa, lexapro, effexor, wellbutrin. No SI.   
 
ROS: 
Constitutional: No recent weight change. No weakness/fatigue. No f/c. Cardiovascular: No CP/palpitations. No RAYA/orthopnea/PND. Respiratory: No cough/sputum, dyspnea, wheezing. Gastointestinal: No dysphagia, reflux. No n/v. No constipation/diarrhea. No melena/rectal bleeding. Musculoskeletal: No joint pain/stiffness. No muscle pain/tenderness. The problem list was updated as a part of today's visit. Patient Active Problem List  
Diagnosis Code  Depression F32.9  
 HLD (hyperlipidemia) E78.5  Osteopenia M85.80  Diverticulosis K57.90  Allergic rhinitis J30.9  Hearing loss of both ears H91.93  
 OA (osteoarthritis) M19.90  PND (post-nasal drip) R09.82  
 Anxiety F41.9  Nephrolithiasis N20.0  IBS (irritable bowel syndrome) K58.9  Cerebral microvascular disease I67.9  CKD (chronic kidney disease) stage 3, GFR 30-59 ml/min ([facility]) N18.3  DNR (do not resuscitate) Z66  
 ESTHER (obstructive sleep apnea) G47.33  
 Chronic right shoulder pain M25.511, G89.29  
 Transient cerebral ischemia G45.9 The PSH, FH were reviewed. SH: Social History Tobacco Use  Smoking status: Never Smoker  Smokeless tobacco: Never Used Substance Use Topics  Alcohol use: No  
 Drug use: No  
 
 
Medications/Allergies: 
Current Outpatient Medications on File Prior to Visit Medication Sig Dispense Refill  ibuprofen (MOTRIN) 200 mg tablet Take 200 mg by mouth every eight (8) hours as needed for Pain.  sertraline (ZOLOFT) 25 mg tablet Take 1 Tab by mouth daily. 90 Tab 0  
 simethicone (GAS RELIEF) 125 mg capsule Take 125 mg by mouth four (4) times daily as needed for Flatulence.  acetaminophen (TYLENOL) 650 mg TbER Take 650 mg by mouth every eight (8) hours. Arthritis  fluticasone (24 HOUR ALLERGY RELIEF) 50 mcg/actuation nasal spray 2 Sprays by Both Nostrils route daily.  dicyclomine (BENTYL) 10 mg capsule TAKE ONE CAPSULE BY MOUTH THREE TIMES A DAY AS NEEDED 90 Cap 0 No current facility-administered medications on file prior to visit. Allergies Allergen Reactions  Other Medication Anaphylaxis MRI Dye  
 Ciprofloxacin Anxiety  Epinephrine Palpitations  Ivp [Fd And C Blue No.1] Swelling Facial swelling many years ago, now only has oral contrast  
 Macrobid [Nitrofurantoin Monohyd/M-Cryst] Other (comments) Whole body starts shaking  Other Medication Palpitations  Prednisone Nausea and Vomiting Objective: 
Visit Vitals Pulse 72 Temp 97.6 °F (36.4 °C) (Oral) Resp 18 Ht 5' 3\" (1.6 m) Wt 153 lb 9.6 oz (69.7 kg) SpO2 96% BMI 27.21 kg/m² Constitutional: Well developed, nourished, no distress, alert CV: S1, S2.  RRR. No murmurs/rubs. No thrills palpated. No carotid bruits. Intact distal pulses. No edema. No aortic bruits. Pulm: No abnormalities on inspection. Clear to auscultation bilaterally. No wheezing/rhonchi. Normal effort. MS: Gait normal.  Normal ROM of hip. +pain over L ischium. No trochanteric bursa tenderness. +kyphosis

## 2018-11-02 NOTE — PATIENT INSTRUCTIONS
Depression and Chronic Disease: Care Instructions Your Care Instructions A chronic disease is one that you have for a long time. Some chronic diseases can be controlled, but they usually cannot be cured. Depression is common in people with chronic diseases, but it often goes unnoticed. Many people have concerns about seeking treatment for a mental health problem. You may think it's a sign of weakness, or you don't want people to know about it. It's important to overcome these reasons for not seeking treatment. Treating depression or anxiety is good for your health. Follow-up care is a key part of your treatment and safety. Be sure to make and go to all appointments, and call your doctor if you are having problems. It's also a good idea to know your test results and keep a list of the medicines you take. How can you care for yourself at home? Watch for symptoms of depression The symptoms of depression are often subtle at first. You may think they are caused by your disease rather than depression. Or you may think it is normal to be depressed when you have a chronic disease. If you are depressed you may: · Feel sad or hopeless. · Feel guilty or worthless. · Not enjoy the things you used to enjoy. · Feel hopeless, as though life is not worth living. · Have trouble thinking or remembering. · Have low energy, and you may not eat or sleep well. · Pull away from others. · Think often about death or killing yourself. (Keep the numbers for these national suicide hotlines: 2-140-165-TALK [1-344.179.9697] and 5-095-VUFQENZ [1-455.755.8084]. ) Get treatment By treating your depression, you can feel more hopeful and have more energy. If you feel better, you may take better care of yourself, so your health may improve. · Talk to your doctor if you have any changes in mood during treatment for your disease. · Ask your doctor for help.  Counseling, antidepressant medicine, or a combination of the two can help most people with depression. Often a combination works best. Counseling can also help you cope with having a chronic disease. When should you call for help? Call 911 anytime you think you may need emergency care. For example, call if: 
  · You feel like hurting yourself or someone else.  
  · Someone you know has depression and is about to attempt or is attempting suicide.  
[facility] your doctor now or seek immediate medical care if: 
  · You hear voices.  
  · Someone you know has depression and: 
? Starts to give away his or her possessions. ? Uses illegal drugs or drinks alcohol heavily. ? Talks or writes about death, including writing suicide notes or talking about guns, knives, or pills. ? Starts to spend a lot of time alone. ? Acts very aggressively or suddenly appears calm.  
 Watch closely for changes in your health, and be sure to contact your doctor if: 
  · You do not get better as expected. Where can you learn more? Go to http://ward-vin.info/. Enter K918 in the search box to learn more about \"Depression and Chronic Disease: Care Instructions. \" Current as of: December 7, 2017 Content Version: 11.8 © 0013-0799 Healthwise, [employer]. Care instructions adapted under license by [employer] (which disclaims liability or warranty for this information). If you have questions about a medical condition or this instruction, always ask your healthcare professional. [place] [address] any warranty or liability for your use of this information. You have been referred to orthopedic surgery. Please call one of the preferred providers listed below and schedule your appointment. Once you have scheduled your appointment, please call the office at [phone] and leave the details of your appointment (provider you will be seeing, appointment date and time) on the voice mail. [facility] [phone] [address] [facility] , [address], [address] [place] [address], [address] [place] [address] Knee/Hip: Dr. Keyla Botello, Dr. Chelle Hutchinson

## 2018-11-07 ENCOUNTER — TELEPHONE (OUTPATIENT)
Dept: FAMILY MEDICINE CLINIC | Age: 83
End: 2018-11-07

## 2018-11-07 NOTE — TELEPHONE ENCOUNTER
Pt called about a referral that was to be placed in the system in regards to patient hip pain. Pt states she has yet received a call and I was unable to locate that referral in the system, please advise.

## 2018-11-07 NOTE — TELEPHONE ENCOUNTER
Returned pt call. No formal referral written. Pt was to call ortho in 2-3 weeks if pain not better. Pt wanted contact numbers. I gave her [facility] and [place].

## 2018-11-12 ENCOUNTER — OFFICE VISIT (OUTPATIENT)
Dept: FAMILY MEDICINE CLINIC | Age: 83
End: 2018-11-12

## 2018-11-12 VITALS
OXYGEN SATURATION: 96 % | TEMPERATURE: 97.8 F | SYSTOLIC BLOOD PRESSURE: 132 MMHG | DIASTOLIC BLOOD PRESSURE: 70 MMHG | BODY MASS INDEX: 27.36 KG/M2 | WEIGHT: 154.4 LBS | RESPIRATION RATE: 18 BRPM | HEIGHT: 63 IN | HEART RATE: 65 BPM

## 2018-11-12 DIAGNOSIS — Z71.89 DNR (DO NOT RESUSCITATE) DISCUSSION: ICD-10-CM

## 2018-11-12 DIAGNOSIS — Z00.00 MEDICARE ANNUAL WELLNESS VISIT, SUBSEQUENT: Primary | ICD-10-CM

## 2018-11-12 DIAGNOSIS — M25.552 ISCHIAL PAIN, LEFT: ICD-10-CM

## 2018-11-12 NOTE — PROGRESS NOTES
Liza Trinidad is a 80 y.o. female (: 3/13/1931) presenting to address: Chief Complaint Patient presents with [address] [facility] Annual Wellness Visit Vitals:  
 18 1010 BP: 132/70 Pulse: 65 Resp: 18 Temp: 97.8 °F (36.6 °C) TempSrc: Oral  
SpO2: 96% Weight: 154 lb 6.4 oz (70 kg) Height: 5' 3\" (1.6 m) PainSc:   0 - No pain Hearing/Vision:  
 
 Visual Acuity Screening Right eye Left eye Both eyes Without correction:     
With correction: 20/25 20/30 20/25 Learning Assessment:  
 
Learning Assessment 2015 PRIMARY LEARNER Patient HIGHEST LEVEL OF EDUCATION - PRIMARY LEARNER  SOME COLLEGE  
BARRIERS PRIMARY LEARNER HEARING  
CO-LEARNER CAREGIVER No  
PRIMARY LANGUAGE ENGLISH  NEED -  
LEARNER PREFERENCE PRIMARY READING  
LEARNING SPECIAL TOPICS -  
ANSWERED BY self RELATIONSHIP SELF Depression Screening: PHQ over the last two weeks 2018 PHQ Not Done (No Data) Little interest or pleasure in doing things - Feeling down, depressed, irritable, or hopeless - Total Score PHQ 2 - Fall Risk Assessment:  
 
Fall Risk Assessment, last 12 mths 10/22/2018 Able to walk? Yes Fall in past 12 months? No  
Fall with injury? -  
Number of falls in past 12 months - Fall Risk Score -  
 
Abuse Screening:  
 
Abuse Screening Questionnaire 2017 Do you ever feel afraid of your partner? Debora Null Are you in a relationship with someone who physically or mentally threatens you? Debora Null Is it safe for you to go home? Gracy Ford Coordination of Care Questionaire: 1. Have you been to the ER, urgent care clinic since your last visit? Hospitalized since your last visit? NO 
 
2. Have you seen or consulted any other health care providers outside of the [address] [place] [address] since your last visit? Include any pap smears or colon screening. NO Advanced Directive: 1. Do you have an Advanced Directive?  YES 
 
 2. Would you like information on Advanced Directives?  NO

## 2018-11-12 NOTE — PATIENT INSTRUCTIONS
Medicare Wellness Visit, Female The best way to live healthy is to have a lifestyle where you eat a well-balanced diet, exercise regularly, limit alcohol use, and quit all forms of tobacco/nicotine, if applicable. Regular preventive services are another way to keep healthy. Preventive services (vaccines, screening tests, monitoring & exams) can help personalize your care plan, which helps you manage your own care. Screening tests can find health problems at the earliest stages, when they are easiest to treat. Piyush Arguello follows the current, evidence-based guidelines published by the [facility] Shorty Melani ([facility]) when recommending preventive services for our patients. Because we follow these guidelines, sometimes recommendations change over time as research supports it. (For example, mammograms used to be recommended annually. Even though Medicare will still pay for an annual mammogram, the newer guidelines recommend a mammogram every two years for women of average risk.) Of course, you and your doctor may decide to screen more often for some diseases, based on your risk and your health status. Preventive services for you include: - Medicare offers their members a free annual wellness visit, which is time for you and your primary care provider to discuss and plan for your preventive service needs. Take advantage of this benefit every year! 
-All adults over the age of 72 should receive the recommended pneumonia vaccines. Current USPSTF guidelines recommend a series of two vaccines for the best pneumonia protection.  
-All adults should have a flu vaccine yearly and a tetanus vaccine every 10 years. All adults age 61 and older should receive a shingles vaccine once in their lifetime.   
-A bone mass density test is recommended when a woman turns 65 to screen for osteoporosis. This test is only recommended one time, as a screening. Some providers will use this same test as a disease monitoring tool if you already have osteoporosis. -All adults age 38-68 who are overweight should have a diabetes screening test once every three years.  
-Other screening tests and preventive services for persons with diabetes include: an eye exam to screen for diabetic retinopathy, a kidney function test, a foot exam, and stricter control over your cholesterol.  
-Cardiovascular screening for adults with routine risk involves an electrocardiogram (ECG) at intervals determined by your doctor.  
-Colorectal cancer screenings should be done for adults age 54-65 with no increased risk factors for colorectal cancer. There are a number of acceptable methods of screening for this type of cancer. Each test has its own benefits and drawbacks. Discuss with your doctor what is most appropriate for you during your annual wellness visit. The different tests include: colonoscopy (considered the best screening method), a fecal occult blood test, a fecal DNA test, and sigmoidoscopy. -Breast cancer screenings are recommended every other year for women of normal risk, age 54-69. 
-Cervical cancer screenings for women over age 72 are only recommended with certain risk factors.  
-All adults born between [facility] should be screened once for Hepatitis C. Here is a list of your current Health Maintenance items (your personalized list of preventive services) with a due date: 
Health Maintenance Due Topic Date Due  Shingles Vaccine (1 of 2) 03/13/1981  Glaucoma Screening   01/01/2018 Depression and Chronic Disease: Care Instructions Your Care Instructions A chronic disease is one that you have for a long time. Some chronic diseases can be controlled, but they usually cannot be cured. Depression is common in people with chronic diseases, but it often goes unnoticed.  
Many people have concerns about seeking treatment for a mental health problem. You may think it's a sign of weakness, or you don't want people to know about it. It's important to overcome these reasons for not seeking treatment. Treating depression or anxiety is good for your health. Follow-up care is a key part of your treatment and safety. Be sure to make and go to all appointments, and call your doctor if you are having problems. It's also a good idea to know your test results and keep a list of the medicines you take. How can you care for yourself at home? Watch for symptoms of depression The symptoms of depression are often subtle at first. You may think they are caused by your disease rather than depression. Or you may think it is normal to be depressed when you have a chronic disease. If you are depressed you may: · Feel sad or hopeless. · Feel guilty or worthless. · Not enjoy the things you used to enjoy. · Feel hopeless, as though life is not worth living. · Have trouble thinking or remembering. · Have low energy, and you may not eat or sleep well. · Pull away from others. · Think often about death or killing yourself. (Keep the numbers for these national suicide hotlines: 3-438-109-TALK [1-530.289.3369] and 8-080-DFQORKB [1-755.636.1932]. ) Get treatment By treating your depression, you can feel more hopeful and have more energy. If you feel better, you may take better care of yourself, so your health may improve. · Talk to your doctor if you have any changes in mood during treatment for your disease. · Ask your doctor for help. Counseling, antidepressant medicine, or a combination of the two can help most people with depression. Often a combination works best. Counseling can also help you cope with having a chronic disease. When should you call for help? Call 911 anytime you think you may need emergency care.  For example, call if: 
  · You feel like hurting yourself or someone else.  
  · Someone you know has depression and is about to attempt or is attempting suicide.  
 Call your doctor now or seek immediate medical care if: 
  · You hear voices.  
  · Someone you know has depression and: 
? Starts to give away his or her possessions. ? Uses illegal drugs or drinks alcohol heavily. ? Talks or writes about death, including writing suicide notes or talking about guns, knives, or pills. ? Starts to spend a lot of time alone. ? Acts very aggressively or suddenly appears calm.  
 Watch closely for changes in your health, and be sure to contact your doctor if: 
  · You do not get better as expected. Where can you learn more? Go to http://ward-vin.info/. Enter M713 in the search box to learn more about \"Depression and Chronic Disease: Care Instructions. \" Current as of: December 7, 2017 Content Version: 11.8 © 5962-3541 Healthwise, [employer]. Care instructions adapted under license by [employer] (which disclaims liability or warranty for this information). If you have questions about a medical condition or this instruction, always ask your healthcare professional. [address] any warranty or liability for your use of this information.

## 2018-11-12 NOTE — ACP (ADVANCE CARE PLANNING)
Advance Care Planning (ACP) Provider Conversation Snapshot    Date of ACP Conversation: 11/12/18  Persons included in Conversation:  patient  Length of ACP Conversation in minutes:  <16 minutes (Non-Billable)    Authorized Decision Maker (if patient is incapable of making informed decisions):    This person is:   kingston Carpio Son          For Patients with Decision Making Capacity:   DNR    Conversation Outcomes / Follow-Up Plan:   Reviewed existing Advance Directive   Entered DNR order (If yes, complete Durable DNR form)

## 2018-11-20 NOTE — TELEPHONE ENCOUNTER
Pt called needing a referral to Dr Leonela Amador at [facility] [place].

## 2018-12-12 ENCOUNTER — TELEPHONE (OUTPATIENT)
Dept: FAMILY MEDICINE CLINIC | Age: 83
End: 2018-12-12

## 2018-12-12 NOTE — TELEPHONE ENCOUNTER
Pt called with a few hours of diarrhea. She was advised by covering physician to take 2 immodium and call back if worse or fever begins. Pt's diarrhea has resolved since this morning. She will call tomorrow if symptoms returned.

## 2019-01-17 PROBLEM — R41.3 MEMORY IMPAIRMENT: Status: ACTIVE | Noted: 2019-01-17

## 2019-02-15 ENCOUNTER — OFFICE VISIT (OUTPATIENT)
Dept: FAMILY MEDICINE CLINIC | Age: 84
End: 2019-02-15

## 2019-02-15 VITALS
TEMPERATURE: 98.1 F | BODY MASS INDEX: 26.75 KG/M2 | DIASTOLIC BLOOD PRESSURE: 62 MMHG | HEIGHT: 63 IN | SYSTOLIC BLOOD PRESSURE: 120 MMHG | HEART RATE: 81 BPM | RESPIRATION RATE: 20 BRPM | OXYGEN SATURATION: 98 % | WEIGHT: 151 LBS

## 2019-02-15 DIAGNOSIS — F41.8 DEPRESSION WITH ANXIETY: ICD-10-CM

## 2019-02-15 DIAGNOSIS — F32.1 MODERATE SINGLE CURRENT EPISODE OF MAJOR DEPRESSIVE DISORDER (HCC): ICD-10-CM

## 2019-02-15 DIAGNOSIS — M54.16 LUMBAR RADICULOPATHY: Primary | ICD-10-CM

## 2019-02-15 RX ORDER — GABAPENTIN 300 MG/1
300 CAPSULE ORAL
Qty: 90 CAP | Refills: 0 | Status: SHIPPED | OUTPATIENT
Start: 2019-02-15 | End: 2019-03-05 | Stop reason: ALTCHOICE

## 2019-02-15 RX ORDER — SERTRALINE HYDROCHLORIDE 100 MG/1
100 TABLET, FILM COATED ORAL DAILY
Qty: 90 TAB | Refills: 1 | Status: SHIPPED | OUTPATIENT
Start: 2019-02-15 | End: 2019-05-30 | Stop reason: SDUPTHER

## 2019-02-15 NOTE — PATIENT INSTRUCTIONS
Sciatica: Exercises Your Care Instructions Here are some examples of typical rehabilitation exercises for your condition. Start each exercise slowly. Ease off the exercise if you start to have pain. Your doctor or physical therapist will tell you when you can start these exercises and which ones will work best for you. When you are not being active, find a comfortable position for rest. Some people are comfortable on the floor or a medium-firm bed with a small pillow under their head and another under their knees. Some people prefer to lie on their side with a pillow between their knees. Don't stay in one position for too long. Take short walks (10 to 20 minutes) every 2 to 3 hours. Avoid slopes, hills, and stairs until you feel better. Walk only distances you can manage without pain, especially leg pain. How to do the exercises Back stretches 1. Get down on your hands and knees on the floor. 2. Relax your head and allow it to droop. Round your back up toward the ceiling until you feel a nice stretch in your upper, middle, and lower back. Hold this stretch for as long as it feels comfortable, or about 15 to 30 seconds. 3. Return to the starting position with a flat back while you are on your hands and knees. 4. Let your back sway by pressing your stomach toward the floor. Lift your buttocks toward the ceiling. 5. Hold this position for 15 to 30 seconds. 6. Repeat 2 to 4 times. Follow-up care is a key part of your treatment and safety. Be sure to make and go to all appointments, and call your doctor if you are having problems. It's also a good idea to know your test results and keep a list of the medicines you take. Where can you learn more? Go to http://ward-vin.info/. Enter P014 in the search box to learn more about \"Sciatica: Exercises. \" Current as of: September 20, 2018 Content Version: 11.9 © 7666-8069 [employer].  Care instructions adapted under license by [address] (which disclaims liability or warranty for this information). If you have questions about a medical condition or this instruction, always ask your healthcare professional. [address] any warranty or liability for your use of this information.

## 2019-02-15 NOTE — PROGRESS NOTES
Assessment/Plan: 1. Lumbar radiculopathy 
-trial neurontin at night. 
- gabapentin (NEURONTIN) 300 mg capsule; Take 1 Cap by mouth nightly. Dispense: 90 Cap; Refill: 0 
 
2. Depression with anxiety 
-increase zoloft. The plan was discussed with the patient. The patient verbalized understanding and is in agreement with the plan. All medication potential side effects were discussed with the patient. Health Maintenance:  
Health Maintenance Topic Date Due  Shingrix Vaccine Age 50> (1 of 2) 03/13/1981  MEDICARE YEARLY EXAM  11/13/2019  GLAUCOMA SCREENING Q2Y  12/29/2020  
 DTaP/Tdap/Td series (2 - Td) 09/01/2026  Bone Densitometry (Dexa) Screening  Completed  Pneumococcal 65+ Low/Medium Risk  Completed  Influenza Age 5 to Adult  Completed Carolyn Carter is a 80 y.o. female and presents with Anxiety and Fatigue Subjective: 
Recently had MRI (I reviewed this) which showed bulging discs at every level and arthropathy. Has L radicular pain. It's keeping her up at night with pain radiating down L leg. Has appt with Dr. Edward Dunbar management for possible epidural.  
 
Depression and anxiety - she moved into senior housing. States \"everyone has their own cliques\". States she can't find anyone positive there. She has started a bible study which she enjoys. She has financial stressors, her daughters help pay for this housing. Then her daughters comment on how she spends her money. She is on zoloft. She continues to look for a job, but hasn't been able to find one. She states she \"doesn't care if she dies\". ROS: 
Constitutional: No recent weight change. No weakness/+fatigue. No f/c. Cardiovascular: No CP/palpitations. No RAYA/orthopnea/PND. Respiratory: No cough/sputum, dyspnea, wheezing. Gastointestinal: No dysphagia, reflux. No n/v. No constipation/diarrhea. No melena/rectal bleeding. Psychiatric:  No depression, +anxiety. The problem list was updated as a part of today's visit. Patient Active Problem List  
Diagnosis Code  Depression F32.9  
 HLD (hyperlipidemia) E78.5  Osteopenia M85.80  Diverticulosis K57.90  Allergic rhinitis J30.9  Hearing loss of both ears H91.93  
 OA (osteoarthritis) M19.90  PND (post-nasal drip) R09.82  
 Anxiety F41.9  Nephrolithiasis N20.0  IBS (irritable bowel syndrome) K58.9  Cerebral microvascular disease I67.9  CKD (chronic kidney disease) stage 3, GFR 30-59 ml/min ([facility]) N18.3  DNR (do not resuscitate) Z66  
 ESTHER (obstructive sleep apnea) G47.33  
 Chronic right shoulder pain M25.511, G89.29  
 Memory impairment R41. 3 The PSH, FH were reviewed. SH: Social History Tobacco Use  Smoking status: Never Smoker  Smokeless tobacco: Never Used Substance Use Topics  Alcohol use: No  
 Drug use: No  
 
 
Medications/Allergies: 
Current Outpatient Medications on File Prior to Visit Medication Sig Dispense Refill  loratadine 10 mg cap Take 10 mg by mouth daily.  sertraline (ZOLOFT) 50 mg tablet Take 1 Tab by mouth daily. 90 Tab 0  ibuprofen (MOTRIN) 200 mg tablet Take 200 mg by mouth every eight (8) hours as needed for Pain.  simethicone (GAS RELIEF) 125 mg capsule Take 125 mg by mouth four (4) times daily as needed for Flatulence.  acetaminophen (TYLENOL) 650 mg TbER Take 650 mg by mouth every eight (8) hours. Arthritis  fluticasone (24 HOUR ALLERGY RELIEF) 50 mcg/actuation nasal spray 2 Sprays by Both Nostrils route daily.  dicyclomine (BENTYL) 10 mg capsule TAKE ONE CAPSULE BY MOUTH THREE TIMES A DAY AS NEEDED 90 Cap 0 No current facility-administered medications on file prior to visit. Allergies Allergen Reactions  Other Medication Anaphylaxis MRI Dye  
 Ciprofloxacin Anxiety  Epinephrine Palpitations  Ivp [Fd And C Blue No.1] Swelling Facial swelling many years ago, now only has oral contrast  
 Macrobid [Nitrofurantoin Monohyd/M-Cryst] Other (comments) Whole body starts shaking  Other Medication Palpitations  Prednisone Nausea and Vomiting Objective: 
Visit Vitals /62 (BP 1 Location: Left arm, BP Patient Position: Sitting) Pulse 81 Temp 98.1 °F (36.7 °C) (Oral) Resp 20 Ht 5' 3\" (1.6 m) Wt 151 lb (68.5 kg) SpO2 98% BMI 26.75 kg/m² Constitutional: Well developed, nourished, no distress, alert CV: S1, S2.  RRR. No murmurs/rubs. No thrills palpated. No carotid bruits. Intact distal pulses. No edema. No aortic bruits. Pulm: No abnormalities on inspection. Clear to auscultation bilaterally. No wheezing/rhonchi. Normal effort. Psych: Appropriate affect, judgement and insight. Short-term memory intact.

## 2019-02-22 ENCOUNTER — TELEPHONE (OUTPATIENT)
Dept: FAMILY MEDICINE CLINIC | Age: 84
End: 2019-02-22

## 2019-02-22 NOTE — TELEPHONE ENCOUNTER
Patient called to ask if she should come in because she has a knot on her hand. Confirmed that there was no redness, swelling or warm to the touch. She states that it only hurts when she push on it. She is still able to do her daily activities with no problem. I offered to schedule her an appointment and she stated that she think her yearly nurse will be out to do rounds next week and she will have her look at it. I told her that if it gets to a point and the nurse can't come out and she feels like she wants to come in to be checked to give us a call back and we will schedule her.

## 2019-03-05 ENCOUNTER — OFFICE VISIT (OUTPATIENT)
Dept: FAMILY MEDICINE CLINIC | Age: 84
End: 2019-03-05

## 2019-03-05 ENCOUNTER — HOSPITAL ENCOUNTER (OUTPATIENT)
Dept: LAB | Age: 84
Discharge: HOME | End: 2019-03-05
Payer: MEDICARE

## 2019-03-05 VITALS
TEMPERATURE: 98.1 F | RESPIRATION RATE: 20 BRPM | HEART RATE: 88 BPM | OXYGEN SATURATION: 98 % | DIASTOLIC BLOOD PRESSURE: 60 MMHG | BODY MASS INDEX: 26.4 KG/M2 | WEIGHT: 149 LBS | HEIGHT: 63 IN | SYSTOLIC BLOOD PRESSURE: 128 MMHG

## 2019-03-05 DIAGNOSIS — N30.01 ACUTE CYSTITIS WITH HEMATURIA: Primary | ICD-10-CM

## 2019-03-05 DIAGNOSIS — N30.01 ACUTE CYSTITIS WITH HEMATURIA: ICD-10-CM

## 2019-03-05 DIAGNOSIS — R39.9 UTI SYMPTOMS: ICD-10-CM

## 2019-03-05 DIAGNOSIS — Z63.8 STRESS DUE TO FAMILY TENSION: ICD-10-CM

## 2019-03-05 DIAGNOSIS — Z65.8 PSYCHOSOCIAL STRESSORS: ICD-10-CM

## 2019-03-05 DIAGNOSIS — G47.33 OSA (OBSTRUCTIVE SLEEP APNEA): ICD-10-CM

## 2019-03-05 DIAGNOSIS — F32.A DEPRESSION, UNSPECIFIED DEPRESSION TYPE: ICD-10-CM

## 2019-03-05 LAB
BILIRUB UR QL STRIP: NEGATIVE
GLUCOSE UR-MCNC: NEGATIVE MG/DL
KETONES P FAST UR STRIP-MCNC: NEGATIVE MG/DL
PH UR STRIP: 5 [PH] (ref 4.6–8)
PROT UR QL STRIP: NORMAL
SP GR UR STRIP: 1.03 (ref 1–1.03)
UA UROBILINOGEN AMB POC: NORMAL (ref 0.2–1)
URINALYSIS CLARITY POC: CLEAR
URINALYSIS COLOR POC: YELLOW
URINE BLOOD POC: NORMAL
URINE LEUKOCYTES POC: NORMAL
URINE NITRITES POC: NEGATIVE

## 2019-03-05 PROCEDURE — 87186 SC STD MICRODIL/AGAR DIL: CPT

## 2019-03-05 PROCEDURE — 87086 URINE CULTURE/COLONY COUNT: CPT

## 2019-03-05 PROCEDURE — 87077 CULTURE AEROBIC IDENTIFY: CPT

## 2019-03-05 RX ORDER — AMOXICILLIN AND CLAVULANATE POTASSIUM 500; 125 MG/1; MG/1
1 TABLET, FILM COATED ORAL 2 TIMES DAILY
Qty: 14 TAB | Refills: 0 | Status: SHIPPED | OUTPATIENT
Start: 2019-03-05 | End: 2019-03-12

## 2019-03-05 SDOH — SOCIAL STABILITY - SOCIAL INSECURITY: OTHER SPECIFIED PROBLEMS RELATED TO PRIMARY SUPPORT GROUP: Z63.8

## 2019-03-05 NOTE — PROGRESS NOTES
Chief Complaint   Patient presents with    Anxiety    Urinary Frequency    Thumb Pain     Tender- right side       ASSESSMENT/PLAN  1. Acute cystitis with hematuria  - amoxicillin-clavulanate (AUGMENTIN) 500-125 mg per tablet; Take 1 Tab by mouth two (2) times a day for 7 days. Dispense: 14 Tab; Refill: 0  - CULTURE, URINE; Future    2. Depression, unspecified depression type complicated by psychosocial stressors and family tension. Is seeing counselor.  -cont zoloft. I discussed I could write a letter stating she needs to get out of lease based on emotional distress the finances are causing. 3. UTI symptoms  - AMB POC URINALYSIS DIP STICK AUTO W/ MICRO      Push fluids, may use Pyridium OTC prn. Call or return to clinic prn if these symptoms worsen or fail to improve as anticipated. The plan was discussed with the patient. The patient verbalized understanding and is in agreement with the plan. All medication potential side effects were discussed with the patient. SUBJECTIVE: Jr Olivas is a 80 y.o. female who complains of urinary frequency, urgency and dysuria x 7-14 days, without flank pain, fever, chills, or abnormal vaginal discharge or bleeding. Depression and anxiety - was prescribed higher dose zoloft. She has ongoing issues with her children. She states this makes her 'so depressed'. She feels her children micro-manages her finances b/c they help pay her finances. She is unhappy with her current living situation b/c she lives in a senior facility that is expensive and her children help pay for. However, b/c she feels this is a stressor for her. She is requesting a letter to help her get out of the lease based on emotional distress the financial burden causes. Lumbar radiculopathy - was given neurontin, but pt was concerned about side effects so she didn't start it.      OBJECTIVE:   Visit Vitals  /60 (BP 1 Location: Left arm, BP Patient Position: Sitting)   Pulse 88 Temp 98.1 °F (36.7 °C) (Oral)   Resp 20   Ht 5' 3\" (1.6 m)   Wt 149 lb (67.6 kg)   SpO2 98%   BMI 26.39 kg/m²       Gen: Appears well, in no apparent distress. CV: RRR  Pulm: CTA bilaterally. No wheezes/rales/crackles. : No CVA tenderness noted. Urine dipstick shows positive for RBC's and positive for leukocytes.          Ese Brower MD

## 2019-03-05 NOTE — PROGRESS NOTES
Sanjiv Galvan is a 80 y.o. female (: 3/13/1931) presenting to address:    Chief Complaint   Patient presents with    Anxiety    Urinary Frequency    Thumb Pain     Tender- right side       Vitals:    19 1109   BP: 128/60   Pulse: 88   Resp: 20   Temp: 98.1 °F (36.7 °C)   TempSrc: Oral   SpO2: 98%   Weight: 149 lb (67.6 kg)   Height: 5' 3\" (1.6 m)   PainSc:   0 - No pain       Learning Assessment:     Learning Assessment 2015   PRIMARY LEARNER Patient   HIGHEST LEVEL OF EDUCATION - PRIMARY LEARNER  SOME COLLEGE   BARRIERS PRIMARY LEARNER HEARING   CO-LEARNER CAREGIVER No   PRIMARY LANGUAGE ENGLISH    NEED -   LEARNER PREFERENCE PRIMARY READING   LEARNING SPECIAL TOPICS -   ANSWERED BY self   RELATIONSHIP SELF     Depression Screening:     3 most recent PHQ Screens 2/15/2019   PHQ Not Done Active Diagnosis of Depression or Bipolar Disorder   Little interest or pleasure in doing things -   Feeling down, depressed, irritable, or hopeless -   Total Score PHQ 2 -     Fall Risk Assessment:     Fall Risk Assessment, last 12 mths 10/22/2018   Able to walk? Yes   Fall in past 12 months? No   Fall with injury? -   Number of falls in past 12 months -   Fall Risk Score -     Abuse Screening:     Abuse Screening Questionnaire 2017   Do you ever feel afraid of your partner? N   Are you in a relationship with someone who physically or mentally threatens you? N   Is it safe for you to go home? Y     Coordination of Care Questionaire:   1. Have you been to the ER, urgent care clinic since your last visit? Hospitalized since your last visit? NO    2. Have you seen or consulted any other health care providers outside of the [address] [place] [address] since your last visit? Include any pap smears or colon screening. NO    Advanced Directive:   1. Do you have an Advanced Directive? YES    2. Would you like information on Advanced Directives?  NO

## 2019-03-07 ENCOUNTER — TELEPHONE (OUTPATIENT)
Dept: FAMILY MEDICINE CLINIC | Age: 84
End: 2019-03-07

## 2019-03-07 LAB
BACTERIA SPEC CULT: ABNORMAL
SERVICE CMNT-IMP: ABNORMAL

## 2019-03-07 RX ORDER — GRANULES FOR ORAL 3 G/1
3 POWDER ORAL ONCE
Qty: 1 PACKET | Refills: 0 | Status: SHIPPED | OUTPATIENT
Start: 2019-03-07 | End: 2019-03-07

## 2019-03-07 NOTE — PROGRESS NOTES
Tell pt urine grew e coli NOT sensitive to the augmentin I prescribed.  Need to change to fosfomycin (has allergy to macrobid, cipro)

## 2019-03-07 NOTE — TELEPHONE ENCOUNTER
[facility] pharmacist called with patient there. $85 copay is too high for patient for fosfomycin. The only other abt she could take for the bacteria is cipro. Pt told the pharmacist she shakes all over when she has that and she is unwilling to take it again.t [facility] pharmacist used the goodrx card and it didn't bring down the price. Pt again told the pharmacist she is feeling a bit better with the augmentin. The bacteria she has is resistant to that med. Pt has been advised that she does need the fosfomycin if she's unwilling to try the cipro. The pharmacist will try to explain the need to the patient.

## 2019-03-18 DIAGNOSIS — G89.29 CHRONIC BILATERAL LOW BACK PAIN WITHOUT SCIATICA: ICD-10-CM

## 2019-03-18 DIAGNOSIS — M54.50 CHRONIC BILATERAL LOW BACK PAIN WITHOUT SCIATICA: ICD-10-CM

## 2019-03-18 RX ORDER — TRAMADOL HYDROCHLORIDE 50 MG/1
50 TABLET ORAL
Qty: 20 TAB | Refills: 0 | Status: SHIPPED | OUTPATIENT
Start: 2019-03-18 | End: 2019-04-17

## 2019-03-22 NOTE — TELEPHONE ENCOUNTER
Pt called today. Her symptoms have worsened. She is urinating constantly. She will  the med and pay for it with her credit card. I called [facility] to verify it is still active. They will fill it.

## 2019-03-28 ENCOUNTER — TELEPHONE (OUTPATIENT)
Dept: FAMILY MEDICINE CLINIC | Age: 84
End: 2019-03-28

## 2019-03-28 NOTE — TELEPHONE ENCOUNTER
Left msg for pt asking if she wanted to return to same dr we referred her to in 2016.   Please generate new referral.

## 2019-04-17 ENCOUNTER — TELEPHONE (OUTPATIENT)
Dept: FAMILY MEDICINE CLINIC | Age: 84
End: 2019-04-17

## 2019-04-17 NOTE — TELEPHONE ENCOUNTER
Pt called asking if there is something she can take for back pain. She is taking Tylenol during the day but it's not helping much. She's afraid to take her Tramadol   For this. She declined taking gabapentin for this in Feb. She thinks maybe it's sciatica now.

## 2019-04-26 ENCOUNTER — TELEPHONE (OUTPATIENT)
Dept: FAMILY MEDICINE CLINIC | Age: 84
End: 2019-04-26

## 2019-04-26 NOTE — TELEPHONE ENCOUNTER
Pt called in leaving a message that Dr. Gema Robledo needs to order her a new CPAP machine. [phone] I called [facility] and confirmed they only do diabetic shoes. I called the pulm we referred her to. They have our referral but haven't scheduled her yet to see their dr. She saw him in 2017 and will likely need a new sleep study. They state they will call her today.

## 2019-04-29 ENCOUNTER — HOSPITAL ENCOUNTER (OUTPATIENT)
Dept: LAB | Age: 84
Discharge: HOME | End: 2019-04-29
Payer: MEDICARE

## 2019-04-29 ENCOUNTER — OFFICE VISIT (OUTPATIENT)
Dept: FAMILY MEDICINE CLINIC | Age: 84
End: 2019-04-29

## 2019-04-29 VITALS
HEIGHT: 63 IN | RESPIRATION RATE: 20 BRPM | OXYGEN SATURATION: 95 % | HEART RATE: 85 BPM | BODY MASS INDEX: 26.26 KG/M2 | SYSTOLIC BLOOD PRESSURE: 100 MMHG | DIASTOLIC BLOOD PRESSURE: 60 MMHG | WEIGHT: 148.2 LBS | TEMPERATURE: 98.6 F

## 2019-04-29 DIAGNOSIS — R39.9 UTI SYMPTOMS: ICD-10-CM

## 2019-04-29 DIAGNOSIS — R39.9 UTI SYMPTOMS: Primary | ICD-10-CM

## 2019-04-29 DIAGNOSIS — N32.81 OAB (OVERACTIVE BLADDER): ICD-10-CM

## 2019-04-29 PROBLEM — K80.20 CALCULUS OF GALLBLADDER WITHOUT CHOLECYSTITIS WITHOUT OBSTRUCTION: Status: ACTIVE | Noted: 2019-04-29

## 2019-04-29 LAB
BILIRUB UR QL STRIP: NEGATIVE
GLUCOSE UR-MCNC: NEGATIVE MG/DL
KETONES P FAST UR STRIP-MCNC: NEGATIVE MG/DL
PH UR STRIP: 5.5 [PH] (ref 4.6–8)
PROT UR QL STRIP: NEGATIVE
SP GR UR STRIP: 1.02 (ref 1–1.03)
UA UROBILINOGEN AMB POC: NORMAL (ref 0.2–1)
URINALYSIS CLARITY POC: CLEAR
URINALYSIS COLOR POC: YELLOW
URINE BLOOD POC: NEGATIVE
URINE LEUKOCYTES POC: NORMAL
URINE NITRITES POC: NEGATIVE

## 2019-04-29 PROCEDURE — 87086 URINE CULTURE/COLONY COUNT: CPT

## 2019-04-29 RX ORDER — AMOXICILLIN AND CLAVULANATE POTASSIUM 500; 125 MG/1; MG/1
1 TABLET, FILM COATED ORAL 2 TIMES DAILY
Qty: 14 TAB | Refills: 0 | Status: SHIPPED | OUTPATIENT
Start: 2019-04-29 | End: 2019-05-06

## 2019-04-29 RX ORDER — OXYBUTYNIN CHLORIDE 10 MG/1
10 TABLET, EXTENDED RELEASE ORAL DAILY
Qty: 30 TAB | Refills: 0 | Status: SHIPPED | OUTPATIENT
Start: 2019-04-29 | End: 2019-08-16 | Stop reason: SDDI

## 2019-04-29 NOTE — PROGRESS NOTES
Nydia Mendoza is a 80 y.o. female (: 3/13/1931) presenting to address:    Chief Complaint   Patient presents with    UTI     Symptoms       Vitals:    19 1502   BP: 100/60   Pulse: 85   Resp: 20   Temp: 98.6 °F (37 °C)   TempSrc: Oral   SpO2: 95%   Weight: 148 lb 3.2 oz (67.2 kg)   Height: 5' 3\" (1.6 m)   PainSc:   0 - No pain       Learning Assessment:     Learning Assessment 2015   PRIMARY LEARNER Patient   HIGHEST LEVEL OF EDUCATION - PRIMARY LEARNER  SOME COLLEGE   BARRIERS PRIMARY LEARNER HEARING   CO-LEARNER CAREGIVER No   PRIMARY LANGUAGE ENGLISH    NEED -   LEARNER PREFERENCE PRIMARY READING   LEARNING SPECIAL TOPICS -   ANSWERED BY self   RELATIONSHIP SELF     Depression Screening:     3 most recent PHQ Screens 2/15/2019   PHQ Not Done Active Diagnosis of Depression or Bipolar Disorder   Little interest or pleasure in doing things -   Feeling down, depressed, irritable, or hopeless -   Total Score PHQ 2 -     Fall Risk Assessment:     Fall Risk Assessment, last 12 mths 10/22/2018   Able to walk? Yes   Fall in past 12 months? No   Fall with injury? -   Number of falls in past 12 months -   Fall Risk Score -     Abuse Screening:     Abuse Screening Questionnaire 2017   Do you ever feel afraid of your partner? N   Are you in a relationship with someone who physically or mentally threatens you? N   Is it safe for you to go home? Y     Coordination of Care Questionaire:   1. Have you been to the ER, urgent care clinic since your last visit? Hospitalized since your last visit? NO    2. Have you seen or consulted any other health care providers outside of the [address] [place] [address] since your last visit? Include any pap smears or colon screening. NO    Advanced Directive:   1. Do you have an Advanced Directive? YES    2. Would you like information on Advanced Directives?  NO

## 2019-04-29 NOTE — PROGRESS NOTES
Chief Complaint   Patient presents with    UTI     Symptoms       ASSESSMENT/PLAN  1. UTI symptoms  -will try treating with augmentin, however, will send for culture. Culture 3/2019 showed E coli (resistant to augmentin) which could only be treated with fosfomycin given her numerous intolerances and allergies. - AMB POC URINALYSIS DIP STICK AUTO W/O MICRO  - CULTURE, URINE; Future  - amoxicillin-clavulanate (AUGMENTIN) 500-125 mg per tablet; Take 1 Tab by mouth two (2) times a day for 7 days. Dispense: 14 Tab; Refill: 0    2. oab- trial detrol as sx sound predominantly due to bladder spasm    Push fluids, may use Pyridium OTC prn. Call or return to clinic prn if these symptoms worsen or fail to improve as anticipated. The plan was discussed with the patient. The patient verbalized understanding and is in agreement with the plan. All medication potential side effects were discussed with the patient. SUBJECTIVE: Charity Ochoa is a 80 y.o. female who complains of urinary frequency, urgency , without flank pain, dysuria, fever, chills, or abnormal vaginal discharge or bleeding. It's unclear if she took the fosfomycin prescribed 3/2019. OBJECTIVE:   Visit Vitals  /60 (BP 1 Location: Left arm, BP Patient Position: Sitting)   Pulse 85   Temp 98.6 °F (37 °C) (Oral)   Resp 20   Ht 5' 3\" (1.6 m)   Wt 148 lb 3.2 oz (67.2 kg)   SpO2 95%   BMI 26.25 kg/m²       Gen: Appears well, in no apparent distress. CV: RRR  Pulm: CTA bilaterally. No wheezes/rales/crackles. : No CVA tenderness noted. Urine dipstick shows positive for RBC's and positive for leukocytes.            Kevin Galaviz MD

## 2019-05-01 LAB
BACTERIA SPEC CULT: NORMAL
SERVICE CMNT-IMP: NORMAL

## 2019-05-01 NOTE — PROGRESS NOTES
Tell pt the culture didn't grow any bacteria. I would recommend trying the oxybutinin prescribed as her symptoms may be more OAB than infection.

## 2019-05-23 DIAGNOSIS — F32.1 MODERATE SINGLE CURRENT EPISODE OF MAJOR DEPRESSIVE DISORDER (HCC): ICD-10-CM

## 2019-05-23 RX ORDER — DICYCLOMINE HYDROCHLORIDE 10 MG/1
CAPSULE ORAL
Qty: 90 CAP | Refills: 0 | Status: SHIPPED | OUTPATIENT
Start: 2019-05-23 | End: 2019-07-18 | Stop reason: SDUPTHER

## 2019-05-23 RX ORDER — SERTRALINE HYDROCHLORIDE 100 MG/1
100 TABLET, FILM COATED ORAL DAILY
Qty: 90 TAB | Refills: 1 | Status: CANCELLED | OUTPATIENT
Start: 2019-05-23

## 2019-05-23 NOTE — TELEPHONE ENCOUNTER
Last refilled zoloft 2/15/19 for 90 with 1 to local . Dicyclomine 9/27/17 for 90 tabs 0 refills . Escitalopram last refilled 8/23/18 and ended 9/11/18 . Last OV  4.29.19 NO future Ov scheduled.

## 2019-05-29 ENCOUNTER — OFFICE VISIT (OUTPATIENT)
Dept: FAMILY MEDICINE CLINIC | Age: 84
End: 2019-05-29

## 2019-05-30 DIAGNOSIS — F32.1 MODERATE SINGLE CURRENT EPISODE OF MAJOR DEPRESSIVE DISORDER (HCC): ICD-10-CM

## 2019-05-30 RX ORDER — SERTRALINE HYDROCHLORIDE 100 MG/1
100 TABLET, FILM COATED ORAL DAILY
Qty: 90 TAB | Refills: 1 | Status: SHIPPED | OUTPATIENT
Start: 2019-05-30 | End: 2019-08-16 | Stop reason: SDDI

## 2019-05-30 NOTE — TELEPHONE ENCOUNTER
[facility] pharmacy called asking for refill of zoloft . We sent it to [facility] previously. They also asked for a refill for lexapro but I told them this was discontinued in  Sept 2018.

## 2019-05-31 ENCOUNTER — OFFICE VISIT (OUTPATIENT)
Dept: FAMILY MEDICINE CLINIC | Age: 84
End: 2019-05-31

## 2019-05-31 VITALS
DIASTOLIC BLOOD PRESSURE: 60 MMHG | HEIGHT: 63 IN | WEIGHT: 147 LBS | HEART RATE: 74 BPM | BODY MASS INDEX: 26.05 KG/M2 | TEMPERATURE: 98.1 F | RESPIRATION RATE: 17 BRPM | OXYGEN SATURATION: 96 % | SYSTOLIC BLOOD PRESSURE: 112 MMHG

## 2019-05-31 DIAGNOSIS — F32.A DEPRESSION, UNSPECIFIED DEPRESSION TYPE: ICD-10-CM

## 2019-05-31 DIAGNOSIS — R63.4 UNINTENTIONAL WEIGHT LOSS: Primary | ICD-10-CM

## 2019-05-31 DIAGNOSIS — R41.3 MEMORY IMPAIRMENT: ICD-10-CM

## 2019-05-31 NOTE — PROGRESS NOTES
Assessment/Plan:    1. Unintentional weight loss  -protein shake/ensure for snack  - TSH 3RD GENERATION; Future    2. Depression, unspecified depression type and memory impairment  -remains uncontrolled. And I discussed how depression can mimic dementia. I would like her to do neuropsych testing for complete eval.  She continues to see a therapist. Has failed several agents due to intolerance. - REFERRAL TO NEUROPSYCHOLOGY  - VITAMIN B12; Future    A total of 35 minutes was spent with the patient of which 35 minutes was spent in counseling regarding dementia and depression, need for additional testing. The plan was discussed with the patient. The patient verbalized understanding and is in agreement with the plan. All medication potential side effects were discussed with the patient. Health Maintenance:   Health Maintenance   Topic Date Due    Shingrix Vaccine Age 49> (1 of 2) 03/13/1981    Pneumococcal 65+ years (2 of 2 - PPSV23) 02/16/2019    Influenza Age 9 to Adult  08/01/2019    MEDICARE YEARLY EXAM  11/13/2019    GLAUCOMA SCREENING Q2Y  12/29/2020    DTaP/Tdap/Td series (2 - Td) 09/01/2026    Bone Densitometry (Dexa) Screening  Completed       Arely Mcneill is a 80 y.o. female and presents with Weight Management     Subjective:  Ortho reported some concerns about patient's memory. Today she called office stating she received a call telling her to come early (but our office did not do this). She does have underlying depression and anxiety, that is not well controlled. MOCA score 17/30. She had 0/5 word delayed recall. She also reports concern about wt loss. In the past 6mos, she's lost 7lb. She states she's eating 3 meals/day. No n/v/diarrhea. No abd pain when eating. ROS:  Constitutional: + recent weight change. No weakness/fatigue. No f/c. Cardiovascular: No CP/palpitations. No RAYA/orthopnea/PND. Respiratory: No cough/sputum, dyspnea, wheezing.    Allergy/Immunology: No seasonal rhinitis. Denies frequent colds, sinus/ear infections. Neurological: No seizures/numbness/weakness. No paresthesias. Psychiatric:  + depression, +anxiety. The problem list was updated as a part of today's visit. Patient Active Problem List   Diagnosis Code    Depression F32.9    HLD (hyperlipidemia) E78.5    Osteopenia M85.80    Diverticulosis K57.90    Allergic rhinitis J30.9    Hearing loss of both ears H91.93    OA (osteoarthritis) M19.90    PND (post-nasal drip) R09.82    Anxiety F41.9    Nephrolithiasis N20.0    IBS (irritable bowel syndrome) K58.9    Cerebral microvascular disease I67.9    CKD (chronic kidney disease) stage 3, GFR 30-59 ml/min ([facility]) N18.3    DNR (do not resuscitate) Z73    ESTHER (obstructive sleep apnea) G47.33    Chronic right shoulder pain M25.511, G89.29    Memory impairment R41.3    Stress due to family tension Z63.8    Psychosocial stressors Z65.8    OAB (overactive bladder) N32.81    Calculus of gallbladder without cholecystitis without obstruction K80.20       The PSH, FH were reviewed. SH:  Social History     Tobacco Use    Smoking status: Never Smoker    Smokeless tobacco: Never Used   Substance Use Topics    Alcohol use: No    Drug use: No     Types: Prescription       Medications/Allergies:  Current Outpatient Medications on File Prior to Visit   Medication Sig Dispense Refill    sertraline (ZOLOFT) 100 mg tablet Take 1 Tab by mouth daily. 90 Tab 1    dicyclomine (BENTYL) 10 mg capsule TAKE ONE CAPSULE BY MOUTH THREE TIMES A DAY AS NEEDED 90 Cap 0    oxybutynin chloride XL (DITROPAN XL) 10 mg CR tablet Take 1 Tab by mouth daily. Indications: Needing to Urinate Immediately 30 Tab 0    loratadine 10 mg cap Take 10 mg by mouth daily.  ibuprofen (MOTRIN) 200 mg tablet Take 200 mg by mouth every eight (8) hours as needed for Pain.       simethicone (GAS RELIEF) 125 mg capsule Take 125 mg by mouth four (4) times daily as needed for Flatulence.  acetaminophen (TYLENOL) 650 mg TbER Take 650 mg by mouth every eight (8) hours. Arthritis      fluticasone (24 HOUR ALLERGY RELIEF) 50 mcg/actuation nasal spray 2 Sprays by Both Nostrils route daily. No current facility-administered medications on file prior to visit. Allergies   Allergen Reactions    Other Medication Anaphylaxis     MRI Dye    Ciprofloxacin Anxiety     Whole body starts shaking    Epinephrine Palpitations    Ivp [Fd And C Blue No.1] Swelling     Facial swelling many years ago, now only has oral contrast    Macrobid [Nitrofurantoin Monohyd/M-Cryst] Other (comments)     Whole body starts shaking       Other Medication Palpitations    Prednisone Nausea and Vomiting       Objective:  Visit Vitals  /60 (BP 1 Location: Left arm, BP Patient Position: Sitting)   Pulse 74   Temp 98.1 °F (36.7 °C) (Oral)   Resp 17   Ht 5' 3\" (1.6 m)   Wt 147 lb (66.7 kg)   SpO2 96%   BMI 26.04 kg/m²      Constitutional: Well developed, nourished, no distress, alert   Psych: Appropriate affect, judgement and insight. Short-term memory impaired.

## 2019-05-31 NOTE — PROGRESS NOTES
Nemesio Vazquez is a 80 y.o. female (: 3/13/1931) presenting to address:    Chief Complaint   Patient presents with    Weight Management       Vitals:    19 0849   BP: 112/60   Pulse: 74   Resp: 17   Temp: 98.1 °F (36.7 °C)   TempSrc: Oral   SpO2: 96%   Weight: 147 lb (66.7 kg)   Height: 5' 3\" (1.6 m)   PainSc:   7   PainLoc: Hip       Hearing/Vision:   No exam data present    Learning Assessment:     Learning Assessment 2015   PRIMARY LEARNER Patient   HIGHEST LEVEL OF EDUCATION - PRIMARY LEARNER  SOME COLLEGE   BARRIERS PRIMARY LEARNER HEARING   CO-LEARNER CAREGIVER No   PRIMARY LANGUAGE ENGLISH    NEED -   LEARNER PREFERENCE PRIMARY READING   LEARNING SPECIAL TOPICS -   ANSWERED BY self   RELATIONSHIP SELF     Depression Screening:     3 most recent PHQ Screens 2/15/2019   PHQ Not Done Active Diagnosis of Depression or Bipolar Disorder   Little interest or pleasure in doing things -   Feeling down, depressed, irritable, or hopeless -   Total Score PHQ 2 -     Fall Risk Assessment:     Fall Risk Assessment, last 12 mths 10/22/2018   Able to walk? Yes   Fall in past 12 months? No   Fall with injury? -   Number of falls in past 12 months -   Fall Risk Score -     Abuse Screening:     Abuse Screening Questionnaire 2017   Do you ever feel afraid of your partner? N   Are you in a relationship with someone who physically or mentally threatens you? N   Is it safe for you to go home? Y     Coordination of Care Questionaire:   1. Have you been to the ER, urgent care clinic since your last visit? Hospitalized since your last visit? No     2. Have you seen or consulted any other health care providers outside of the [address] [place] [address] since your last visit? Include any pap smears or colon screening. Yes, [facility]  [facility] [address]     Advanced Directive:   1. Do you have an Advanced Directive? No     2.  Would you like information on Advanced Directives  No Health Maintenance Due   Topic Date Due    Shingrix Vaccine Age 49> (1 of 2) 03/13/1981    Pneumococcal 65+ years (2 of 2 - PPSV23) 02/16/2019

## 2019-06-03 ENCOUNTER — TELEPHONE (OUTPATIENT)
Dept: FAMILY MEDICINE CLINIC | Age: 84
End: 2019-06-03

## 2019-06-03 DIAGNOSIS — L98.9 SKIN LESION: Primary | ICD-10-CM

## 2019-06-03 NOTE — TELEPHONE ENCOUNTER
Patient called and stated that she has some growth on her right side that she forgot to mention in her appointment last week, patient would like to see dermatology and would like a referral.     Patient would like a call once referral is placed.

## 2019-06-13 ENCOUNTER — TELEPHONE (OUTPATIENT)
Dept: FAMILY MEDICINE CLINIC | Age: 84
End: 2019-06-13

## 2019-06-13 NOTE — TELEPHONE ENCOUNTER
Pt called , left msg at nurse station. She asked for labs to be done. States she lost another pound and her daughter requested labs to be done. Labs were ordered 5/31/19 and not completed. I called pt, left msg, asked pt to come in for the labs that are in the system.

## 2019-06-15 LAB
ALBUMIN SERPL-MCNC: 4.1 G/DL (ref 3.5–4.7)
ALBUMIN/GLOB SERPL: 1.7 {RATIO} (ref 1.2–2.2)
ALP SERPL-CCNC: 74 IU/L (ref 39–117)
ALT SERPL-CCNC: 14 IU/L (ref 0–32)
AST SERPL-CCNC: 15 IU/L (ref 0–40)
BILIRUB SERPL-MCNC: 0.4 MG/DL (ref 0–1.2)
BUN SERPL-MCNC: 25 MG/DL (ref 8–27)
BUN/CREAT SERPL: 26 (ref 12–28)
CALCIUM SERPL-MCNC: 9.3 MG/DL (ref 8.7–10.3)
CHLORIDE SERPL-SCNC: 104 MMOL/L (ref 96–106)
CO2 SERPL-SCNC: 24 MMOL/L (ref 20–29)
CREAT SERPL-MCNC: 0.97 MG/DL (ref 0.57–1)
GLOBULIN SER CALC-MCNC: 2.4 G/DL (ref 1.5–4.5)
GLUCOSE SERPL-MCNC: 78 MG/DL (ref 65–99)
INTERPRETATION: NORMAL
POTASSIUM SERPL-SCNC: 4.8 MMOL/L (ref 3.5–5.2)
PROT SERPL-MCNC: 6.5 G/DL (ref 6–8.5)
SODIUM SERPL-SCNC: 140 MMOL/L (ref 134–144)
TSH SERPL DL<=0.005 MIU/L-ACNC: 1.54 UIU/ML (ref 0.45–4.5)
VIT B12 SERPL-MCNC: 255 PG/ML (ref 232–1245)

## 2019-06-21 DIAGNOSIS — E53.8 B12 DEFICIENCY: Primary | ICD-10-CM

## 2019-06-21 RX ORDER — LANOLIN ALCOHOL/MO/W.PET/CERES
1000 CREAM (GRAM) TOPICAL DAILY
COMMUNITY
Start: 2019-06-21 | End: 2019-11-20 | Stop reason: ALTCHOICE

## 2019-06-21 NOTE — PROGRESS NOTES
Tell pt her labs look good, except b12 is low. I want her to start an otc b12 tab daily.   Recheck in 1 mo

## 2019-07-18 RX ORDER — DICYCLOMINE HYDROCHLORIDE 10 MG/1
CAPSULE ORAL
Qty: 90 CAP | Refills: 0 | Status: SHIPPED | OUTPATIENT
Start: 2019-07-18 | End: 2019-08-16 | Stop reason: ALTCHOICE

## 2019-07-25 ENCOUNTER — PATIENT OUTREACH (OUTPATIENT)
Dept: FAMILY MEDICINE CLINIC | Age: 84
End: 2019-07-25

## 2019-07-25 NOTE — Clinical Note
Patient d/c from [facility] 7/21-24/2019 S[facility] S/P Lap. Had to leave a message for TIM OVIEDO f/u call.

## 2019-07-25 NOTE — PROGRESS NOTES
.  Hospital Discharge Follow-Up      Date/Time:  7/25/2019 3:14 PM    Patient was admitted to [facility] on 7/21/19 and discharged on 7/24/2019 for SBO,Partial S/P Lap. The physician discharge summary was available at the time of outreach. ACM attempted outreach call on business day 1 of discharge. Had to leave contact information for return call. Method of communication with provider :chart routing    Inpatient RRAT score: . Medium Risk            19       Total Score        3 Has Seen PCP in Last 6 Months (Yes=3, No=0)    16 Charlson Comorbidity Score (Age + Comorbid Conditions)        Criteria that do not apply:    . Living with Significant Other. Assisted Living. LTAC. SNF. or   Rehab    Patient Length of Stay (>5 days = 3)    IP Visits Last 12 Months (1-3=4, 4=9, >4=11)    Pt. Coverage (Medicare=5 , Medicaid, or Self-Pay=4)        Was this a readmission?  no

## 2019-07-26 ENCOUNTER — TELEPHONE (OUTPATIENT)
Dept: FAMILY MEDICINE CLINIC | Age: 84
End: 2019-07-26

## 2019-07-26 ENCOUNTER — PATIENT OUTREACH (OUTPATIENT)
Dept: FAMILY MEDICINE CLINIC | Age: 84
End: 2019-07-26

## 2019-07-26 PROBLEM — Z86.73 HX-TIA (TRANSIENT ISCHEMIC ATTACK): Status: ACTIVE | Noted: 2017-01-06

## 2019-07-26 PROBLEM — K56.600 SMALL BOWEL OBSTRUCTION, PARTIAL (HCC): Status: ACTIVE | Noted: 2019-07-21

## 2019-07-26 RX ORDER — TRAMADOL HYDROCHLORIDE 50 MG/1
50 TABLET ORAL
COMMUNITY
End: 2019-10-16 | Stop reason: ALTCHOICE

## 2019-07-26 RX ORDER — DEXTROMETHORPHAN HYDROBROMIDE, GUAIFENESIN 5; 100 MG/5ML; MG/5ML
1300 LIQUID ORAL 2 TIMES DAILY
COMMUNITY
End: 2020-03-05 | Stop reason: ALTCHOICE

## 2019-07-26 RX ORDER — KETOCONAZOLE 20 MG/G
1 CREAM TOPICAL DAILY
COMMUNITY
End: 2019-08-16 | Stop reason: ALTCHOICE

## 2019-07-26 NOTE — PROGRESS NOTES
.  Hospital Discharge Follow-Up      Date/Time:  2019 9:43 AM    Patient was admitted to [facility] on 2019 and discharged on 2019 for SBO, Hernias. The physician discharge summary was available at the time of outreach. Patient returned ACM call on  business day 2 of discharge. Very pleasant, she was asking about her sertraline (ZOLOFT) 100 mg . State's it came from [facility] Yesterday did not know who ordered it. Noted in patient record from 2019 that this was a refill order. Patient repeated her [facility] Appointment date  @ 2:00 Pm with Dr Voss Spine reviewed with the provider   S/P SBO with hernia repair         Method of communication with provider :chart routing    Inpatient RRAT score: . Medium Risk            19       Total Score        3 Has Seen PCP in Last 6 Months (Yes=3, No=0)    16 Charlson Comorbidity Score (Age + Comorbid Conditions)        Criteria that do not apply:    . Living with Significant Other. Assisted Living. LTAC. SNF. or   Rehab    Patient Length of Stay (>5 days = 3)    IP Visits Last 12 Months (1-3=4, 4=9, >4=11)    Pt. Coverage (Medicare=5 , Medicaid, or Self-Pay=4)         Was this a readmission? no   Nurse Navigator (NN) contacted the patient by telephone to perform post hospital discharge assessment. Verified name and  with patient as identifiers. Provided introduction to self, and explanation of the Nurse Navigator role. Reviewed discharge instructions and red flags with patient who verbalized understanding. Patient given an opportunity to ask questions and does not have any further questions or concerns at this time. The patient agrees to contact the PCP office for questions related to their healthcare. NN provided contact information for future reference. Disease Specific:   N/A    Summary of patient's top problems:  1.  Post op Lap SBO with hernia repair      Home Health orders at discharge: NONE      Durable Medical Equipment ordered/company: NONE      Barriers to care? none    Advance Care Planning:   Does patient have an Advance Directive:  reviewed and current     Medication(s):   New Medications at Discharge: None  Changed Medications at Discharge: None  Discontinued Medications at Discharge: None    Medication reconciliation was performed with patient, who verbalizes understanding of administration of home medications. There were no barriers to obtaining medications identified at this time. Referral to Pharm D needed: no     Current Outpatient Medications   Medication Sig    traMADol (ULTRAM) 50 mg tablet Take 50 mg by mouth every six (6) hours as needed.  ketoconazole (NIZORAL) 2 % topical cream Apply 1 Applicator to affected area daily.  acetaminophen (TYLENOL) 650 mg TbER Take 1,300 mg by mouth two (2) times a day.  dicyclomine (BENTYL) 10 mg capsule TAKE 1 CAPSULE THREE TIMES DAILY AS NEEDED    cyanocobalamin 1,000 mcg tablet Take 1 Tab by mouth daily.  sertraline (ZOLOFT) 100 mg tablet Take 1 Tab by mouth daily.  oxybutynin chloride XL (DITROPAN XL) 10 mg CR tablet Take 1 Tab by mouth daily. Indications: Needing to Urinate Immediately    loratadine 10 mg cap Take 10 mg by mouth daily.  ibuprofen (MOTRIN) 200 mg tablet Take 200 mg by mouth every eight (8) hours as needed for Pain.  simethicone (GAS RELIEF) 125 mg capsule Take 125 mg by mouth four (4) times daily as needed for Flatulence.  fluticasone (24 HOUR ALLERGY RELIEF) 50 mcg/actuation nasal spray 2 Sprays by Both Nostrils route daily. No current facility-administered medications for this visit.         Medications Discontinued During This Encounter   Medication Reason    acetaminophen (TYLENOL) 650 mg TbER Dose Adjustment       [facility] follow up appointment(s):   Future Appointments   Date Time Provider Adalid Asif   7/29/2019  2:00 PM MD ELENA Cruz      Non-[facility] follow up appointment(s): Shr[facility]quezSurgeon  [facility]:  n/a       Goals        Post Hospitalization     Attends follow-up appointments as directed. Patient will keep DAVID f/u appointment for 7/29 @ 2:00 PM       Prevent complications post hospitalization. ACM will monitor weekly for readmission or ED visits x 30 days       Returns to baseline activity level. [facility] will [facility] with in 2 weeks       Understands red flags post discharge. Patient will monitor incision for S&S of healing VS infection:  Increase redness, drainage hot to touch and painful.   Temp >101.5 or < 97.5 with chills, SOB

## 2019-07-26 NOTE — TELEPHONE ENCOUNTER
Pt called to inform us that [address] [place] [address],-1 [facility] does not accept her ins and will need to be referred elsewhere. I did call the office and confirmed this information.

## 2019-07-29 ENCOUNTER — TELEPHONE (OUTPATIENT)
Dept: FAMILY MEDICINE CLINIC | Age: 84
End: 2019-07-29

## 2019-07-29 ENCOUNTER — OFFICE VISIT (OUTPATIENT)
Dept: FAMILY MEDICINE CLINIC | Age: 84
End: 2019-07-29

## 2019-07-29 VITALS
RESPIRATION RATE: 18 BRPM | BODY MASS INDEX: 26.86 KG/M2 | SYSTOLIC BLOOD PRESSURE: 134 MMHG | HEART RATE: 65 BPM | TEMPERATURE: 98.6 F | OXYGEN SATURATION: 95 % | WEIGHT: 151.6 LBS | DIASTOLIC BLOOD PRESSURE: 75 MMHG | HEIGHT: 63 IN

## 2019-07-29 DIAGNOSIS — K40.30 NON-RECURRENT UNILATERAL INGUINAL HERNIA WITH OBSTRUCTION WITHOUT GANGRENE: ICD-10-CM

## 2019-07-29 DIAGNOSIS — K56.609 SBO (SMALL BOWEL OBSTRUCTION) (HCC): Primary | ICD-10-CM

## 2019-07-29 DIAGNOSIS — Z09 HOSPITAL DISCHARGE FOLLOW-UP: ICD-10-CM

## 2019-07-29 NOTE — PROGRESS NOTES
Sheldon Malhotra is a 80 y.o. female (: 3/13/1931) presenting to address: Patient went to the ER thinking she was having an IBS flare, and they took x-rays, and she had surgery for two hernias. Chief Complaint   Patient presents with   [facility] Follow Up       Vitals:    19 1406   BP: 134/75   Pulse: 65   Resp: 18   Temp: 98.6 °F (37 °C)   TempSrc: Oral   SpO2: 95%   Weight: 151 lb 9.6 oz (68.8 kg)   Height: 5' 3\" (1.6 m)   PainSc:   4   PainLoc: Abdomen       Hearing/Vision:   No exam data present    Learning Assessment:     Learning Assessment 2015   PRIMARY LEARNER Patient   HIGHEST LEVEL OF EDUCATION - PRIMARY LEARNER  SOME COLLEGE   BARRIERS PRIMARY LEARNER HEARING   CO-LEARNER CAREGIVER No   PRIMARY LANGUAGE ENGLISH    NEED -   LEARNER PREFERENCE PRIMARY READING   LEARNING SPECIAL TOPICS -   ANSWERED BY self   RELATIONSHIP SELF     Depression Screening:     3 most recent PHQ Screens 2/15/2019   PHQ Not Done Active Diagnosis of Depression or Bipolar Disorder   Little interest or pleasure in doing things -   Feeling down, depressed, irritable, or hopeless -   Total Score PHQ 2 -     Fall Risk Assessment:     Fall Risk Assessment, last 12 mths 2019   Able to walk? Yes   Fall in past 12 months? No   Fall with injury? -   Number of falls in past 12 months -   Fall Risk Score -     Abuse Screening:     Abuse Screening Questionnaire 2019   Do you ever feel afraid of your partner? N   Are you in a relationship with someone who physically or mentally threatens you? N   Is it safe for you to go home? Y     Coordination of Care Questionaire:   1. Have you been to the ER, urgent care clinic since your last visit? Hospitalized since your last visit? [address] [place] [address]. Had surgery on hernia. 2. Have you seen or consulted any other health care providers outside of the [address] [place] [address] since your last visit? Include any pap smears or colon screening. NO    Advanced Directive:   1. Do you have an Advanced Directive? YES    2. Would you like information on Advanced Directives?  NO

## 2019-07-29 NOTE — PROGRESS NOTES
Assessment/Plan:    1. SBO (small bowel obstruction) (HCC) and  Non-recurrent unilateral inguinal hernia with obstruction without gangrene  -s/p surgical repair. 2. Hospital discharge follow-up      The plan was discussed with the patient. The patient verbalized understanding and is in agreement with the plan. All medication potential side effects were discussed with the patient. Health Maintenance:   Health Maintenance   Topic Date Due    Shingrix Vaccine Age 49> (1 of 2) 03/13/1981    Influenza Age 5 to Adult  08/01/2019    MEDICARE YEARLY EXAM  11/13/2019    Pneumococcal 65+ years (2 of 2 - PPSV23) 06/27/2020    GLAUCOMA SCREENING Q2Y  12/29/2020    DTaP/Tdap/Td series (2 - Td) 09/01/2026    Bone Densitometry (Dexa) Screening  Completed       Ledy Walden is a 80 y.o. female and presents with Hospital Follow Up     Subjective:  Ms. René Domínguez is a 80y.o. year old female, she is seen today for Transition of Care services following a hospital discharge for SBO and incarcerated hernia (L pelvic and L inguinal) on 7/24/19. Our office Nurse Navigator performed an outreach to Ms. Ayon on 7/26 (within 2 business days of discharge) to complete medication reconciliation and a telephonic assessment of her condition. She's moving her bowels and eating well. Feels a little sore. Labs reviewed, were normal.  Hospital course was uncomplicated. ROS:  Constitutional: No recent weight change. No weakness/fatigue. No f/c. Cardiovascular: No CP/palpitations. No RAYA/orthopnea/PND. Respiratory: No cough/sputum, dyspnea, wheezing. Gastointestinal: No dysphagia, reflux. No n/v. No constipation/diarrhea. No melena/rectal bleeding. Genitourinary: No dysuria, urinary hesitancy, nocturia, hematuria. No incontinence. The problem list was updated as a part of today's visit.   Patient Active Problem List   Diagnosis Code    Depression F32.9    HLD (hyperlipidemia) E78.5    Osteopenia M85.80  Diverticulosis K57.90    Allergic rhinitis J30.9    Hearing loss of both ears H91.93    OA (osteoarthritis) M19.90    PND (post-nasal drip) R09.82    Anxiety F41.9    Nephrolithiasis N20.0    IBS (irritable bowel syndrome) K58.9    Cerebral microvascular disease I67.9    CKD (chronic kidney disease) stage 3, GFR 30-59 ml/min (HCC) N18.3    DNR (do not resuscitate) Z73    ESTHER (obstructive sleep apnea) G47.33    Chronic right shoulder pain M25.511, G89.29    Memory impairment R41.3    Stress due to family tension Z63.8    Psychosocial stressors Z65.8    OAB (overactive bladder) N32.81    Calculus of gallbladder without cholecystitis without obstruction K80.20    B12 deficiency E53.8    Hx-TIA (transient ischemic attack) Z86.73    Small bowel obstruction, partial (HCC) K56.600       The PSH, FH were reviewed. SH:  Social History     Tobacco Use    Smoking status: Never Smoker    Smokeless tobacco: Never Used   Substance Use Topics    Alcohol use: No    Drug use: No     Types: Prescription       Medications/Allergies:  Current Outpatient Medications on File Prior to Visit   Medication Sig Dispense Refill    traMADol (ULTRAM) 50 mg tablet Take 50 mg by mouth every six (6) hours as needed.  ketoconazole (NIZORAL) 2 % topical cream Apply 1 Applicator to affected area daily.  acetaminophen (TYLENOL) 650 mg TbER Take 1,300 mg by mouth two (2) times a day.  cyanocobalamin 1,000 mcg tablet Take 1 Tab by mouth daily.  sertraline (ZOLOFT) 100 mg tablet Take 1 Tab by mouth daily. 90 Tab 1    loratadine 10 mg cap Take 10 mg by mouth daily.  ibuprofen (MOTRIN) 200 mg tablet Take 200 mg by mouth every eight (8) hours as needed for Pain.  simethicone (GAS RELIEF) 125 mg capsule Take 125 mg by mouth four (4) times daily as needed for Flatulence.  fluticasone (24 HOUR ALLERGY RELIEF) 50 mcg/actuation nasal spray 2 Sprays by Both Nostrils route daily.       dicyclomine (BENTYL) 10 mg capsule TAKE 1 CAPSULE THREE TIMES DAILY AS NEEDED 90 Cap 0    oxybutynin chloride XL (DITROPAN XL) 10 mg CR tablet Take 1 Tab by mouth daily. Indications: Needing to Urinate Immediately 30 Tab 0     No current facility-administered medications on file prior to visit. Allergies   Allergen Reactions    Other Medication Anaphylaxis     MRI Dye    Ciprofloxacin Anxiety     Whole body starts shaking    Epinephrine Palpitations    Ivp [Fd And C Blue No.1] Swelling     Facial swelling many years ago, now only has oral contrast    Macrobid [Nitrofurantoin Monohyd/M-Cryst] Other (comments)     Whole body starts shaking       Other Medication Palpitations    Prednisone Nausea and Vomiting       Objective:  Visit Vitals  /75 (BP 1 Location: Right arm, BP Patient Position: Sitting)   Pulse 65   Temp 98.6 °F (37 °C) (Oral)   Resp 18   Ht 5' 3\" (1.6 m)   Wt 151 lb 9.6 oz (68.8 kg)   SpO2 95%   BMI 26.85 kg/m²      Constitutional: Well developed, nourished, no distress, alert   CV: S1, S2.  RRR. No murmurs/rubs. No edema. Pulm: No abnormalities on inspection. Clear to auscultation bilaterally. No wheezing/rhonchi. Normal effort. GI: Soft, mildly tender diffusely, nondistended. Normal active bowel sounds. Surgical wounds healing well. Psych: Appropriate affect, judgement and insight. Short-term memory intact.

## 2019-07-30 ENCOUNTER — PATIENT OUTREACH (OUTPATIENT)
Dept: FAMILY MEDICINE CLINIC | Age: 84
End: 2019-07-30

## 2019-07-30 ENCOUNTER — TELEPHONE (OUTPATIENT)
Dept: FAMILY MEDICINE CLINIC | Age: 84
End: 2019-07-30

## 2019-07-30 NOTE — TELEPHONE ENCOUNTER
Pt called asking what is the medication she got in the mail. This is sertraline 100 mg. She wanted to know what it was and what it was for? This was ordered for her 5/30/19. I called pt [phone] left msg with details. Pt called back, understands about the med now, asked that we send her psych referral to a new company.

## 2019-07-30 NOTE — PROGRESS NOTES
Transition of Care Follow Up  Goals Addressed                 This Visit's Progress     Attends follow-up appointments as directed. On track     Patient will keep DAVID f/u appointment for 7/29 @ 2:00 PM        Patient was seen by pcp on 7/29/19.

## 2019-08-01 NOTE — TELEPHONE ENCOUNTER
[facility] cannot see pt due to insurance. [facility] can see pt Jan 2020. [facility] can see pt Sept/Oct 2019, faxing referral there. Updated pt that I was still working on getting her seen sooner.

## 2019-08-01 NOTE — TELEPHONE ENCOUNTER
Referral now sent to [facility]. They accept her ins and will try to schedule in 30 days.

## 2019-08-12 ENCOUNTER — TELEPHONE (OUTPATIENT)
Dept: FAMILY MEDICINE CLINIC | Age: 84
End: 2019-08-12

## 2019-08-12 NOTE — TELEPHONE ENCOUNTER
Pt's surgeon's office saw her today for 2 week post-op visit. Pt complained of dizziness in office. Her first bp was 140/100. After a few minutes, her bp was 140/72. Pt still felt dizzy so they wanted to call us. Pt was seen 7/29/19 for DAVID. Offered appt for pt later in the week, no appts left for today. Dr's staff said they will offer pt urgent care but not to set the appt for pt at this time.

## 2019-08-16 ENCOUNTER — OFFICE VISIT (OUTPATIENT)
Dept: FAMILY MEDICINE CLINIC | Age: 84
End: 2019-08-16

## 2019-08-16 ENCOUNTER — HOSPITAL ENCOUNTER (OUTPATIENT)
Dept: LAB | Age: 84
Discharge: HOME | End: 2019-08-16
Payer: MEDICARE

## 2019-08-16 VITALS
HEIGHT: 63 IN | RESPIRATION RATE: 18 BRPM | BODY MASS INDEX: 26.97 KG/M2 | WEIGHT: 152.2 LBS | OXYGEN SATURATION: 94 % | HEART RATE: 63 BPM | SYSTOLIC BLOOD PRESSURE: 140 MMHG | TEMPERATURE: 97.2 F | DIASTOLIC BLOOD PRESSURE: 70 MMHG

## 2019-08-16 DIAGNOSIS — R39.9 UTI SYMPTOMS: ICD-10-CM

## 2019-08-16 DIAGNOSIS — R42 DIZZINESS: Primary | ICD-10-CM

## 2019-08-16 DIAGNOSIS — Z23 ENCOUNTER FOR IMMUNIZATION: ICD-10-CM

## 2019-08-16 PROCEDURE — 87086 URINE CULTURE/COLONY COUNT: CPT

## 2019-08-16 NOTE — PROGRESS NOTES
Chief Complaint   Patient presents with    Dizziness    UTI       ASSESSMENT/PLAN  1. Dizziness  -unclear etiology. Need to get notes from urgent care and testing that was done. Will refer to ent for eval.  - REFERRAL TO ENT-OTOLARYNGOLOGY    2. UTI symptoms  -LE on dip only. Send for culture and treat based on results. - AMB POC URINALYSIS DIP STICK AUTO W/O MICRO  - CULTURE, URINE; Future    3. Encounter for immunization  - INFLUENZA VACCINE INACTIVATED (IIV), SUBUNIT, ADJUVANTED, IM  - ADMIN INFLUENZA VIRUS VAC    -get notes from urgent care, if pt can remember name of where she went. Push fluids, may use Pyridium OTC prn. Call or return to clinic prn if these symptoms worsen or fail to improve as anticipated. The plan was discussed with the patient. The patient verbalized understanding and is in agreement with the plan. All medication potential side effects were discussed with the patient. SUBJECTIVE: Sussy Joseph is a 80 y.o. female who complains of dizziness x several months. She states it's all the time and her head \"feels dizzy and unsteady\". Denies room spinning sensation. She is requesting urine testing b/c she wants to know if it's a UTI. No urinary frequency, urgency or dysuria. Was apparently seen in urgent care for this and \"labs were done which were normal\"- I don't have the results of that. Then she \"saw another doctor there\" who \"couldn't find anything\". Nothing makes the dizziness worse or better. No palpitations, dyspnea. Position changes doesn't make it worse. EKG last year was ok. Her sx do not prevent her from doing activity- she was able to do yoga this morning. OBJECTIVE:   Visit Vitals  /70 (BP 1 Location: Left arm, BP Patient Position: Sitting) Comment: manual   Pulse 63   Temp 97.2 °F (36.2 °C) (Oral)   Resp 18   Ht 5' 3\" (1.6 m)   Wt 152 lb 3.2 oz (69 kg)   SpO2 94%   BMI 26.96 kg/m²       Gen: Appears well, in no apparent distress.    CV: RRR  Pulm: CTA bilaterally. No wheezes/rales/crackles. : No CVA tenderness noted. Urine dipstick shows positive for leukocytes. Ent: retracted scarred TM on L. R TM ok.          Bethany Price MD

## 2019-08-16 NOTE — PROGRESS NOTES
Solis Gillis is a 80 y.o. female (: 3/13/1931) presenting to address:    Chief Complaint   Patient presents with    Dizziness    UTI       Vitals:    19 1340 19 1347   BP: 156/71 140/70   Pulse: 63    Resp: 18    Temp: 97.2 °F (36.2 °C)    TempSrc: Oral    SpO2: 94%    Weight: 152 lb 3.2 oz (69 kg)    Height: 5' 3\" (1.6 m)    PainSc:   0 - No pain        Learning Assessment:     Learning Assessment 2015   PRIMARY LEARNER Patient   HIGHEST LEVEL OF EDUCATION - PRIMARY LEARNER  SOME COLLEGE   BARRIERS PRIMARY LEARNER HEARING   CO-LEARNER CAREGIVER No   PRIMARY LANGUAGE ENGLISH    NEED -   LEARNER PREFERENCE PRIMARY READING   LEARNING SPECIAL TOPICS -   ANSWERED BY self   RELATIONSHIP SELF     Depression Screening:     3 most recent PHQ Screens 2/15/2019   PHQ Not Done Active Diagnosis of Depression or Bipolar Disorder   Little interest or pleasure in doing things -   Feeling down, depressed, irritable, or hopeless -   Total Score PHQ 2 -     Fall Risk Assessment:     Fall Risk Assessment, last 12 mths 2019   Able to walk? Yes   Fall in past 12 months? No   Fall with injury? -   Number of falls in past 12 months -   Fall Risk Score -     Abuse Screening:     Abuse Screening Questionnaire 2019   Do you ever feel afraid of your partner? N   Are you in a relationship with someone who physically or mentally threatens you? N   Is it safe for you to go home? Y     Coordination of Care Questionaire:   1. Have you been to the ER, urgent care clinic since your last visit? Hospitalized since your last visit? YES-     2. Have you seen or consulted any other health care providers outside of the [address] [place] [address] since your last visit? Include any pap smears or colon screening. NO    Advanced Directive:   1. Do you have an Advanced Directive? YES    2. Would you like information on Advanced Directives?  NO

## 2019-08-16 NOTE — PROGRESS NOTES
Immunization/s administered 8/16/2019 by Jenelle Mcmullen LPN with guardian's consent. Patient tolerated procedure well. No reactions noted. FLUAD left deltoid    Daughter Marek Harris came on the phone during the visit and gave information that pt was seen at [facility] by Dr Bobby Castro and had bp of 158 70. Pt signed release in office to request these records.    [address] [place]

## 2019-08-18 LAB
BACTERIA SPEC CULT: NORMAL
SERVICE CMNT-IMP: NORMAL

## 2019-08-26 ENCOUNTER — TELEPHONE (OUTPATIENT)
Dept: FAMILY MEDICINE CLINIC | Age: 84
End: 2019-08-26

## 2019-08-26 RX ORDER — SERTRALINE HYDROCHLORIDE 100 MG/1
100 TABLET, FILM COATED ORAL DAILY
Qty: 90 TAB | Refills: 3 | Status: SHIPPED | OUTPATIENT
Start: 2019-08-26 | End: 2019-08-28 | Stop reason: SDUPTHER

## 2019-08-26 NOTE — TELEPHONE ENCOUNTER
Pt called stating pcp took her off zoloft and she's feeling depressed and wants to know if there's something else she can give her. Pt also needed reminder of ENT appt 8/30/19. I gave her the details and address too.

## 2019-08-26 NOTE — TELEPHONE ENCOUNTER
Called pt's mobile. Reached daughter. Daughter said she talked with pt about stopping zoloft. She told her she should taper off of it but she doesn't think pt did that. I called pt on local 282 phone, reached . Asked pt to call back.

## 2019-08-26 NOTE — TELEPHONE ENCOUNTER
I didn't take her off of it. At last visit, she reported she was no longer taking it.   If she feels she needs something, I would recommend restarting zoloft, which I sent to pharmacy

## 2019-08-27 NOTE — TELEPHONE ENCOUNTER
Left another message for pt to call back. Pt can restart the zoloft, Dr Romy Rider sent it back in for her. Pt had stopped taking it on her own.

## 2019-08-29 RX ORDER — SERTRALINE HYDROCHLORIDE 100 MG/1
100 TABLET, FILM COATED ORAL DAILY
Qty: 30 TAB | Refills: 0 | Status: SHIPPED | OUTPATIENT
Start: 2019-08-29 | End: 2020-03-05 | Stop reason: ALTCHOICE

## 2019-08-29 RX ORDER — SERTRALINE HYDROCHLORIDE 100 MG/1
100 TABLET, FILM COATED ORAL DAILY
Qty: 90 TAB | Refills: 1 | Status: SHIPPED | OUTPATIENT
Start: 2019-08-29 | End: 2019-08-29 | Stop reason: SDUPTHER

## 2019-09-19 ENCOUNTER — TELEPHONE (OUTPATIENT)
Dept: FAMILY MEDICINE CLINIC | Age: 84
End: 2019-09-19

## 2019-09-19 NOTE — TELEPHONE ENCOUNTER
Pt called asking for our help with her hospital bill? She said they are billing her for a private room and to contact her doctor. I returned pt call, reached vm. I left a msg asking her to call the hospital billing dept and they can help explain her bill. Our office does not do direct admits nor control what type of room she is assigned to. Pt can also reach out to her insurance.

## 2019-09-20 ENCOUNTER — TELEPHONE (OUTPATIENT)
Dept: FAMILY MEDICINE CLINIC | Age: 84
End: 2019-09-20

## 2019-09-20 NOTE — TELEPHONE ENCOUNTER
Pt called, left msg on nurse station . Asked if she needs to be seen for a gash on her arm. Says a woman hit her arm at [facility] and it's been red since 9/15/19. I called pt, no answer, phone went to . Left msg asking pt to call back to triage with nurse, let her know this would likely need evaluated by the dr but we would like a call back to book appt or she can been seen at urgent care.

## 2019-10-01 ENCOUNTER — PATIENT OUTREACH (OUTPATIENT)
Dept: FAMILY MEDICINE CLINIC | Age: 84
End: 2019-10-01

## 2019-10-02 ENCOUNTER — TELEPHONE (OUTPATIENT)
Dept: FAMILY MEDICINE CLINIC | Age: 84
End: 2019-10-02

## 2019-10-02 NOTE — TELEPHONE ENCOUNTER
Pt left msg that she is getting a bill for code [id number] and [id number] that were denied needing a referral. DOS 8/12/19. Pt was not seen here for this. I called pt to get more detailed, no answer, left msg on self identified vm for pt to call back to speak with me for more information . Typically, [employer] does not require authorization anymore, just need to see in network providers. I am unsure what I can do for pt if she went out of network. Pt to call back.

## 2019-10-07 ENCOUNTER — TELEPHONE (OUTPATIENT)
Dept: FAMILY MEDICINE CLINIC | Age: 84
End: 2019-10-07

## 2019-10-07 NOTE — TELEPHONE ENCOUNTER
Pt called asking for a podiatry referral. She states she has a 1/2\" knot on the bottom of her foot that makes it painful to walk.

## 2019-10-08 NOTE — TELEPHONE ENCOUNTER
Doesn't need referral.  She can call:    Flora Asencio  [address]  Patricio Chatterjee

## 2019-10-16 ENCOUNTER — OFFICE VISIT (OUTPATIENT)
Dept: FAMILY MEDICINE CLINIC | Age: 84
End: 2019-10-16

## 2019-10-16 VITALS
OXYGEN SATURATION: 96 % | BODY MASS INDEX: 27.29 KG/M2 | TEMPERATURE: 97.3 F | DIASTOLIC BLOOD PRESSURE: 85 MMHG | HEIGHT: 63 IN | WEIGHT: 154 LBS | SYSTOLIC BLOOD PRESSURE: 135 MMHG | RESPIRATION RATE: 16 BRPM | HEART RATE: 74 BPM

## 2019-10-16 DIAGNOSIS — F32.A DEPRESSION, UNSPECIFIED DEPRESSION TYPE: Primary | ICD-10-CM

## 2019-10-16 DIAGNOSIS — L84 CORN OF FOOT: ICD-10-CM

## 2019-10-16 DIAGNOSIS — Z65.8 PSYCHOSOCIAL STRESSORS: ICD-10-CM

## 2019-10-16 DIAGNOSIS — R42 DIZZINESS: ICD-10-CM

## 2019-10-16 PROBLEM — R41.3 MEMORY IMPAIRMENT: Status: RESOLVED | Noted: 2019-01-17 | Resolved: 2019-10-16

## 2019-10-16 PROBLEM — F01.50 VASCULAR DEMENTIA WITHOUT BEHAVIORAL DISTURBANCE (HCC): Status: ACTIVE | Noted: 2019-10-16

## 2019-10-16 RX ORDER — ARIPIPRAZOLE 2 MG/1
2 TABLET ORAL DAILY
Qty: 90 TAB | Refills: 0 | Status: SHIPPED | OUTPATIENT
Start: 2019-10-16 | End: 2019-11-20 | Stop reason: ALTCHOICE

## 2019-10-16 NOTE — PROGRESS NOTES
Chief Complaint   Patient presents with    Depression     Family issues-jids causing her to feel depreessed. Bodyaches.  Callouses     Right foot callous, referral to podiatry. 1. Have you been to the ER, urgent care clinic since your last visit? Hospitalized since your last visit? No    2. Have you seen or consulted any other health care providers outside of the [address] [place] [address] since your last visit? Include any pap smears or colon screening.  No

## 2019-10-16 NOTE — PROGRESS NOTES
Chief Complaint   Patient presents with    Depression     Family issues-jids causing her to feel depreessed. Bodyaches.  Callouses     Right foot callous, referral to podiatry. Assessment/Plan  1. Depression, unspecified depression type and psychosocial stressors  Add abilify. F/u in 1mo. - ARIPiprazole (ABILIFY) 2 mg tablet; Take 1 Tab by mouth daily. Indications: Additional Medications to Treat Depression  Dispense: 90 Tab; Refill: 0    2. Dizziness  -encouraged pt to complete vestibular rehab as prescribed by ENT    3. [place] of foot  -referral podiatry. The plan was discussed with the patient. The patient verbalized understanding and is in agreement with the plan. All medication potential side effects were discussed with the patient. SUBJECTIVE:   Alfredo Davis is a 80 y.o. female who complains of ongoing dizziness. ENT referred her to vestibular rehab, which she hasn't started b/c of cost.     She has a long-hx of depression. She hasn't tolerated numerous meds. She is seeing a counselor. We have never been able to gain control of her depressive sx. She has ongoing issues/poor relations with her children. She also has a callus on foot that bothers her. Wants to go to podiatry. Review of Systems - ENT ROS: positive for - vertigo  Respiratory ROS: no cough, shortness of breath, or wheezing  Cardiovascular ROS: no chest pain or dyspnea on exertion  Gastrointestinal ROS: no abdominal pain, change in bowel habits, or black or bloody stools  Musculoskeletal ROS: negative  Neurological ROS: negative    Physical Examination:   Visit Vitals  /85 (BP 1 Location: Right arm, BP Patient Position: Sitting)   Pulse 74   Temp 97.3 °F (36.3 °C) (Oral)   Resp 16   Ht 5' 3\" (1.6 m)   Wt 154 lb (69.9 kg)   SpO2 96%   BMI 27.28 kg/m²       Constitutional: Well developed, nourished, no distress, alert   HENT: Exterior ears and tympanic membranes normal bilaterally. Supple neck.  No thyromegaly or lymphadenopathy. Oropharynx clear and moist mucous membranes. Eyes: Conjunctiva normal. PERRL. CV: S1, S2.  RRR. No murmurs/rubs. Pulm: No abnormalities on inspection. Clear to auscultation bilaterally. No wheezing/rhonchi. Normal effort.              Tuan Florez MD

## 2019-10-16 NOTE — PATIENT INSTRUCTIONS
[facility]  Dr. Geraldo Bazzi  [address]

## 2019-11-20 ENCOUNTER — HOSPITAL ENCOUNTER (OUTPATIENT)
Dept: LAB | Age: 84
Discharge: HOME | End: 2019-11-20
Payer: MEDICARE

## 2019-11-20 ENCOUNTER — OFFICE VISIT (OUTPATIENT)
Dept: FAMILY MEDICINE CLINIC | Age: 84
End: 2019-11-20

## 2019-11-20 VITALS
RESPIRATION RATE: 16 BRPM | OXYGEN SATURATION: 95 % | DIASTOLIC BLOOD PRESSURE: 65 MMHG | TEMPERATURE: 98 F | HEART RATE: 83 BPM | SYSTOLIC BLOOD PRESSURE: 98 MMHG | WEIGHT: 153.2 LBS | HEIGHT: 63 IN | BODY MASS INDEX: 27.14 KG/M2

## 2019-11-20 DIAGNOSIS — R35.0 URINARY FREQUENCY: Primary | ICD-10-CM

## 2019-11-20 DIAGNOSIS — F32.A DEPRESSION, UNSPECIFIED DEPRESSION TYPE: ICD-10-CM

## 2019-11-20 DIAGNOSIS — R53.82 CHRONIC FATIGUE: ICD-10-CM

## 2019-11-20 LAB
BILIRUB UR QL STRIP: ABNORMAL
GLUCOSE UR-MCNC: NEGATIVE MG/DL
KETONES P FAST UR STRIP-MCNC: NEGATIVE MG/DL
PH UR STRIP: 5 [PH] (ref 4.6–8)
PROT UR QL STRIP: ABNORMAL
SP GR UR STRIP: 1.03 (ref 1–1.03)
UA UROBILINOGEN AMB POC: ABNORMAL (ref 0.2–1)
URINALYSIS CLARITY POC: CLEAR
URINALYSIS COLOR POC: ABNORMAL
URINE BLOOD POC: NEGATIVE
URINE LEUKOCYTES POC: ABNORMAL
URINE NITRITES POC: NEGATIVE

## 2019-11-20 PROCEDURE — 36415 COLL VENOUS BLD VENIPUNCTURE: CPT

## 2019-11-20 PROCEDURE — 87086 URINE CULTURE/COLONY COUNT: CPT

## 2019-11-20 RX ORDER — LANOLIN ALCOHOL/MO/W.PET/CERES
500 CREAM (GRAM) TOPICAL DAILY
COMMUNITY
End: 2020-06-02 | Stop reason: SDUPTHER

## 2019-11-20 NOTE — PROGRESS NOTES
Hanna Litten is a 80 y.o. female (: 3/13/1931) presenting to address:    Chief Complaint   Patient presents with    Fatigue     Doesn't feel entergetic       Vitals:    19 0856   BP: 98/65   Pulse: 83   Resp: 16   Temp: 98 °F (36.7 °C)   TempSrc: Oral   SpO2: 95%   Weight: 153 lb 3.2 oz (69.5 kg)   Height: 5' 3\" (1.6 m)       Hearing/Vision:   No exam data present    Learning Assessment:     Learning Assessment 2015   PRIMARY LEARNER Patient   HIGHEST LEVEL OF EDUCATION - PRIMARY LEARNER  SOME COLLEGE   BARRIERS PRIMARY LEARNER HEARING   CO-LEARNER CAREGIVER No   PRIMARY LANGUAGE ENGLISH    NEED -   LEARNER PREFERENCE PRIMARY READING   LEARNING SPECIAL TOPICS -   ANSWERED BY self   RELATIONSHIP SELF     Depression Screening:     3 most recent PHQ Screens 2/15/2019   PHQ Not Done Active Diagnosis of Depression or Bipolar Disorder   Little interest or pleasure in doing things -   Feeling down, depressed, irritable, or hopeless -   Total Score PHQ 2 -     Fall Risk Assessment:     Fall Risk Assessment, last 12 mths 2019   Able to walk? Yes   Fall in past 12 months? No   Fall with injury? -   Number of falls in past 12 months -   Fall Risk Score -     Abuse Screening:     Abuse Screening Questionnaire 2019   Do you ever feel afraid of your partner? N   Are you in a relationship with someone who physically or mentally threatens you? N   Is it safe for you to go home? Y     Coordination of Care Questionaire:   1. Have you been to the ER, urgent care clinic since your last visit? Hospitalized since your last visit? NO    2. Have you seen or consulted any other health care providers outside of the [address] [place] [address] since your last visit? Include any pap smears or colon screening. NO    Advanced Directive:   1. Do you have an Advanced Directive? YES    2. Would you like information on Advanced Directives?  NO

## 2019-11-20 NOTE — PROGRESS NOTES
Assessment/Plan:    1. Urinary frequency  -send for culture  - AMB POC URINALYSIS DIP STICK AUTO W/O MICRO  - CULTURE, URINE; Future    2. Chronic fatigue  - CBC WITH AUTOMATED DIFF; Future    3. Depression, unspecified depression type  -discussed importance of taking zoloft as prescribed. The plan was discussed with the patient. The patient verbalized understanding and is in agreement with the plan. All medication potential side effects were discussed with the patient. Health Maintenance:   Health Maintenance   Topic Date Due    MEDICARE YEARLY EXAM  11/13/2019    Shingrix Vaccine Age 50> (1 of 2) 10/30/2020 (Originally 3/13/1981)    Pneumococcal 65+ years (2 of 2 - PPSV23) 06/27/2020    GLAUCOMA SCREENING Q2Y  12/29/2020    DTaP/Tdap/Td series (2 - Td) 09/01/2026    Bone Densitometry (Dexa) Screening  Completed    Influenza Age 5 to Adult  Completed     Fior Nettles is a 80 y.o. female and presents with Fatigue (Doesn't feel entergetic)     Subjective:  She c/o fatigue, long-standing depression, dizziness and urinary frequency. She has h/o vertigo - referred to Dr. Kirk Toro and had appt 8/30. But pt doesn't even know if she saw him or not (I don't have any notes on file). tsh nml 6/2019. She's only intermittently compliant with her zoloft. She's also not sleeping well and hasn't been compliant with her cpap. She has very poor family support and tumultuous relationship with her children. ROS:  Constitutional: No recent weight change. No weakness/+fatigue. No f/c. HENT: No HA, +dizziness. No hearing loss/tinnitus. No nasal congestion/discharge. Eyes: No change in vision, double/blurred vision or eye pain/redness. Cardiovascular: No CP/palpitations. No RAYA/orthopnea/PND. Respiratory: No cough/sputum, dyspnea, wheezing. Gastointestinal: No dysphagia, reflux. No n/v. No constipation/diarrhea. No melena/rectal bleeding.    Genitourinary: No dysuria, urinary hesitancy, nocturia, hematuria. No incontinence. Musculoskeletal: + joint pain/stiffness. No muscle pain/tenderness. The problem list was updated as a part of today's visit. Patient Active Problem List   Diagnosis Code    Depression F32.9    HLD (hyperlipidemia) E78.5    Osteopenia M85.80    Diverticulosis K57.90    Allergic rhinitis J30.9    Hearing loss of both ears H91.93    OA (osteoarthritis) M19.90    PND (post-nasal drip) R09.82    Anxiety F41.9    Nephrolithiasis N20.0    IBS (irritable bowel syndrome) K58.9    Cerebral microvascular disease I67.9    CKD (chronic kidney disease) stage 3, GFR 30-59 ml/min ([facility]) N18.3    DNR (do not resuscitate) Z73    ESTHER (obstructive sleep apnea) G47.33    Chronic right shoulder pain M25.511, G89.29    Stress due to family tension Z63.8    Psychosocial stressors Z65.8    OAB (overactive bladder) N32.81    Calculus of gallbladder without cholecystitis without obstruction K80.20    B12 deficiency E53.8    Hx-TIA (transient ischemic attack) Z86.73    Small bowel obstruction, partial (HCC) K56.600    Vascular dementia without behavioral disturbance (HCC) F01.50       The PSH, FH were reviewed. SH:  Social History     Tobacco Use    Smoking status: Never Smoker    Smokeless tobacco: Never Used   Substance Use Topics    Alcohol use: No    Drug use: No     Types: Prescription       Medications/Allergies:  Current Outpatient Medications on File Prior to Visit   Medication Sig Dispense Refill    cyanocobalamin (VITAMIN B12) 500 mcg tablet Take 500 mcg by mouth daily.  sertraline (ZOLOFT) 100 mg tablet Take 1 Tab by mouth daily. 30 Tab 0    acetaminophen (TYLENOL) 650 mg TbER Take 1,300 mg by mouth two (2) times a day.  simethicone (GAS RELIEF) 125 mg capsule Take 125 mg by mouth four (4) times daily as needed for Flatulence.  fluticasone (24 HOUR ALLERGY RELIEF) 50 mcg/actuation nasal spray 2 Sprays by Both Nostrils route daily.        No current facility-administered medications on file prior to visit. Allergies   Allergen Reactions    Other Medication Anaphylaxis     MRI Dye    Ciprofloxacin Anxiety     Whole body starts shaking    Epinephrine Palpitations    Ivp [Fd And C Blue No.1] Swelling     Facial swelling many years ago, now only has oral contrast    Macrobid [Nitrofurantoin Monohyd/M-Cryst] Other (comments)     Whole body starts shaking       Other Medication Palpitations    Prednisone Nausea and Vomiting       Objective:  Visit Vitals  BP 98/65 (BP 1 Location: Left arm, BP Patient Position: Sitting)   Pulse 83   Temp 98 °F (36.7 °C) (Oral)   Resp 16   Ht 5' 3\" (1.6 m)   Wt 153 lb 3.2 oz (69.5 kg)   SpO2 95%   BMI 27.14 kg/m²      Constitutional: Well developed, nourished, no distress, alert   Eyes: Conjunctiva normal. PERRL. Eyelids normal.   CV: S1, S2.  RRR. No murmurs/rubs. No edema. Pulm: No abnormalities on inspection. Clear to auscultation bilaterally. No wheezing/rhonchi. Normal effort. Psych: Appropriate affect, judgement and insight. Short-term memory intact.

## 2019-11-20 NOTE — PATIENT INSTRUCTIONS
Depression and Chronic Disease: Care Instructions Your Care Instructions A chronic disease is one that you have for a long time. Some chronic diseases can be controlled, but they usually cannot be cured. Depression is common in people with chronic diseases, but it often goes unnoticed. Many people have concerns about seeking treatment for a mental health problem. You may think it's a sign of weakness, or you don't want people to know about it. It's important to overcome these reasons for not seeking treatment. Treating depression or anxiety is good for your health. Follow-up care is a key part of your treatment and safety. Be sure to make and go to all appointments, and call your doctor if you are having problems. It's also a good idea to know your test results and keep a list of the medicines you take. How can you care for yourself at home? Watch for symptoms of depression The symptoms of depression are often subtle at first. You may think they are caused by your disease rather than depression. Or you may think it is normal to be depressed when you have a chronic disease. If you are depressed you may: · Feel sad or hopeless. · Feel guilty or worthless. · Not enjoy the things you used to enjoy. · Feel hopeless, as though life is not worth living. · Have trouble thinking or remembering. · Have low energy, and you may not eat or sleep well. · Pull away from others. · Think often about death or killing yourself. (Keep the numbers for these national suicide hotlines: 5-714-469-TALK [1-997.841.7375] and 7-165-NLDGAYS [1-777.973.6930]. ) Get treatment By treating your depression, you can feel more hopeful and have more energy. If you feel better, you may take better care of yourself, so your health may improve. · Talk to your doctor if you have any changes in mood during treatment for your disease. · Ask your doctor for help.  Counseling, antidepressant medicine, or a combination of the two can help most people with depression. Often a combination works best. Counseling can also help you cope with having a chronic disease. When should you call for help? Call 911 anytime you think you may need emergency care. For example, call if: 
  · You feel like hurting yourself or someone else.  
  · Someone you know has depression and is about to attempt or is attempting suicide.  
[facility] your doctor now or seek immediate medical care if: 
  · You hear voices.  
  · Someone you know has depression and: 
? Starts to give away his or her possessions. ? Uses illegal drugs or drinks alcohol heavily. ? Talks or writes about death, including writing suicide notes or talking about guns, knives, or pills. ? Starts to spend a lot of time alone. ? Acts very aggressively or suddenly appears calm.  
 Watch closely for changes in your health, and be sure to contact your doctor if: 
  · You do not get better as expected. Where can you learn more? Go to http://ward-vin.info/. Enter J616 in the search box to learn more about \"Depression and Chronic Disease: Care Instructions. \" Current as of: May 28, 2019 Content Version: 12.2 © 7867-2895 [employer]. Care instructions adapted under license by [employer] (which disclaims liability or warranty for this information). If you have questions about a medical condition or this instruction, always ask your healthcare professional. [place] [address] any warranty or liability for your use of this information.

## 2019-11-22 ENCOUNTER — TELEPHONE (OUTPATIENT)
Dept: FAMILY MEDICINE CLINIC | Age: 84
End: 2019-11-22

## 2019-11-22 DIAGNOSIS — R42 VERTIGO: Primary | ICD-10-CM

## 2019-11-22 LAB
BACTERIA SPEC CULT: NORMAL
SERVICE CMNT-IMP: NORMAL

## 2019-11-22 NOTE — TELEPHONE ENCOUNTER
Tell pt I got ENT note (Dr. Dipti Painting). He had recommended vestibular rehab. I don't believe she did that. I can put in referral to [facility] or she can contact his office to set that up. But this will treat her dizziness.

## 2019-12-11 ENCOUNTER — TELEPHONE (OUTPATIENT)
Dept: FAMILY MEDICINE CLINIC | Age: 84
End: 2019-12-11

## 2019-12-11 NOTE — TELEPHONE ENCOUNTER
Patient called stating that she had arthritis pain in her right shoulder. She wanted to know if Laurance Angelucci thought she needed to be prescribed some medication for it or if  wanted her to come in to be seen first. Please advise.

## 2019-12-12 NOTE — TELEPHONE ENCOUNTER
Returned pt call . Pt was taking 325 mg Tylenol bid. Pt has Tylenol Arthritis strength listed as a historical med. 650 mg, 2 tabs twice per day. Pt said she has 500 mg in the cabinet but not 650 mg. I explained that she can take 2 tabs of 500 mg bid. She will try that strength and let us know if it seems to help. Reviewed with pcp.

## 2019-12-17 ENCOUNTER — HOSPITAL ENCOUNTER (OUTPATIENT)
Dept: PHYSICAL THERAPY | Age: 84
Discharge: HOME | End: 2019-12-17
Payer: MEDICARE

## 2019-12-17 ENCOUNTER — TELEPHONE (OUTPATIENT)
Dept: FAMILY MEDICINE CLINIC | Age: 84
End: 2019-12-17

## 2019-12-17 PROCEDURE — 97112 NEUROMUSCULAR REEDUCATION: CPT

## 2019-12-17 PROCEDURE — 97162 PT EVAL MOD COMPLEX 30 MIN: CPT

## 2019-12-17 NOTE — PROGRESS NOTES
PHYSICAL THERAPY - DAILY TREATMENT NOTE    Patient Name: Emely Bound        Date: 2019  : 3/13/1931   Yes Patient  Verified  Visit #:   1   of   8  Insurance: Payor: Jenny Gaxiola / Plan: [address] [place] [address] HMO / Product Type: Managed Care Medicare /      In time: 12:12 Out time: 1:07   Total Treatment Time: 55     Medicare/BCBS Time Tracking (below)   Total Timed Codes (min):  12 1:1 Treatment Time:  12     TREATMENT AREA =  Vertigo [R42]    SUBJECTIVE  Pain Level (on 0 to 10 scale):  0  / 10   Medication Changes/New allergies or changes in medical history, any new surgeries or procedures?    no  If yes, update Summary List   Subjective Functional Status/Changes:  []  No changes reported     See POC         OBJECTIVE  Modalities Rationale:   n/a    12 min Neuromuscular Re-ed: [x]  See flow sheet   Rationale:    improve balance and increase proprioception to improve the patients ability to perform functional mobility with safety    Billed With/As:   [x] TE   [] TA   [] Neuro   [] Self Care Patient Education: [x] Review HEP    [x] Progressed/Changed HEP based on:   [x] positioning   [x] body mechanics   [] transfers   [] heat/ice application    [] other:      Other Objective/Functional Measures:    See POC     Post Treatment Pain Level (on 0 to 10) scale:   0  / 10     ASSESSMENT  Assessment/Changes in Function:     See POC     []  See Progress Note/Recertification   Patient will continue to benefit from skilled PT services to modify and progress therapeutic interventions, address functional mobility deficits, address ROM deficits, address strength deficits, analyze and address soft tissue restrictions, analyze and cue movement patterns, analyze and modify body mechanics/ergonomics, assess and modify postural abnormalities and instruct in home and community integration to attain remaining goals.    Progress toward goals / Updated goals:    See newly established goals in POC     PLAN  [x] Upgrade activities as tolerated yes Continue plan of care   []  Discharge due to :    []  Other:      Therapist: Emi Saucedo    Date: 12/17/2019 Time: 5:22 PM     No future appointments.

## 2019-12-17 NOTE — TELEPHONE ENCOUNTER
Pt is supposed to be seen for vest rehab to help with her dizziness. Pt has an orthopedist or she can be seen for her shoulder with pcp to [facility]. Referral is not for shoulder.

## 2019-12-17 NOTE — PROGRESS NOTES
[address] PHYSICAL THERAPY  [address] [place] [address] [address], [address],[facility], [facility] - Phone: [phone]  Fax: [phone] / [phone] [facility]  Patient Name: Cash Pearson : 3/13/1931   Medical   Diagnosis: Vertigo [R42] Treatment Diagnosis: Vertigo [R42]   Onset Date: >1 year ago     Referral Source: Nataly Hunter MD [facility]): 2019   Prior Hospitalization: See medical history Provider #: [id number]   Prior Level of Function: Chronic h/o dizziness   Comorbidities: Depression and Arthritis   Medications: Verified on Patient Summary List   The Plan of Care and following information is based on the information from the initial evaluation.   ===========================================================================================  Assessment / key information: Patient is 80 y.o. female who [facility] to [facility] [place], [facility]. with diagnosis of Vertigo [R42]. Patient reports chronic h/o imbalance, dizziness, and impaired gait. Symptoms include unsteadiness,dizziness, and light-headedness. Dizziness symptoms increase with transferring from sit to stand and quick head movement. Patient reports hx of falls. In addition R shoulder pain began about 1.5 years ago after tripping on a rug and falling forward on right UE. R shoulder pain increases with turning over in bed, reaching overhead, transferring supine to sit, and donning and doffing coat. Upon objective evaluation of the shoulder, patient demonstrates grossly impaired and painful AROM/PROM of right shoulder, grossly impaired right RTC strength, tenderness to palpation to teres major/minor, pec major, and UT, impaired use of vestibular input, decreased use hip balance strategies, grossly impaired C/S AROM, path deviations and decreased safety with gait, and impaired static and dynamic standing balance.  Patient scored 15 on DGI indicating increased risk of falls with ambulation. Patient scored 42/100 (moderate handicap) on Dizziness Handicap Inventory indicating decreased quality of life and impaired functional mobility secondary to dizziness. Patient can benefit from PT interventions to decrease r/o fall, improve safety with ADLs, & decrease sx with ADLs & to improve overall functional status.  ===========================================================================================  Eval Complexity: History MEDIUM  Complexity : 1-2 comorbidities / personal factors will impact the outcome/ POC ;  Examination  HIGH Complexity : 4+ Standardized tests and measures addressing body structure, function, activity limitation and / or participation in recreation ; Presentation MEDIUM Complexity : Evolving with changing characteristics ; Decision Making Other outcome measures DHI  MEDIUM; Overall Complexity MEDIUM  Problem List: decrease ROM, decrease strength, impaired gait/ balance, decrease ADL/ functional abilitiies, decrease activity tolerance, decrease flexibility/ joint mobility, decrease transfer abilities and other dizziness affecting functional mobility    Treatment Plan may include any combination of the following: Therapeutic exercise, Therapeutic activities, Neuromuscular re-education, Physical agent/modality, Gait/balance training, Manual therapy, Patient education, Self Care training, Functional mobility training, Home safety training and Stair training  Patient / Family readiness to learn indicated by: asking questions, trying to perform skills and interest  Persons(s) to be included in education: patient (P)  Barriers to Learning/Limitations: yes;  sensory deficits-vision/hearing/speech  Measures taken, if barriers to learning:    Patient Goal (s): \"less pain when moving arm and putting on clothes\"   Patient self reported health status: excellent  Rehabilitation Potential: good   Short Term Goals:  To be accomplished in  2  weeks:  1) Patient performing daily HEP and educated in fall prevention techniques. 2) Patient will be able to maintain MSR heel to GT for >/=20 seconds eyes open to increase safety with standing in narrow spaces.  Long Term Goals: To be accomplished in  6  weeks:  1) Patient to be independent with HEP and instructed in vestibular taper to ensure safety and decreased risk of falls following discharge. 2)  Patient will be able to maintain MSR heel to bunion for 30 seconds eyes closed to increase safety with showering. 3) Patient to improve score on DGI to 18/24 indicating decreased fall risk with ambulation. 4)  Increase right Sh PROM in flexion and scaption to >/=130 deg to facilitate improved ease with dressing. Frequency / Duration:   Patient to be seen  1-2  times per week for 6-8  weeks:  Patient / Caregiver education and instruction: self care, activity modification and exercises  Therapist Signature: Noemí Franklin Date: 88/95/5437   Certification Period: 12/17/2019 to 3/16/2019 Time: 12:26 PM   ===========================================================================================  I certify that the above Physical Therapy Services are being furnished while the patient is under my care. I agree with the treatment plan and certify that this therapy is necessary. Physician Signature:        Date:       Time:     Please sign and return to [facility] at [facility], [facility]. or you may fax the signed copy to [phone]. Thank you.

## 2019-12-17 NOTE — TELEPHONE ENCOUNTER
[facility] called stating that the patient was in the office to be seen for her shoulder but the referral from us states that it is for vertigo. Office called while nurse was at lunch. They want a call back as soon as possible. Please advise.

## 2019-12-17 NOTE — TELEPHONE ENCOUNTER
Called [employer] [facility] back and cleared up the confusion about referral. Rome Sweeney from the office said that the patient had just gotten confused.

## 2019-12-31 ENCOUNTER — TELEPHONE (OUTPATIENT)
Dept: FAMILY MEDICINE CLINIC | Age: 84
End: 2019-12-31

## 2019-12-31 NOTE — TELEPHONE ENCOUNTER
Patient called wanting a referral to get a shot in her right shoulder due to arthritis pain.  Please advise

## 2020-01-02 NOTE — TELEPHONE ENCOUNTER
Please give her contact info for [employer] [facility], I think she has seen Anne Garcia before.  No referral needed

## 2020-01-08 NOTE — TELEPHONE ENCOUNTER
Have tried to call pt several times. Her phone listed as contact is relative not her. Her latest phone number is disconnected. Pt left msg saying she has a new [employer] phone and she thinks the number is [phone]. Called that number, no answer, phone just rings and rings with no vm .

## 2020-01-09 NOTE — TELEPHONE ENCOUNTER
Called pt on her new number again, no answer, no vm. Called daughter in [place], left msg asking her to call me back to confirm a new phone number for patient.

## 2020-01-09 NOTE — TELEPHONE ENCOUNTER
Daughter returned call, verified pt's number is correct. Pt lost her other phone recently. I gave daughter [facility] number so pt can call them to schedule shoulder eval. She said she believes pt has had cortisone from them before.

## 2020-02-05 NOTE — PROGRESS NOTES
[address] PHYSICAL THERAPY  [address] [place] [address] [address],Chandni Loco, [address] [facility] - Phone: [phone]  Fax: [phone]  DISCHARGE SUMMARY FOR PHYSICAL THERAPY          Patient Name: Denise Olmstead : 3/13/1931   Treatment/Medical Diagnosis: Vertigo [R42]   Onset Date: >1 year ago    Referral Source: Anita Gonzales MD [facility]): 2019   Prior Hospitalization: See Medical History Provider #: [id number]   Prior Level of Function: Chronic H/O Dizziness   Comorbidities: Depression and Arthritis   Medications: Verified on Patient Summary List   Visits from [facility]: 1 Missed Visits: 0     Goal/Measure of Progress Goal Met? 1. Patient performing daily HEP and educated in fall prevention techniques. Status at last Eval: Goal Established Current Status: Unable to be assessed no   2. Patient will be able to maintain MSR heel to GT for >/=20 seconds eyes open to increase safety with standing in narrow spaces. Status at last Eval: SR EO = \" Current Status: Unable to be assessed no     Key Functional Changes/Progress:  Pt attended only initial evaluation  for treatment, therefore, a formal reassessment of goals was not performed. Assessments/Recommendations: Other: Patient did not return  If you have any questions/comments please contact us directly at  [phone]    Thank you for allowing us to assist in the care of your patient.     Therapist Signature: Elisabet Rg Date: 2019   Reporting Period:  Certification Period 2019 to 2019 to 3/16/2019   Time: 2:44 PM

## 2020-02-26 ENCOUNTER — TELEPHONE (OUTPATIENT)
Dept: FAMILY MEDICINE CLINIC | Age: 85
End: 2020-02-26

## 2020-02-26 NOTE — TELEPHONE ENCOUNTER
Pt called to schedule an appt because she is having shoulder pain and dizziness issues. She states that it is an ongoing issue. She is scheduled for the next available however she is wanting to know if she can be squeezed in before then.  Please advise    Future Appointments   Date Time Provider Adalid Asif   3/24/2020  1:00 PM Barbara Freeman MD [facility]

## 2020-03-05 ENCOUNTER — OFFICE VISIT (OUTPATIENT)
Dept: FAMILY MEDICINE CLINIC | Age: 85
End: 2020-03-05

## 2020-03-05 ENCOUNTER — TELEPHONE (OUTPATIENT)
Dept: FAMILY MEDICINE CLINIC | Age: 85
End: 2020-03-05

## 2020-03-05 VITALS
DIASTOLIC BLOOD PRESSURE: 71 MMHG | RESPIRATION RATE: 19 BRPM | HEART RATE: 68 BPM | SYSTOLIC BLOOD PRESSURE: 135 MMHG | OXYGEN SATURATION: 97 % | WEIGHT: 156.6 LBS | BODY MASS INDEX: 27.75 KG/M2 | TEMPERATURE: 98 F | HEIGHT: 63 IN

## 2020-03-05 DIAGNOSIS — G89.29 CHRONIC RIGHT SHOULDER PAIN: Primary | ICD-10-CM

## 2020-03-05 DIAGNOSIS — F01.50 VASCULAR DEMENTIA WITHOUT BEHAVIORAL DISTURBANCE (HCC): ICD-10-CM

## 2020-03-05 DIAGNOSIS — N18.30 CKD (CHRONIC KIDNEY DISEASE) STAGE 3, GFR 30-59 ML/MIN (HCC): ICD-10-CM

## 2020-03-05 DIAGNOSIS — F32.A DEPRESSION, UNSPECIFIED DEPRESSION TYPE: ICD-10-CM

## 2020-03-05 DIAGNOSIS — M25.511 CHRONIC RIGHT SHOULDER PAIN: Primary | ICD-10-CM

## 2020-03-05 DIAGNOSIS — E78.00 PURE HYPERCHOLESTEROLEMIA: ICD-10-CM

## 2020-03-05 RX ORDER — ACETAMINOPHEN 325 MG/1
TABLET ORAL
COMMUNITY
End: 2020-03-05 | Stop reason: ALTCHOICE

## 2020-03-05 RX ORDER — ACETAMINOPHEN 500 MG
TABLET ORAL
COMMUNITY

## 2020-03-05 RX ORDER — NAPROXEN 500 MG/1
500 TABLET ORAL 2 TIMES DAILY WITH MEALS
Qty: 180 TAB | Refills: 0 | Status: SHIPPED | OUTPATIENT
Start: 2020-03-05 | End: 2020-06-01 | Stop reason: SDUPTHER

## 2020-03-05 NOTE — PROGRESS NOTES
Carmel Cat is a 80 y.o. female (: 3/13/1931) presenting to address:    No chief complaint on file. Vitals:    20 0901   BP: 135/71   Pulse: 68   Resp: 19   Temp: 98 °F (36.7 °C)   TempSrc: Oral   SpO2: 97%   Weight: 156 lb 9.6 oz (71 kg)   Height: 5' 3\" (1.6 m)   PainSc:   9   PainLoc: Shoulder       Hearing/Vision:   No exam data present    Learning Assessment:     Learning Assessment 2015   PRIMARY LEARNER Patient   HIGHEST LEVEL OF EDUCATION - PRIMARY LEARNER  SOME COLLEGE   BARRIERS PRIMARY LEARNER HEARING   CO-LEARNER CAREGIVER No   PRIMARY LANGUAGE ENGLISH    NEED -   LEARNER PREFERENCE PRIMARY READING   LEARNING SPECIAL TOPICS -   ANSWERED BY self   RELATIONSHIP SELF     Depression Screening:     3 most recent PHQ Screens 2/15/2019   PHQ Not Done Active Diagnosis of Depression or Bipolar Disorder   Little interest or pleasure in doing things -   Feeling down, depressed, irritable, or hopeless -   Total Score PHQ 2 -     Fall Risk Assessment:     Fall Risk Assessment, last 12 mths 3/5/2020   Able to walk? Yes   Fall in past 12 months? No   Fall with injury? -   Number of falls in past 12 months -   Fall Risk Score -     Abuse Screening:     Abuse Screening Questionnaire 2019   Do you ever feel afraid of your partner? N   Are you in a relationship with someone who physically or mentally threatens you? N   Is it safe for you to go home? Y     Coordination of Care Questionaire:   1. Have you been to the ER, urgent care clinic since your last visit? Hospitalized since your last visit? No     2. Have you seen or consulted any other health care providers outside of the [facility] since your last visit? Include any pap smears or colon screening. No   Advanced Directive:   1. Do you have an Advanced Directive yes    2. Would you like information on Advanced Directives?  No       Health Maintenance Due   Topic Date Due    Medicare Yearly Exam  2019 Pt started a Bible class at her senior assisted living facility. States her depression is still bad but she only takes 1/2 tab of med every other day.

## 2020-03-05 NOTE — PROGRESS NOTES
Assessment/Plan:    1. Chronic right shoulder pain  -trial naprosyn. Encouraged pt to f/u with Dr. Violeta Corrales.  - naproxen (NAPROSYN) 500 mg tablet; Take 1 Tab by mouth two (2) times daily (with meals). Dispense: 180 Tab; Refill: 0    2. Depression, unspecified depression type  -noncompliant with zoloft or conseling. Discussed I don't have anything else to offer besides meds. The plan was discussed with the patient. The patient verbalized understanding and is in agreement with the plan. All medication potential side effects were discussed with the patient. Health Maintenance:   Health Maintenance   Topic Date Due    Medicare Yearly Exam  11/13/2019    Shingrix Vaccine Age 50> (1 of 2) 10/30/2020 (Originally 3/13/1981)    Pneumococcal 65+ years (2 of 2 - PPSV23) 06/27/2020    GLAUCOMA SCREENING Q2Y  12/29/2020    DTaP/Tdap/Td series (2 - Td) 09/01/2026    Bone Densitometry (Dexa) Screening  Completed    Influenza Age 5 to Adult  Completed       Michaela Garza is a 80 y.o. female and presents with No chief complaint on file. Subjective:  She states \"my shoulder is killing me\". It \"hurts all the time\". Has been seeing ortho and had injection, which didn't help. Takes tylenol 500mg qhs and 325mg in am prn. 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 She c/o ongoing depression, but she isn't taking zoloft as prescribed. She states she feels it's b/c of \"where she's living\". . However she has never been happy in the 3 different places she has lives since I have been treating her (this includes owning her own home, living near daughter in [facility], and currently in her senior living facility). She's noncompliant with zoloft (last filled 8/2019 for 30 days). She's no longer seeing counselor either. ROS:  Constitutional: No recent weight change. No weakness/fatigue. No f/c. Cardiovascular: No CP/palpitations. No RAYA/orthopnea/PND. Respiratory: No cough/sputum, dyspnea, wheezing. Gastointestinal: No dysphagia, reflux. No n/v. No constipation/diarrhea. No melena/rectal bleeding. Musculoskeletal: + joint pain/stiffness. No muscle pain/tenderness. Psychiatric:  + depression, anxiety. The problem list was updated as a part of today's visit. Patient Active Problem List   Diagnosis Code    Depression F32.9    HLD (hyperlipidemia) E78.5    Osteopenia M85.80    Diverticulosis K57.90    Allergic rhinitis J30.9    Hearing loss of both ears H91.93    OA (osteoarthritis) M19.90    PND (post-nasal drip) R09.82    Anxiety F41.9    Nephrolithiasis N20.0    IBS (irritable bowel syndrome) K58.9    Cerebral microvascular disease I67.9    CKD (chronic kidney disease) stage 3, GFR 30-59 ml/min ([facility]) N18.3    DNR (do not resuscitate) Z73    ESTHER (obstructive sleep apnea) G47.33    Chronic right shoulder pain M25.511, G89.29    Stress due to family tension Z63.8    Psychosocial stressors Z65.8    OAB (overactive bladder) N32.81    Calculus of gallbladder without cholecystitis without obstruction K80.20    B12 deficiency E53.8    Hx-TIA (transient ischemic attack) Z86.73    Small bowel obstruction, partial (HCC) K56.600    Vascular dementia without behavioral disturbance (HCC) F01.50       The PSH, FH were reviewed. SH:  Social History     Tobacco Use    Smoking status: Never Smoker    Smokeless tobacco: Never Used   Substance Use Topics    Alcohol use: No    Drug use: No     Types: Prescription       Medications/Allergies:  Current Outpatient Medications on File Prior to Visit   Medication Sig Dispense Refill    cyanocobalamin (VITAMIN B12) 500 mcg tablet Take 500 mcg by mouth daily.  sertraline (ZOLOFT) 100 mg tablet Take 1 Tab by mouth daily. 30 Tab 0    acetaminophen (TYLENOL) 650 mg TbER Take 1,300 mg by mouth two (2) times a day.  simethicone (GAS RELIEF) 125 mg capsule Take 125 mg by mouth four (4) times daily as needed for Flatulence.       fluticasone (24 HOUR ALLERGY RELIEF) 50 mcg/actuation nasal spray 2 Sprays by Both Nostrils route daily. No current facility-administered medications on file prior to visit. Allergies   Allergen Reactions    Other Medication Anaphylaxis     MRI Dye    Ciprofloxacin Anxiety     Whole body starts shaking    Epinephrine Palpitations    Ivp [Fd And C Blue No.1] Swelling     Facial swelling many years ago, now only has oral contrast    Macrobid [Nitrofurantoin Monohyd/M-Cryst] Other (comments)     Whole body starts shaking       Other Medication Palpitations    Prednisone Nausea and Vomiting       Objective:  Visit Vitals  /71 (BP 1 Location: Left arm, BP Patient Position: Sitting)   Pulse 68   Temp 98 °F (36.7 °C) (Oral)   Resp 19   Ht 5' 3\" (1.6 m)   Wt 156 lb 9.6 oz (71 kg)   SpO2 97%   BMI 27.74 kg/m²      Constitutional: Well developed, nourished, no distress, alert   CV: S1, S2.  RRR. No murmurs/rubs. No edema. Pulm: No abnormalities on inspection. Clear to auscultation bilaterally. No wheezing/rhonchi. Normal effort. Neuro: A/O x 3. No focal motor or sensory deficits. Speech normal.   Psych: Appropriate affect, judgement and insight. Short-term memory impaired.        3 most recent PHQ Screens 3/5/2020   PHQ Not Done -   Little interest or pleasure in doing things Not at all   Feeling down, depressed, irritable, or hopeless Nearly every day   Total Score PHQ 2 3   Trouble falling or staying asleep, or sleeping too much Not at all   Feeling tired or having little energy Several days   Poor appetite, weight loss, or overeating Not at all   Feeling bad about yourself - or that you are a failure or have let yourself or your family down Several days   Trouble concentrating on things such as school, work, reading, or watching TV Not at all   Moving or speaking so slowly that other people could have noticed; or the opposite being so fidgety that others notice Not at all   Thoughts of being better off dead, or hurting yourself in some way Several days   PHQ 9 Score 6   How difficult have these problems made it for you to do your work, take care of your home and get along with others Not difficult at all

## 2020-03-06 LAB
ALBUMIN SERPL-MCNC: 4.2 G/DL (ref 3.6–4.6)
ALBUMIN/GLOB SERPL: 1.8 {RATIO} (ref 1.2–2.2)
ALP SERPL-CCNC: 85 IU/L (ref 39–117)
ALT SERPL-CCNC: 14 IU/L (ref 0–32)
AST SERPL-CCNC: 15 IU/L (ref 0–40)
BILIRUB SERPL-MCNC: 0.3 MG/DL (ref 0–1.2)
BUN SERPL-MCNC: 25 MG/DL (ref 8–27)
BUN/CREAT SERPL: 26 (ref 12–28)
CALCIUM SERPL-MCNC: 9.6 MG/DL (ref 8.7–10.3)
CHLORIDE SERPL-SCNC: 105 MMOL/L (ref 96–106)
CHOLEST SERPL-MCNC: 242 MG/DL (ref 100–199)
CO2 SERPL-SCNC: 27 MMOL/L (ref 20–29)
CREAT SERPL-MCNC: 0.97 MG/DL (ref 0.57–1)
ERYTHROCYTE [DISTWIDTH] IN BLOOD BY AUTOMATED COUNT: 13.5 % (ref 11.7–15.4)
GLOBULIN SER CALC-MCNC: 2.4 G/DL (ref 1.5–4.5)
GLUCOSE SERPL-MCNC: 90 MG/DL (ref 65–99)
HCT VFR BLD AUTO: 38 % (ref 34–46.6)
HDLC SERPL-MCNC: 46 MG/DL
HGB BLD-MCNC: 13.2 G/DL (ref 11.1–15.9)
INTERPRETATION, 910389: NORMAL
INTERPRETATION: NORMAL
LDLC SERPL CALC-MCNC: 170 MG/DL (ref 0–99)
MCH RBC QN AUTO: 31.6 PG (ref 26.6–33)
MCHC RBC AUTO-ENTMCNC: 34.7 G/DL (ref 31.5–35.7)
MCV RBC AUTO: 91 FL (ref 79–97)
PDF IMAGE, 910387: NORMAL
PLATELET # BLD AUTO: 210 X10E3/UL (ref 150–450)
POTASSIUM SERPL-SCNC: 4.4 MMOL/L (ref 3.5–5.2)
PROT SERPL-MCNC: 6.6 G/DL (ref 6–8.5)
RBC # BLD AUTO: 4.18 X10E6/UL (ref 3.77–5.28)
SODIUM SERPL-SCNC: 145 MMOL/L (ref 134–144)
TRIGL SERPL-MCNC: 131 MG/DL (ref 0–149)
VLDLC SERPL CALC-MCNC: 26 MG/DL (ref 5–40)
WBC # BLD AUTO: 5.6 X10E3/UL (ref 3.4–10.8)

## 2020-03-13 ENCOUNTER — TELEPHONE (OUTPATIENT)
Dept: FAMILY MEDICINE CLINIC | Age: 85
End: 2020-03-13

## 2020-03-13 DIAGNOSIS — R35.0 URINARY FREQUENCY: Primary | ICD-10-CM

## 2020-03-13 NOTE — TELEPHONE ENCOUNTER
Pt is wondering if she has an UTI. She has been urinating more frequently. She will come by to do a urine lab for us and we will call next week with the result. pcp aware.

## 2020-03-15 LAB
APPEARANCE UR: ABNORMAL
BACTERIA UR CULT: ABNORMAL
BILIRUB UR QL STRIP: NEGATIVE
COLOR UR: YELLOW
GLUCOSE UR QL: NEGATIVE
HGB UR QL STRIP: NEGATIVE
KETONES UR QL STRIP: NEGATIVE
LEUKOCYTE ESTERASE UR QL STRIP: NEGATIVE
MICRO URNS: ABNORMAL
NITRITE UR QL STRIP: NEGATIVE
PH UR STRIP: 6.5 [PH] (ref 5–7.5)
PROT UR QL STRIP: NEGATIVE
SP GR UR: 1.02 (ref 1–1.03)
UROBILINOGEN UR STRIP-MCNC: 0.2 MG/DL (ref 0.2–1)

## 2020-03-16 RX ORDER — AMOXICILLIN AND CLAVULANATE POTASSIUM 500; 125 MG/1; MG/1
1 TABLET, FILM COATED ORAL 2 TIMES DAILY
Qty: 14 TAB | Refills: 0 | Status: SHIPPED | OUTPATIENT
Start: 2020-03-16 | End: 2020-03-23

## 2020-03-27 ENCOUNTER — TELEPHONE (OUTPATIENT)
Dept: FAMILY MEDICINE CLINIC | Age: 85
End: 2020-03-27

## 2020-03-27 NOTE — TELEPHONE ENCOUNTER
I spoke with the nurse Teresa Mckeon from [facility], he stated the hearing aid place sent patient to them because she can't here. He checked her ears out and there is no wax build up. I advised him to have patient go home and if she wants to do virtual appointment we can. He stated that wouldn't work cause she just can't hear.

## 2020-04-06 ENCOUNTER — TELEPHONE (OUTPATIENT)
Dept: FAMILY MEDICINE CLINIC | Age: 85
End: 2020-04-06

## 2020-04-06 ENCOUNTER — VIRTUAL VISIT (OUTPATIENT)
Dept: FAMILY MEDICINE CLINIC | Age: 85
End: 2020-04-06

## 2020-04-06 NOTE — TELEPHONE ENCOUNTER
I called pt back and reminded her that this has to come from the pulmonary office.  Offered her the phone number again, she said she cannot write it down the cat is laying on her chest.

## 2020-04-06 NOTE — TELEPHONE ENCOUNTER
Pt called stating that she is in need of a new cpap machine and states tht the insurance states that she needs a PA in order to get a new one.  Please advise

## 2020-04-06 NOTE — TELEPHONE ENCOUNTER
Pt couldn't connect for virtual visit. Unsure how to use text messaging. Rescheduled appt for [address] Mailed reminder to pt. Pt also asked if Dr Mikel Dean could give her a new cpap. Gave her information for Dr Rosa Alex, sleep med from 2016.

## 2020-05-06 ENCOUNTER — TELEPHONE (OUTPATIENT)
Dept: FAMILY MEDICINE CLINIC | Age: 85
End: 2020-05-06

## 2020-05-06 DIAGNOSIS — R39.9 UTI SYMPTOMS: Primary | ICD-10-CM

## 2020-05-06 NOTE — TELEPHONE ENCOUNTER
Pt is calling because she think could possibly have a uti. She stated that she is currently experiencing frequent urination but no pain.  Virtual visit was offered to pt but she is unable to do it stated she has no one that could help her

## 2020-05-08 NOTE — TELEPHONE ENCOUNTER
Called pt, reached vm immediately, offered pt visit whether video or tele. Asked if she could drop sample off. Pt to return call to set appt and lab appt.

## 2020-05-11 NOTE — TELEPHONE ENCOUNTER
Pt called leaving message at nurses station. Pt said she was going to bring us urine (has not set vv to go over this) but wanted to see about seeing the dr for her shoulder pain. Pt will need a visit. Pt has been in therapy for her shoulder pain. Called pt twice [phone] but no answer and no voicemail set up. Pt needs new labs for urine.

## 2020-05-15 ENCOUNTER — VIRTUAL VISIT (OUTPATIENT)
Dept: FAMILY MEDICINE CLINIC | Age: 85
End: 2020-05-15

## 2020-05-15 DIAGNOSIS — M19.011 PRIMARY OSTEOARTHRITIS OF RIGHT SHOULDER: ICD-10-CM

## 2020-05-15 DIAGNOSIS — R39.9 UTI SYMPTOMS: Primary | ICD-10-CM

## 2020-05-15 LAB
BILIRUB UR QL STRIP: NEGATIVE
GLUCOSE UR-MCNC: NEGATIVE MG/DL
KETONES P FAST UR STRIP-MCNC: NEGATIVE MG/DL
PH UR STRIP: 5.5 [PH] (ref 4.6–8)
PROT UR QL STRIP: NORMAL
SP GR UR STRIP: 1.03 (ref 1–1.03)
UA UROBILINOGEN AMB POC: NORMAL (ref 0.2–1)
URINALYSIS CLARITY POC: NORMAL
URINALYSIS COLOR POC: YELLOW
URINE BLOOD POC: NEGATIVE
URINE LEUKOCYTES POC: NEGATIVE
URINE NITRITES POC: NEGATIVE

## 2020-05-15 RX ORDER — AMOXICILLIN AND CLAVULANATE POTASSIUM 500; 125 MG/1; MG/1
1 TABLET, FILM COATED ORAL 2 TIMES DAILY
Qty: 14 TAB | Refills: 0 | Status: SHIPPED | OUTPATIENT
Start: 2020-05-15 | End: 2020-05-19 | Stop reason: ALTCHOICE

## 2020-05-17 LAB — BACTERIA UR CULT: NORMAL

## 2020-05-19 ENCOUNTER — VIRTUAL VISIT (OUTPATIENT)
Dept: FAMILY MEDICINE CLINIC | Age: 85
End: 2020-05-19

## 2020-05-19 ENCOUNTER — TELEPHONE (OUTPATIENT)
Dept: FAMILY MEDICINE CLINIC | Age: 85
End: 2020-05-19

## 2020-05-19 DIAGNOSIS — F32.1 CURRENT MODERATE EPISODE OF MAJOR DEPRESSIVE DISORDER WITHOUT PRIOR EPISODE (HCC): ICD-10-CM

## 2020-05-19 DIAGNOSIS — M19.011 PRIMARY OSTEOARTHRITIS OF RIGHT SHOULDER: ICD-10-CM

## 2020-05-19 DIAGNOSIS — F01.50 VASCULAR DEMENTIA WITHOUT BEHAVIORAL DISTURBANCE (HCC): Primary | ICD-10-CM

## 2020-05-19 DIAGNOSIS — R39.9 UTI SYMPTOMS: ICD-10-CM

## 2020-05-19 RX ORDER — SERTRALINE HYDROCHLORIDE 50 MG/1
50 TABLET, FILM COATED ORAL DAILY
Qty: 30 TAB | Refills: 3 | Status: SHIPPED | OUTPATIENT
Start: 2020-05-19 | End: 2020-06-01 | Stop reason: SDUPTHER

## 2020-05-19 NOTE — TELEPHONE ENCOUNTER
Patient was on a call with Dr. Declan Brandon this morning and can not remember what Dr. Declan Brandon was telling her during the visit. She would like to speak to the nurse.

## 2020-05-19 NOTE — TELEPHONE ENCOUNTER
Spoke with daughter, Jayme Pickett 65 -  Discussed pt with worsening dementia. Recommend group home vs care giver. I also recommend she get POA for legal/financial reasons. Pt will need repeat MOCA at next office visit. Daughter is agreeable. She is looking into [facility] for med distribution as she has difficulty with meds. No children live locally ([place], [place], [place]). Consider meals on wheels for assistance with meals. Consider DMV driving assessment based on more recent moca results.

## 2020-05-19 NOTE — PROGRESS NOTES
Nini Cottrell is a 80 y.o. female evaluated via telephone on 5/19/2020. Consent:  She and/or health care decision maker is aware that that she may receive a bill for this telephone service, depending on her insurance coverage, and has provided verbal consent to proceed: Yes      Documentation:  I communicated with the patient and/or health care decision maker about depression. She is seeing a therapist, but was unable to get the phone appt yesterday. She has been noncompliant with antidepressant therapy in the past.      She c/o shoulder pain, ongoing. I had seen her 3/5 and we addressed following up with Dr. Archana Eagle regarding this. She has not made this appt. Pt states she has ongoing urinary sx. However, pt is very confused and when reviewing meds, doesn't know what augmentin (prescribed 4 days ago) is even for and she's not even taking it. She is very confused about her meds. She is combining ibuprofen and naprosyn even though she was counseled not to. She is missing appointments with doctors. I discussed her worsening dementia. Pt agrees her memory is \"terrible\". I discussed that I did not feel she is safe to live alone and needs to live with family or in [place]. I asked if I could contact her daughter, Yamilka Young. Pt agreed and provided contact info [phone]. I attempted to reach daughter to discuss placement in [place] or with family member, but phone just rang and no voicemail. For the depression- start zoloft. Discussed pt has to take every day for it to help. I affirm this is a Patient Initiated Episode with an Established Patient who has not had a related appointment within my department in the past 7 days or scheduled within the next 24 hours.     Total Time: minutes: 11-20 minutes    Note: not billable if this call serves to triage the patient into an appointment for the relevant concern  This service was provided through (Telehealth, Virtual Check In or E-Visit), both the patient at home and the provider at [facility]  and the [facility] at [address]

## 2020-05-21 NOTE — TELEPHONE ENCOUNTER
Dr Andrez Dandy spoke with pt's daughter regarding memory issues. They are going to get a  involved. Pt needs supervision to continue to live independently.

## 2020-05-28 ENCOUNTER — TELEPHONE (OUTPATIENT)
Dept: FAMILY MEDICINE CLINIC | Age: 85
End: 2020-05-28

## 2020-05-28 NOTE — TELEPHONE ENCOUNTER
Pt was seen on 5/19/2020 and given zoloft and she wants to talk to the nurse because she states she doesn't feel good and does not think that it is working like it should. Please advise.      Future Appointments   Date Time Provider Adalid Asif   6/4/2020  1:30 PM Hipolito Skinner MD [facility] [place]

## 2020-05-29 ENCOUNTER — TELEPHONE (OUTPATIENT)
Dept: FAMILY MEDICINE CLINIC | Age: 85
End: 2020-05-29

## 2020-05-29 NOTE — TELEPHONE ENCOUNTER
Called daughter. She will try to set this up. Asked if pt can come here for B12 injections instead of pills. I told her we had concerns about her driving and this would put her on the road which is a problem. She will have pill pack call us.

## 2020-05-29 NOTE — TELEPHONE ENCOUNTER
Pt called back. She is very confused. She says the zoloft 100 mg isn't working and can it be increased? I told her that was her old bottle that was discontinued in March. She doesn't remember whether she picked up the new 50 mg strength at [facility]. She asked if it was the same as naproxen, then asked me to confirm that she takes it three times per day. She doesn't recall any new medicine being called in for her recently. I went over the strength and directions several times and asked her to write it down. She asked for elicit number and said she will call them.

## 2020-06-01 DIAGNOSIS — G89.29 CHRONIC RIGHT SHOULDER PAIN: ICD-10-CM

## 2020-06-01 DIAGNOSIS — M25.511 CHRONIC RIGHT SHOULDER PAIN: ICD-10-CM

## 2020-06-01 NOTE — TELEPHONE ENCOUNTER
Patient called requesting a refill on her medication. Requested Prescriptions     Pending Prescriptions Disp Refills    naproxen (NAPROSYN) 500 mg tablet 180 Tab 0     Sig: Take 1 Tab by mouth two (2) times daily (with meals).      Future Appointments   Date Time Provider Adalid Asif   6/4/2020  1:30 PM Adina Mcmahon MD [facility]

## 2020-06-02 RX ORDER — SERTRALINE HYDROCHLORIDE 50 MG/1
50 TABLET, FILM COATED ORAL DAILY
Qty: 90 TAB | Refills: 1 | Status: SHIPPED | OUTPATIENT
Start: 2020-06-02 | End: 2020-12-18 | Stop reason: SDUPTHER

## 2020-06-02 RX ORDER — NAPROXEN 500 MG/1
500 TABLET ORAL 2 TIMES DAILY WITH MEALS
Qty: 180 TAB | Refills: 1 | Status: SHIPPED | OUTPATIENT
Start: 2020-06-02 | End: 2020-07-16 | Stop reason: ALTCHOICE

## 2020-06-02 RX ORDER — LANOLIN ALCOHOL/MO/W.PET/CERES
1000 CREAM (GRAM) TOPICAL DAILY
Qty: 90 TAB | Refills: 3 | Status: SHIPPED | OUTPATIENT
Start: 2020-06-02 | End: 2020-06-04 | Stop reason: ALTCHOICE

## 2020-06-04 ENCOUNTER — VIRTUAL VISIT (OUTPATIENT)
Dept: FAMILY MEDICINE CLINIC | Age: 85
End: 2020-06-04

## 2020-06-04 DIAGNOSIS — G47.33 OSA (OBSTRUCTIVE SLEEP APNEA): ICD-10-CM

## 2020-06-04 DIAGNOSIS — Z78.9 FULL CODE STATUS: ICD-10-CM

## 2020-06-04 DIAGNOSIS — E53.8 B12 DEFICIENCY: ICD-10-CM

## 2020-06-04 DIAGNOSIS — Z00.00 MEDICARE ANNUAL WELLNESS VISIT, SUBSEQUENT: ICD-10-CM

## 2020-06-04 DIAGNOSIS — N32.81 OAB (OVERACTIVE BLADDER): Primary | ICD-10-CM

## 2020-06-04 RX ORDER — OXYBUTYNIN CHLORIDE 10 MG/1
10 TABLET, EXTENDED RELEASE ORAL DAILY
Qty: 90 TAB | Refills: 0 | Status: SHIPPED | OUTPATIENT
Start: 2020-06-04 | End: 2020-07-17 | Stop reason: ALTCHOICE

## 2020-06-04 NOTE — PATIENT INSTRUCTIONS
Medicare Wellness Visit, Female The best way to live healthy is to have a lifestyle where you eat a well-balanced diet, exercise regularly, limit alcohol use, and quit all forms of tobacco/nicotine, if applicable. Regular preventive services are another way to keep healthy. Preventive services (vaccines, screening tests, monitoring & exams) can help personalize your care plan, which helps you manage your own care. Screening tests can find health problems at the earliest stages, when they are easiest to treat. [facility] follows the current, evidence-based guidelines published by the [facility] ([facility]) when recommending preventive services for our patients. Because we follow these guidelines, sometimes recommendations change over time as research supports it. (For example, mammograms used to be recommended annually. Even though Medicare will still pay for an annual mammogram, the newer guidelines recommend a mammogram every two years for women of average risk). Of course, you and your doctor may decide to screen more often for some diseases, based on your risk and your co-morbidities (chronic disease you are already diagnosed with). Preventive services for you include: - Medicare offers their members a free annual wellness visit, which is time for you and your primary care provider to discuss and plan for your preventive service needs. Take advantage of this benefit every year! 
-All adults over the age of 72 should receive the recommended pneumonia vaccines. Current USPSTF guidelines recommend a series of two vaccines for the best pneumonia protection.  
-All adults should have a flu vaccine yearly and a tetanus vaccine every 10 years.  
-All adults age 48 and older should receive the shingles vaccines (series of two vaccines). -All adults age 38-68 who are overweight should have a diabetes screening test once every three years. -All adults born between [birth year] and 1965 should be screened once for Hepatitis C. 
-Other screening tests and preventive services for persons with diabetes include: an eye exam to screen for diabetic retinopathy, a kidney function test, a foot exam, and stricter control over your cholesterol.  
-Cardiovascular screening for adults with routine risk involves an electrocardiogram (ECG) at intervals determined by your doctor.  
-Colorectal cancer screenings should be done for adults age 54-65 with no increased risk factors for colorectal cancer. There are a number of acceptable methods of screening for this type of cancer. Each test has its own benefits and drawbacks. Discuss with your doctor what is most appropriate for you during your annual wellness visit. The different tests include: colonoscopy (considered the best screening method), a fecal occult blood test, a fecal DNA test, and sigmoidoscopy. 
 
-A bone mass density test is recommended when a woman turns 65 to screen for osteoporosis. This test is only recommended one time, as a screening. Some providers will use this same test as a disease monitoring tool if you already have osteoporosis. -Breast cancer screenings are recommended every other year for women of normal risk, age 54-69. 
-Cervical cancer screenings for women over age 72 are only recommended with certain risk factors. Here is a list of your current Health Maintenance items (your personalized list of preventive services) with a due date: There are no preventive care reminders to display for this patient.

## 2020-06-16 ENCOUNTER — DOCUMENTATION ONLY (OUTPATIENT)
Dept: FAMILY MEDICINE CLINIC | Age: 85
End: 2020-06-16

## 2020-06-16 NOTE — LETTER
To [address] [place] [address] [phone] fax 6/16/2020 2:29 PM 
 
[facility] [address] [address] Buddy Haroldo: Stoney Small is currently under the care of [address] [place] [address]. She has a variety of chronic medical conditions and has been advised to move to a first floor apartment if possible. If there are questions or concerns please have the patient contact our office. Sincerely, Essence Pena MD

## 2020-06-16 NOTE — PROGRESS NOTES
Pt is requesting a letter stating pt needs 1st floor apartment. Currently on 4th floor. Pt has unsteady gait. They are requesting letter stating such.

## 2020-06-29 ENCOUNTER — CLINICAL SUPPORT (OUTPATIENT)
Dept: FAMILY MEDICINE CLINIC | Age: 85
End: 2020-06-29

## 2020-06-29 ENCOUNTER — TELEPHONE (OUTPATIENT)
Dept: FAMILY MEDICINE CLINIC | Age: 85
End: 2020-06-29

## 2020-06-29 VITALS — TEMPERATURE: 98 F

## 2020-06-29 DIAGNOSIS — E53.8 B12 DEFICIENCY: Primary | ICD-10-CM

## 2020-06-29 RX ORDER — CYANOCOBALAMIN 1000 UG/ML
1000 INJECTION, SOLUTION INTRAMUSCULAR; SUBCUTANEOUS ONCE
Qty: 1 ML | Refills: 0
Start: 2020-06-29 | End: 2020-06-29

## 2020-06-29 NOTE — TELEPHONE ENCOUNTER
Daughter said pt said she is having bad abdominal pain and couldn't sleep last night. Daughter is wondering if nsaids need cut back. Pt will come in today for B12 injection.

## 2020-06-29 NOTE — TELEPHONE ENCOUNTER
Pt has long h/o IBS with abd cramping related to stress. But nsaids could be contributing, hold nsaids for now.

## 2020-06-29 NOTE — PROGRESS NOTES
Yanira Ramos is a 80 y.o. female  Pt presents for B12 injection. 1 mL of office stock B12 right deltoid. Pt tolerated well. Scheduled next 1 month B12 injection. Pt was given pcp advise to hold naprosyn to see if abdominal pain lessened. Pt says she's been under stress with moving apartments. She will hold the naprosyn for now.

## 2020-06-30 NOTE — TELEPHONE ENCOUNTER
Pt came for nurse visit 06/29/2020. Gave this information to the patient. She will hold her naprosyn for now.

## 2020-07-15 ENCOUNTER — TELEPHONE (OUTPATIENT)
Dept: FAMILY MEDICINE CLINIC | Age: 85
End: 2020-07-15

## 2020-07-16 ENCOUNTER — VIRTUAL VISIT (OUTPATIENT)
Dept: FAMILY MEDICINE CLINIC | Age: 85
End: 2020-07-16

## 2020-07-16 ENCOUNTER — TELEPHONE (OUTPATIENT)
Dept: FAMILY MEDICINE CLINIC | Age: 85
End: 2020-07-16

## 2020-07-16 DIAGNOSIS — M25.511 CHRONIC RIGHT SHOULDER PAIN: ICD-10-CM

## 2020-07-16 DIAGNOSIS — R53.82 CHRONIC FATIGUE: ICD-10-CM

## 2020-07-16 DIAGNOSIS — E53.8 B12 DEFICIENCY: ICD-10-CM

## 2020-07-16 DIAGNOSIS — F32.1 CURRENT MODERATE EPISODE OF MAJOR DEPRESSIVE DISORDER WITHOUT PRIOR EPISODE (HCC): Primary | ICD-10-CM

## 2020-07-16 DIAGNOSIS — G89.29 CHRONIC RIGHT SHOULDER PAIN: ICD-10-CM

## 2020-07-16 RX ORDER — BUPROPION HYDROCHLORIDE 150 MG/1
150 TABLET ORAL
Qty: 90 TAB | Refills: 0 | Status: SHIPPED | OUTPATIENT
Start: 2020-07-16 | End: 2020-07-17 | Stop reason: ALTCHOICE

## 2020-07-16 NOTE — TELEPHONE ENCOUNTER
Attempted to call daughter Nitza Law, per pt request to update on last visit. Pt with moderate depression despite zoloft. I'm adding wellbutrin and ckecking labs.  F/u in 1mo

## 2020-07-16 NOTE — TELEPHONE ENCOUNTER
Daughter called regarding appt. Tried to set up lab appt for pt with daughter. She is considering having uber bring her so she won't drive as pt refuses to use the facility bus. Daughter will call pt's neighbor and call back. Called back, would like pcp to call her. Other sister was in town, helping with the recent move. Found a lot of the pillpak med packs lying around. It appeared pt has only been taking zoloft off and on. This week Noemi Díaz has been calling her every morning so she will take her meds on the phone with her. Daughter Noemi Díaz feels mother has only been consistent for one week and hesitates having her take more medication. She would like a call from the physician  [phone].

## 2020-07-16 NOTE — LETTER
7/20/2020 9:51 AM 
 
Ms. Richard Toth [address] 2201 [facility] [address] Ms. Ayon, This is a list of area mental health providers. You will need to schedule an appointment for evaluation. Thank you.  
 
651 Stefanie Salinas 5601 [facility], Patricio 101 [place], [address] [facility] 
[phone] Fax Psychiatrist and Therapist 
Accepts Medicare, Medicaid, most private insurance, and self-pay Comment: Has group therapy and support groups [facility] 30 [facility], [address] [facility] 
[phone] 
[phone] Fax Psychiatrist and Therapist 
Accepts: Medicare, Medicaid (No Healthkeepers), most private insurance, and self-pay [facility] Risa 6508 Dr. Claudia Freed 200 [place], 70 [facility] 
[phone] Fax Therapist 
Accepts: Medicare, Medicaid, most private insurance, and self-pay [facility] 522 [facility] 201 [place], [address] [facility] 
[phone] Fax Therapist, Psychologist and Physician Assistant Accepts: Medicare, Medicaid, private insurance and self-pay Psychiatric Services [address] [facility]. 8 [facility] [place], [facility] 6 
[phone] Fax Psychiatrist and therapist 
Accepts: Medicare, most private insurance, and self-pay. No Medicaid. The [address] [facility] 505 [facility] 207 [place], [address] 
[phone] Fax Therapist  
Accepts: Medicare, Medicaid, most private insurance and self-Pay [facility] [place], [place], 70 [facility] 
[phone] Psychiatrist and therapist 
Accepts: Medicare, Medicaid, most private insurance and self-pay [address] [facility] 1395 [facility], [address] [place], 310 [facility] 
[phone] PureWRX.Thought Network S.A.S [address] [facility] 79 [place], 310 [facility] [address] 
[phone] 
Bonny Kumarnieves, [address] 
[phone] 
[email or web address] [address] 5445 [facility], [address] [facility] 
[phone] Dr. Helen Rollins 12[address] [facility] [address] [place], [address] [facility] 
[phone] [address] 5980 [facility] Moving 1/6/2017 to [address] [place], 70 [facility] 
[phone] 
CreditReportCA.[email or web address] 
 
[facility], [place] [address] [address] [place], 310 [facility] [address]  
[phone] 
[email or web address] com [address] 4007 [facility], [address] [place], [address] 
[phone] 
[email or web address]. Thought Network S.A.S [facility] [employer] [facility] 920 [facility], [address] [place] [address] Ln 
[phone] 
[email or web address] com [facility] [address] [place] [address] [place], 310 [facility] [address] 
[phone] 
[email or web address] 48 Emeli Pickard, [place], [address] [place] [address] 
[phone] 
MediaExhibitions.no [address] [facility] [address], PC 
[address] [address] [place], [address] 
[phone] 
[email or web address] Thought Network S.A.S 
 
 
Associates at [employer] 101 [address] 2600 [facility] 
[phone] 
[email or web address]. com [address] and [employer]  [address], [address], [place], [address] [place] [address] 
[phone] 
[email or web address]. com 
 
 
 [facility] (Dr. Rell Anton) 
[address] [place], [address] 
[phone] 
[email or web address] [address] Psychiatry (Dr. Amber Harmon) [facility], 9352 [facility] [place], [place], 295 [facility] [phone] 
--will accept self-pay 
Apsalar.pt Dr. Timmons Quivers [address], [address] [place], [facility] 6  
[phone] 
[email or web address].fi. html Fei CSB 
1925 [facility],5Th Floor [address] [place], 70 [address] [phone] Fax Alternate Phone: [phone] Accepts: Medicare, Medicaid, most private insurance and self-pay Comments: Must be a [facility] HSPTL.    
 
Sincerely, Rory Breaux MD

## 2020-07-16 NOTE — PROGRESS NOTES
Cash Pearson is a 80 y.o. female evaluated via telephone on 7/16/2020. Consent:  She and/or health care decision maker is aware that that she may receive a bill for this telephone service, depending on her insurance coverage, and has provided verbal consent to proceed: Yes    Documentation:  I communicated with the patient and/or health care decision maker about:  She moved from 4th floor to 1st floor, per pt request.  States this move has worn her out. She's depressed. She states she's weak and doesn't have energy. She's on zoloft but doesn't feel it helps. Diagnoses and all orders for this visit:    1. Current moderate episode of major depressive disorder without prior episode ([facility] [address]- moderate sx despite zoloft. Add wellbutrin. F/u in1 mo  -     buPROPion XL (WELLBUTRIN XL) 150 mg tablet; Take 1 Tab by mouth every morning. 2. B12 deficiency  -     METABOLIC PANEL, COMPREHENSIVE; Future  -     VITAMIN B12; Future    3. Chronic fatigue  -     CBC W/O DIFF; Future    I affirm this is a Patient Initiated Episode with an Established Patient who has not had a related appointment within my department in the past 7 days or scheduled within the next 24 hours.     Total Time: minutes: 11-20 minutes    Note: not billable if this call serves to triage the patient into an appointment for the relevant concern  This service was provided through (Telehealth, Virtual Check In or E-Visit), both the patient at home and the provider at [facility]  and the [facility] [address] at [address], MD   3 most recent [place] [address] Screens 7/16/2020   PHQ Not Done -   Little interest or pleasure in doing things More than half the days   Feeling down, depressed, irritable, or hopeless Nearly every day   Total Score PHQ 2 5   Trouble falling or staying asleep, or sleeping too much Not at all   Feeling tired or having little energy Nearly every day   Poor appetite, weight loss, or overeating Several days   Feeling bad about yourself - or that you are a failure or have let yourself or your family down Nearly every day   Trouble concentrating on things such as school, work, reading, or watching TV Not at all   Moving or speaking so slowly that other people could have noticed; or the opposite being so fidgety that others notice Not at all   Thoughts of being better off dead, or hurting yourself in some way Several days   PHQ 9 Score 13   How difficult have these problems made it for you to do your work, take care of your home and get along with others Very difficult

## 2020-07-17 ENCOUNTER — TELEPHONE (OUTPATIENT)
Dept: FAMILY MEDICINE CLINIC | Age: 85
End: 2020-07-17

## 2020-07-17 NOTE — TELEPHONE ENCOUNTER
Spoke with jim. Patient not compliant with dosing of meds (only taking 1 zoloft/week). Daughter plans on now calling pt daily to ensure taking meds. They don't wish to escalate meds at this time. Daughter needs to be present for visits in future due to pt's dementia and being an unreliable historian. Please note in corry all future appt to be scheduled with Jose Roberto Roach, daughter (available by phone).

## 2020-07-20 ENCOUNTER — TELEPHONE (OUTPATIENT)
Dept: FAMILY MEDICINE CLINIC | Age: 85
End: 2020-07-20

## 2020-07-20 NOTE — TELEPHONE ENCOUNTER
Spoke with daughter Lynette Barahona today. Mailed a copy of the mental health providers as well. She understands future visits have to involve her.

## 2020-07-20 NOTE — TELEPHONE ENCOUNTER
7/29/20 is B12 shot only. Daughter will have a neighbor drive the pt. She understands no future pcp visits without her involvement. Mailing list of mental health providers to pt and to daughter as her emergency contact (has [address]) [address] [place], [address].

## 2020-07-20 NOTE — TELEPHONE ENCOUNTER
Cancel 7/29 ov. All future visits are to be scheduled with daughter. Tell her we will mail this list for mental health providers. Make an appointment with:     [facility]  5601 [facility], 1100 . [facility], [address] [facility]  [phone] Fax  Psychiatrist and Therapist  Accepts Medicare, Medicaid, most private insurance, and self-pay  Comment: Has group therapy and support groups    [facility]  [place] [facility] 193, [address] [facility]  [phone] Fax  Psychiatrist and Therapist  Accepts: Medicare, Medicaid (No Healthkeepers), most private insurance, and self-pay     [address] 430 [facility], 70 [facility]  [phone] Fax  Therapist  Accepts: Medicare, Medicaid, most private insurance, and self-pay     [facility]. 210 [facility]. 430 [facility], [address] [facility]  [phone] Fax  Therapist, Psychologist and Physician Assistant  Accepts: Medicare, Medicaid, private insurance and Roshni Briseno 45. [address], [place] [address]  [phone] Fax  Psychiatrist and therapist  Accepts: Medicare, most private insurance, and self-pay. No Medicaid. The [address]  210 [facility]. 430 [facility], 199 [facility]  [phone] Fax  Therapist   Accepts: Medicare, Medicaid, most private insurance and self-Pay    [facility]  [facility], 70 [facility]  [phone]  Psychiatrist and therapist  Accepts: Medicare, Medicaid, most private insurance and self-pay    [address] [facility]  1395 [facility], [address] [place] [address] 1821 [facility]  Watchup    [address]  89 [facility], 310 [facility] L[address]  [phone]  Henryamalia. 2858 [facility]  1009 [facility], [address]  [phone]  [email or web address]    [address]  2080 [facility], [address] [facility]  [phone]  No website    Dr. Lena Galeazzi [address]  [place], [address] [place] [address]  [phone]  No website    [address]   5980 [facility]   Moving 7/7/7770 to [address] [facility], 70 [facility]  [phone]  CreditReportCA.[facility] 64, 1500 [place] [address] Jourdan Rina  [place], [address] [place] [address] Ln   [phone]  [email or web address] [address] [facility] 166 [facility] Street, 700 [facility],Patric[address] 210  [place], [address]  [phone]  [email or web address]    [facility]  Specializes in substance abuse treatment  [address] [facility], [address] [place] [address]  [phone]  [email or web address]    [facility] Psychotherapy   601 [facility], 310 [facility] L[address]  [phone]  [email or web address] com    [facility]  . [facility] 126, 3500 [facility]  [email or web address]    [facility]. ?   [address], [place], [address] [place] [address]  [phone]  MediaExhibitions.no    [facility], [facility]  [address] [address] [place], 520 [facility], [address] [place] [address]  [phone]  [email or web address]. [address]  6797 [facility], [address] [place], [place] [address]  [phone]  **specializes in LGBT clients**  Prisync. SynerZ Medical    Associates at [address], [address] [place] [address] Road  [phone]  [email or web address]. com    [address] and Consulting  Michelle [address] [place] [address], [address] [place] [address] Road  [phone]  [email or web address]. com    [facility] (Dr. Oliver Lisa)  [address] 1601 E [facility], [address] [place] [address] Road  [phone]  [email or web address]    [facility] Psychiatry (Dr. Finesse Randall)  [facility], 9352 [place], [place], [address] [place] [address] [phone]  --will accept self-pay  [email or web address]    Dr. Owusu [facility]  [address] [facility] 176, [place] [address]   [phone]  FindSfi.. [address] [place] [place]  Alexandraesau [facility] Six # [facility], 70 [facility]   [phone] Fax  Alternate Phone: [phone]   Accepts: Medicare, Medicaid, most private insurance and self-pay  Comments: Must be a [address].

## 2020-07-20 NOTE — TELEPHONE ENCOUNTER
Patient called stating that she is feeling depressed and wanted to be referred to a mental health doctor. Patient states \"well i'm in my late 63's and i'm just not feeling right\".  Please advise

## 2020-08-03 ENCOUNTER — VIRTUAL VISIT (OUTPATIENT)
Dept: FAMILY MEDICINE CLINIC | Age: 85
End: 2020-08-03

## 2020-08-03 ENCOUNTER — CLINICAL SUPPORT (OUTPATIENT)
Dept: FAMILY MEDICINE CLINIC | Age: 85
End: 2020-08-03

## 2020-08-03 VITALS — TEMPERATURE: 98 F

## 2020-08-03 DIAGNOSIS — E53.8 B12 DEFICIENCY: Primary | ICD-10-CM

## 2020-08-03 RX ORDER — PRAZOSIN HYDROCHLORIDE 1 MG/1
1 CAPSULE ORAL
COMMUNITY
Start: 2020-07-29 | End: 2021-02-01

## 2020-08-03 RX ORDER — CYANOCOBALAMIN 1000 UG/ML
1000 INJECTION, SOLUTION INTRAMUSCULAR; SUBCUTANEOUS ONCE
Qty: 1 ML | Refills: 0
Start: 2020-08-03 | End: 2020-08-03

## 2020-08-03 NOTE — PROGRESS NOTES
Immunization/s administered 8/3/2020 by Maday Hightower LPN with guardian's consent. Patient tolerated procedure well. No reactions noted. B12 1000 mcg/mL  Right deltoid  Office stock used. [id number] lot [id number] exp 01/2021  Pt seemed to not remember ever having this injection before. She kept asking what is what and what is was for. I went over all of that with her. We just did this injection 06/29/2020. She also asked if another dr could see her for the sleeping difficulties. I told her that she was just put on a new med  (per daughter conversation today) from her psychiatrist to help her. Prazosin 1 mg qhs. I escorted pt to waiting room. Her ride was with her in the hallway.

## 2020-08-04 LAB
ALBUMIN SERPL-MCNC: 4.3 G/DL (ref 3.6–4.6)
ALBUMIN/GLOB SERPL: 1.7 {RATIO} (ref 1.2–2.2)
ALP SERPL-CCNC: 78 IU/L (ref 39–117)
ALT SERPL-CCNC: 16 IU/L (ref 0–32)
AST SERPL-CCNC: 16 IU/L (ref 0–40)
BILIRUB SERPL-MCNC: 0.3 MG/DL (ref 0–1.2)
BUN SERPL-MCNC: 23 MG/DL (ref 8–27)
BUN/CREAT SERPL: 27 (ref 12–28)
CALCIUM SERPL-MCNC: 9.7 MG/DL (ref 8.7–10.3)
CHLORIDE SERPL-SCNC: 107 MMOL/L (ref 96–106)
CO2 SERPL-SCNC: 25 MMOL/L (ref 20–29)
CREAT SERPL-MCNC: 0.85 MG/DL (ref 0.57–1)
ERYTHROCYTE [DISTWIDTH] IN BLOOD BY AUTOMATED COUNT: 12.8 % (ref 11.7–15.4)
GLOBULIN SER CALC-MCNC: 2.5 G/DL (ref 1.5–4.5)
GLUCOSE SERPL-MCNC: 81 MG/DL (ref 65–99)
HCT VFR BLD AUTO: 40 % (ref 34–46.6)
HGB BLD-MCNC: 12.9 G/DL (ref 11.1–15.9)
MCH RBC QN AUTO: 30.6 PG (ref 26.6–33)
MCHC RBC AUTO-ENTMCNC: 32.3 G/DL (ref 31.5–35.7)
MCV RBC AUTO: 95 FL (ref 79–97)
PLATELET # BLD AUTO: 186 X10E3/UL (ref 150–450)
POTASSIUM SERPL-SCNC: 4.1 MMOL/L (ref 3.5–5.2)
PROT SERPL-MCNC: 6.8 G/DL (ref 6–8.5)
RBC # BLD AUTO: 4.21 X10E6/UL (ref 3.77–5.28)
SODIUM SERPL-SCNC: 146 MMOL/L (ref 134–144)
VIT B12 SERPL-MCNC: 774 PG/ML (ref 232–1245)
WBC # BLD AUTO: 6.3 X10E3/UL (ref 3.4–10.8)

## 2020-08-04 NOTE — PROGRESS NOTES
Tell daughter Emilee Corona and pt that labs look good. b12 levels are good. She appears dehydrated and needs to drink more water.

## 2020-09-01 ENCOUNTER — VIRTUAL VISIT (OUTPATIENT)
Dept: FAMILY MEDICINE CLINIC | Age: 85
End: 2020-09-01

## 2020-09-01 DIAGNOSIS — F32.1 CURRENT MODERATE EPISODE OF MAJOR DEPRESSIVE DISORDER WITHOUT PRIOR EPISODE (HCC): Primary | ICD-10-CM

## 2020-09-01 NOTE — PROGRESS NOTES
Nicol Barrientos is a 80 y.o. female evaluated via telephone on 9/1/2020. Consent:  She and/or health care decision maker is aware that that she may receive a bill for this telephone service, depending on her insurance coverage, and has provided verbal consent to proceed: Yes    Documentation:  I communicated with the patient and/or health care decision maker about:    She c/o feeling weak and tired. She states all I want to do is lay down b/c I have no motivation. She thought maybe she had UTI. No dysuria or urine odor. Had nml cbc, b12 this month. Goes to bed at 10pm, gets up at 9am.  She's compliant with cpap machine. She admits to depression. States her kids don't call and visit. Details of this discussion including any medical advice provided:   Discussed that I think her sx are more related to depression. Labs were good recently. Pt will look into going to counseling. She is only intermittent compliant with zoloft. Encouraged compliance wiht zoloft. I affirm this is a Patient Initiated Episode with an Established Patient who has not had a related appointment within my department in the past 7 days or scheduled within the next 24 hours.     Total Time: minutes: 11-20 minutes    Note: not billable if this call serves to triage the patient into an appointment for the relevant concern  This service was provided through (Telehealth, Virtual Check In or E-Visit), both the patient at home and the provider at [facility]  and the SANTINO Cheng at Jenni0 BERENICE Palacios MD

## 2020-09-03 ENCOUNTER — CLINICAL SUPPORT (OUTPATIENT)
Dept: FAMILY MEDICINE CLINIC | Age: 85
End: 2020-09-03

## 2020-09-03 VITALS — TEMPERATURE: 97.4 F | SYSTOLIC BLOOD PRESSURE: 124 MMHG | DIASTOLIC BLOOD PRESSURE: 62 MMHG

## 2020-09-03 DIAGNOSIS — E53.8 B12 DEFICIENCY: Primary | ICD-10-CM

## 2020-09-03 RX ORDER — CYANOCOBALAMIN 1000 UG/ML
1000 INJECTION, SOLUTION INTRAMUSCULAR; SUBCUTANEOUS ONCE
Qty: 1 ML | Refills: 0
Start: 2020-09-03 | End: 2020-09-03

## 2020-09-03 NOTE — PROGRESS NOTES
Immunization/s administered 9/3/2020 by Anatoliy Duarte LPN with guardian's consent. Patient tolerated procedure well. No reactions noted. Pt presents for monthly B12 injection. Used office stock. Pt's bp checked at this visit. Pt complains of fatigue. This was addressed by dr at last virtual visit. Pt does not remember having a visit with the dr. Caitlyn Negron called daughter during nurse visit. Daughter was advised pt had recent dr telephone visit and was here today for bp and B112. Pt tolerated injection well. Left deltoid. One month b12 visit scheduled for 10/2/20. Reminder sheet given to pt. Daughter said pt is seeing [place] [facility] and [address] [place] [address] Psychotherapy. Pt isn't advised to drive currently but still drives locally.

## 2020-09-15 ENCOUNTER — TELEPHONE (OUTPATIENT)
Dept: FAMILY MEDICINE CLINIC | Age: 85
End: 2020-09-15

## 2020-09-15 NOTE — TELEPHONE ENCOUNTER
Pt called because she stated her urine was clear and she thought she might have a uti. I asked her if she had any other symptoms or discomfort and she said she didn't have any discomfort or anything but she does feel tired all the time. I spoke to Shaan and she said that she spoke to Dr Gala Kwok on 9/1/2020 about this. I reminded her that she did speak to Dr Gala Kwok about this and told her that clear urine was a good thing and was not a uti and she was supposed to f/u w/ her psychiatrist. She couldn't remember the number so I gave her the info for [address] [facility] [address] and 067 [facility] [phone].  Please advise

## 2020-09-17 ENCOUNTER — TELEPHONE (OUTPATIENT)
Dept: FAMILY MEDICINE CLINIC | Age: 85
End: 2020-09-17

## 2020-09-17 NOTE — TELEPHONE ENCOUNTER
Pt called back to [facility] and wanted to talk to her. [facility] was not available and informed her that she did call her daughter and left her with some info so I recommended that she call her daughter because [facility] gave her all the info. She still wanted a call back from [facility] so I asked her what exactly her question was for [facility] and she said that she is going to the bathroom frequently and her urine is clear and she wanted to know if she should come in and leave a sample. I asked her if she was experiencing any discomfort while she peed or any unusual oders and she said no. I told her that we had already spoken about this on the 15th and that she did not need to come in to leave a specimen because she has no symptoms of a uti and that clear urine was good. She again mentioned that she didn't like how she felt so tired all the time. I told her that Dr Ada Estrada wanted her to f/u w/ her psych office because she felt that her tiredness had to do w/ her depression. I asked her if she called and she said that she did but they never called her back. When I asked her what date did she call them she said she couldn't remember, \"probably yesterday\". I told her that for our office we have a policy where phone calls could take up to 72 hours for a return call and if she did call them yesterday then she probably wasn't giving them enough time for them to call her back. I told her to give it until Friday for them to return call. Pt thanked me for the info.

## 2020-09-17 NOTE — TELEPHONE ENCOUNTER
Pt called, leaving message at nurses station. Pt says she needs Dr Harsha Barrientos nurse. Pt has been calling, leaving messages on nurse vm frequently and speaking with the nurses and psrs  . I called her emergency contact, German Winston. Terris Canavan says she talked to her today and told her again about her clear urine and she's tired. She is sending a lady to sit with her twice per week. Terris Canavan said she sends her BOOST, emergen-C powder and she gets her B12 injections. She agrees she doesn't need seen and these concerns have been addressed in multiple calls since her last vv with the dr. Rodrigo Murdock forward, per pcp, pt does need to relay concerns to her daughter and her daughter will be the one to schedule appts for her.

## 2020-09-18 ENCOUNTER — TELEPHONE (OUTPATIENT)
Dept: FAMILY MEDICINE CLINIC | Age: 85
End: 2020-09-18

## 2020-09-18 NOTE — TELEPHONE ENCOUNTER
Patient received a form from [facility] about her license and she needs help completing it out.  It states that it needs to be completed by a healthcare professional.

## 2020-09-18 NOTE — TELEPHONE ENCOUNTER
Called daughter, she set up telephone visit for 9/22/2020. Pt needs evaluated for driving ability, is bringing DMV form here.

## 2020-09-22 ENCOUNTER — VIRTUAL VISIT (OUTPATIENT)
Dept: FAMILY MEDICINE CLINIC | Age: 85
End: 2020-09-22
Payer: MEDICARE

## 2020-09-22 DIAGNOSIS — Z04.1 ENCOUNTER FOR EXAMINATION FOLLOWING MOTOR VEHICLE ACCIDENT (MVA): ICD-10-CM

## 2020-09-22 DIAGNOSIS — F01.50 VASCULAR DEMENTIA WITHOUT BEHAVIORAL DISTURBANCE (HCC): Primary | ICD-10-CM

## 2020-09-22 PROCEDURE — 99442 PR PHYS/QHP TELEPHONE EVALUATION 11-20 MIN: CPT | Performed by: INTERNAL MEDICINE
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
## 2020-09-23 ENCOUNTER — TELEPHONE (OUTPATIENT)
Dept: FAMILY MEDICINE CLINIC | Age: 85
End: 2020-09-23

## 2020-09-23 NOTE — TELEPHONE ENCOUNTER
Pt was supposed to wait until 9/24/2020 to come with aide to office to sign front page of [facility] medical evaluation form so it can be submitted to DMV. Pt came today, drove by herself over here. Signed the sheet but asked what it was. Pt dropped this off days ago herself but acted as if she wasn't familiar with the form. Pt asked if we were going to submit this to [facility] so she could drive. I told her this was discussed at her tele visit with Dr Elaine Luong just yesterday for almost an hour. Dr Elaine Luong did explain to the patient repeatedly that she does not want her driving. Pt says she wants to know why. I told her that was discussed at the visit and that I wasn't present and couldn't give her all the reasons. She asked if the dr could call her. I told her no that she just did call her yesterday for 45 minutes and went over all the details with her. She said if she doesn't drive then she might as well die. I told her to contact her daughter Anna Marie Davey regarding getting her aide to take places she needs to go to. She became quite agitated as we spoke in the hallway outside the office. She said she doesn't see her daughter because she lives in [place] and they hadn't spoken in 8 years before this and why should she have a say in her driving. She kept repeating over and over for the dr to call her and she just has to change her mind because she cannot just stop driving. I explained again that all these concerns were spoken about in her visit yesterday. I asked her to call Radha Tomyelysia her daughter to go over all the details with her. Made physician aware.

## 2020-09-24 ENCOUNTER — DOCUMENTATION ONLY (OUTPATIENT)
Dept: FAMILY MEDICINE CLINIC | Age: 85
End: 2020-09-24

## 2020-09-25 ENCOUNTER — TELEPHONE (OUTPATIENT)
Dept: FAMILY MEDICINE CLINIC | Age: 85
End: 2020-09-25

## 2020-09-25 NOTE — TELEPHONE ENCOUNTER
Patient called requesting to speak with the nurse. I asked the patient if she had a question for the nurse and she wanted to know if the form had been sent and I told her that it was faxed yesterday. She still wanted a call back from the nurse to discuss a form that was given to her telling her that she was \"not approved. \"

## 2020-09-29 NOTE — TELEPHONE ENCOUNTER
Returned pt call. No answer, left msg on vm to call back with more detail. Dr Tiffanie Kramer appears to be a cardiologist but the phone number she left is for another family practice.   Is she wanting to switch practices or is she asking for a specialist?

## 2020-10-06 ENCOUNTER — CLINICAL SUPPORT (OUTPATIENT)
Dept: FAMILY MEDICINE CLINIC | Age: 85
End: 2020-10-06
Payer: MEDICARE

## 2020-10-06 VITALS — TEMPERATURE: 96.8 F

## 2020-10-06 DIAGNOSIS — E53.8 B12 DEFICIENCY: Primary | ICD-10-CM

## 2020-10-06 PROCEDURE — 96372 THER/PROPH/DIAG INJ SC/IM: CPT | Performed by: INTERNAL MEDICINE

## 2020-10-06 RX ORDER — CYANOCOBALAMIN 1000 UG/ML
1000 INJECTION, SOLUTION INTRAMUSCULAR; SUBCUTANEOUS ONCE
Qty: 1 ML | Refills: 0
Start: 2020-10-06 | End: 2020-10-06

## 2020-10-06 NOTE — TELEPHONE ENCOUNTER
Pt came into for B12 injection. Did not ask for referral. Stated she cannot drive anymore and wanted to talk to the dr about being tired. I told pt she has to talk to the dr at a visit. Asked her to call daughter Sudeep Teran to set up any visits. She left with caretaker after injection.

## 2020-10-30 ENCOUNTER — TELEPHONE (OUTPATIENT)
Dept: FAMILY MEDICINE CLINIC | Age: 85
End: 2020-10-30

## 2020-10-30 NOTE — TELEPHONE ENCOUNTER
Nitish from the [facility] called wanting to speak with either the nurse or the doctor regarding the dmv form that Fanny Jernigan filled out for the patient.

## 2020-11-05 ENCOUNTER — CLINICAL SUPPORT (OUTPATIENT)
Dept: FAMILY MEDICINE CLINIC | Age: 85
End: 2020-11-05
Payer: MEDICARE

## 2020-11-05 VITALS — TEMPERATURE: 97.3 F

## 2020-11-05 DIAGNOSIS — E53.8 B12 DEFICIENCY: Primary | ICD-10-CM

## 2020-11-05 PROCEDURE — 96372 THER/PROPH/DIAG INJ SC/IM: CPT | Performed by: INTERNAL MEDICINE

## 2020-11-05 RX ORDER — CYANOCOBALAMIN 1000 UG/ML
1000 INJECTION, SOLUTION INTRAMUSCULAR; SUBCUTANEOUS ONCE
Qty: 1 ML | Refills: 0
Start: 2020-11-05 | End: 2020-11-05

## 2020-11-11 ENCOUNTER — TELEPHONE (OUTPATIENT)
Dept: FAMILY MEDICINE CLINIC | Age: 85
End: 2020-11-11

## 2020-11-11 NOTE — TELEPHONE ENCOUNTER
Pt called asking for us to refer her to counseling. I reminded her she sees psychiatry. She said she has a Dr. Jaleel Hunter name. Yes, that is her psychiatrist. I gave her the office number again. I have given her this number several times. She said she would call him.

## 2020-11-17 ENCOUNTER — TELEPHONE (OUTPATIENT)
Dept: FAMILY MEDICINE CLINIC | Age: 85
End: 2020-11-17

## 2020-11-17 NOTE — TELEPHONE ENCOUNTER
Patient's daughter called wanting to discuss long-term home care for the patient. Daughter would like a returned call from the nurse or  to discuss.  Please advise

## 2020-11-17 NOTE — TELEPHONE ENCOUNTER
Daughter isn't looking for placement. She has talked with [facility]PS about need daily \"long term health aide\". They are helping her find an agency and then will ask for an order for eval. Daughter would like pcp to consider Aricept for pt.

## 2020-11-18 NOTE — TELEPHONE ENCOUNTER
Can discuss meds at 12/4 visit or move appt up if they want it earlier.  Can be virtual phone visit with France Maradiaga

## 2020-11-24 ENCOUNTER — TELEPHONE (OUTPATIENT)
Dept: FAMILY MEDICINE CLINIC | Age: 85
End: 2020-11-24

## 2020-11-24 NOTE — TELEPHONE ENCOUNTER
Pt called stating she is making an appt with [facility] on [address] [facility] but they need recent notes from our office.

## 2020-11-24 NOTE — TELEPHONE ENCOUNTER
Called daughter Jody Gunderson, reached . Pt herself called today asking us to send notes to [facility], a new psychiatry office. Faxed notes today to them f [phone]. Will need a new referral to psychiatry/psychology.

## 2020-11-25 DIAGNOSIS — F32.1 CURRENT MODERATE EPISODE OF MAJOR DEPRESSIVE DISORDER WITHOUT PRIOR EPISODE (HCC): Primary | ICD-10-CM

## 2020-12-18 ENCOUNTER — VIRTUAL VISIT (OUTPATIENT)
Dept: FAMILY MEDICINE CLINIC | Age: 85
End: 2020-12-18
Payer: MEDICARE

## 2020-12-18 DIAGNOSIS — F01.50 VASCULAR DEMENTIA WITHOUT BEHAVIORAL DISTURBANCE (HCC): ICD-10-CM

## 2020-12-18 DIAGNOSIS — F32.1 CURRENT MODERATE EPISODE OF MAJOR DEPRESSIVE DISORDER WITHOUT PRIOR EPISODE (HCC): Primary | ICD-10-CM

## 2020-12-18 PROCEDURE — 99441 PR PHYS/QHP TELEPHONE EVALUATION 5-10 MIN: CPT | Performed by: INTERNAL MEDICINE

## 2020-12-18 RX ORDER — SERTRALINE HYDROCHLORIDE 50 MG/1
50 TABLET, FILM COATED ORAL DAILY
Qty: 90 TAB | Refills: 1 | Status: SHIPPED | OUTPATIENT
Start: 2020-12-18

## 2020-12-18 NOTE — PROGRESS NOTES
Namita Addison is a 80 y.o. female evaluated via telephone on 12/18/2020. Consent:  She and/or health care decision maker is aware that that she may receive a bill for this telephone service, depending on her insurance coverage, and has provided verbal consent to proceed: Yes    Documentation:  I communicated with the patient and/or health care decision maker about depression. Pt with significant dementia, chronic depression presents with c/o ongoing depression. States she's compliant with zoloft (although has h/o noncompliance and should of run out of zoloft 2 weeks ago). She goes on to say that no one visits her and she doesn't have any friends at her apartment. Details of this discussion including any medical advice provided:   Diagnoses and all orders for this visit:    1. Current moderate episode of major depressive disorder without prior episode ([address]- encouraged pt compliance with med by setting alarm. -     sertraline (Zoloft) 50 mg tablet; Take 1 Tab by mouth daily. 2. Vascular dementia without behavioral disturbance ([address]    I affirm this is a Patient Initiated Episode with an Established Patient who has not had a related appointment within my department in the past 7 days or scheduled within the next 24 hours.     Total Time: minutes: 5-10 minutes    Note: not billable if this call serves to triage the patient into an appointment for the relevant concern  This service was provided through (Telehealth, Virtual Check In or E-Visit), both the patient at home and the provider at [facility]  and the SANTINO Cheng at [facility] BERENICE Palacios MD

## 2021-02-01 ENCOUNTER — OFFICE VISIT (OUTPATIENT)
Dept: FAMILY MEDICINE CLINIC | Age: 86
End: 2021-02-01
Payer: MEDICARE

## 2021-02-01 VITALS
OXYGEN SATURATION: 99 % | TEMPERATURE: 97 F | SYSTOLIC BLOOD PRESSURE: 108 MMHG | DIASTOLIC BLOOD PRESSURE: 64 MMHG | WEIGHT: 139.4 LBS | BODY MASS INDEX: 24.7 KG/M2 | HEIGHT: 63 IN | HEART RATE: 75 BPM | RESPIRATION RATE: 14 BRPM

## 2021-02-01 DIAGNOSIS — R10.9 ABDOMINAL PAIN, UNSPECIFIED ABDOMINAL LOCATION: Primary | ICD-10-CM

## 2021-02-01 DIAGNOSIS — Z23 NEEDS FLU SHOT: ICD-10-CM

## 2021-02-01 LAB
BILIRUB UR QL STRIP: NEGATIVE
GLUCOSE UR-MCNC: NEGATIVE MG/DL
KETONES P FAST UR STRIP-MCNC: NEGATIVE MG/DL
PH UR STRIP: 7 [PH] (ref 4.6–8)
PROT UR QL STRIP: NEGATIVE
SP GR UR STRIP: 1.02 (ref 1–1.03)
UA UROBILINOGEN AMB POC: ABNORMAL (ref 0.2–1)
URINALYSIS CLARITY POC: CLEAR
URINALYSIS COLOR POC: YELLOW
URINE BLOOD POC: NEGATIVE
URINE LEUKOCYTES POC: ABNORMAL
URINE NITRITES POC: NEGATIVE

## 2021-02-01 PROCEDURE — 1100F PTFALLS ASSESS-DOCD GE2>/YR: CPT | Performed by: FAMILY MEDICINE

## 2021-02-01 PROCEDURE — 1090F PRES/ABSN URINE INCON ASSESS: CPT | Performed by: FAMILY MEDICINE

## 2021-02-01 PROCEDURE — G8420 CALC BMI NORM PARAMETERS: HCPCS | Performed by: FAMILY MEDICINE

## 2021-02-01 PROCEDURE — G8536 NO DOC ELDER MAL SCRN: HCPCS | Performed by: FAMILY MEDICINE

## 2021-02-01 PROCEDURE — G9717 DOC PT DX DEP/BP F/U NT REQ: HCPCS | Performed by: FAMILY MEDICINE

## 2021-02-01 PROCEDURE — G8427 DOCREV CUR MEDS BY ELIG CLIN: HCPCS | Performed by: FAMILY MEDICINE

## 2021-02-01 PROCEDURE — 81003 URINALYSIS AUTO W/O SCOPE: CPT | Performed by: FAMILY MEDICINE

## 2021-02-01 PROCEDURE — 99213 OFFICE O/P EST LOW 20 MIN: CPT | Performed by: FAMILY MEDICINE

## 2021-02-01 PROCEDURE — G0008 ADMIN INFLUENZA VIRUS VAC: HCPCS | Performed by: FAMILY MEDICINE

## 2021-02-01 PROCEDURE — 90694 VACC AIIV4 NO PRSRV 0.5ML IM: CPT | Performed by: FAMILY MEDICINE

## 2021-02-01 PROCEDURE — 3288F FALL RISK ASSESSMENT DOCD: CPT | Performed by: FAMILY MEDICINE

## 2021-02-01 RX ORDER — NAPROXEN 500 MG/1
500 TABLET ORAL DAILY
COMMUNITY

## 2021-02-01 NOTE — PROGRESS NOTES
Juan Woods is a 80 y.o. female (: 3/13/1931) presenting to address:    Chief Complaint   Patient presents with    Bladder Infection       Vitals:    21 1342   BP: 108/64   Pulse: 75   Resp: 14   Temp: 97 °F (36.1 °C)   TempSrc: Temporal   SpO2: 99%   Weight: 139 lb 6.4 oz (63.2 kg)   Height: 5' 3\" (1.6 m)   PainSc:   0 - No pain       Hearing/Vision:   No exam data present    Learning Assessment:     Learning Assessment 2015   PRIMARY LEARNER Patient   HIGHEST LEVEL OF EDUCATION - PRIMARY LEARNER  SOME COLLEGE   BARRIERS PRIMARY LEARNER HEARING   CO-LEARNER CAREGIVER No   PRIMARY LANGUAGE ENGLISH    NEED -   LEARNER PREFERENCE PRIMARY READING   LEARNING SPECIAL TOPICS -   ANSWERED BY self   RELATIONSHIP SELF     Depression Screening:     3 most recent PHQ Screens 2020   PHQ Not Done -   Little interest or pleasure in doing things More than half the days   Feeling down, depressed, irritable, or hopeless Nearly every day   Total Score PHQ 2 5   Trouble falling or staying asleep, or sleeping too much Not at all   Feeling tired or having little energy Nearly every day   Poor appetite, weight loss, or overeating Several days   Feeling bad about yourself - or that you are a failure or have let yourself or your family down Nearly every day   Trouble concentrating on things such as school, work, reading, or watching TV Not at all   Moving or speaking so slowly that other people could have noticed; or the opposite being so fidgety that others notice Not at all   Thoughts of being better off dead, or hurting yourself in some way Several days   PHQ 9 Score 13   How difficult have these problems made it for you to do your work, take care of your home and get along with others Very difficult     Fall Risk Assessment:     Fall Risk Assessment, last 12 mths 2021   Able to walk? Yes   Fall in past 12 months? 0   Do you feel unsteady?  0   Are you worried about falling 0   Number of falls in past 12 months -   Fall with injury? -     Abuse Screening:     Abuse Screening Questionnaire 7/29/2019   Do you ever feel afraid of your partner? N   Are you in a relationship with someone who physically or mentally threatens you? N   Is it safe for you to go home? Y     Coordination of Care Questionaire:   1. Have you been to the ER, urgent care clinic since your last visit? Hospitalized since your last visit? YES, for lightheaded, dizziness    2. Have you seen or consulted any other health care providers outside of the [address] [place] [address] since your last visit? Include any pap smears or colon screening. NO    Advanced Directive:   1. Do you have an Advanced Directive? NO    2. Would you like information on Advanced Directives? NO      Immunization of the Flu vaccine was administered 2/1/2021 by Shira Dexter LPN. Patient tolerated procedure well. No reactions noted.

## 2021-02-01 NOTE — PATIENT INSTRUCTIONS
Avoid gassy foods, list provided Try taking Gas-X, Beano or Phazyme before bedtime Schedule transfer of care appointment with new PCP A urine culture has been submitted and you will be notified if any infection is detected Gas and Bloating: Care Instructions Your Care Instructions Gas and bloating can be uncomfortable and embarrassing problems. All people pass gas, but some people produce more gas than others, sometimes enough to cause distress. It is normal to pass gas from 6 to 20 times per day. Excess gas usually is not caused by a serious health problem. Gas and bloating usually are caused by something you eat or drink, including some food supplements and medicines. Gas and bloating are usually harmless and go away without treatment. However, changing your diet can help end the problem. Some over-the-counter medicines can help prevent gas and relieve bloating. Follow-up care is a key part of your treatment and safety. Be sure to make and go to all appointments, and call your doctor if you are having problems. It's also a good idea to know your test results and keep a list of the medicines you take. How can you care for yourself at home? · Keep a food diary if you think a food gives you gas. Write down what you eat or drink. Also record when you get gas. If you notice that a food seems to cause your gas each time, avoid it and see if the gas goes away. Examples of foods that cause gas include: ? Fried and fatty foods. ? Beans. ? Vegetables such as artichokes, asparagus, broccoli, brussels sprouts, cabbage, cauliflower, cucumbers, green peppers, onions, peas, radishes, and raw potatoes. ? Fruits such as apricots, bananas, melons, peaches, pears, prunes, and raw apples. ? Wheat and wheat bran. · Soak dry beans in water overnight, then dump the water and cook the soaked beans in new water. This can help prevent gas and bloating. · If you have problems with lactose, avoid dairy products such as milk and cheese. · Try not to swallow air. Do not drink through a straw, gulp your food, or chew gum. · Take an over-the-counter medicine. Read and follow all instructions on the label. ? Food enzymes, such as Beano, can be added to gas-producing foods to prevent gas. ? Antacids, such as Maalox Anti-Gas and Mylanta Gas, can relieve bloating by making you burp. Be careful when you take over-the-counter antacid medicines. Many of these medicines have aspirin in them. Read the label to make sure that you are not taking more than the recommended dose. Too much aspirin can be harmful. ? Activated charcoal tablets, such as CharcoCaps, may decrease odor from gas you pass. ? If you have problems with lactose, you can take medicines such as Dairy Ease and Lactaid with dairy products to prevent gas and bloating. · Get some exercise regularly. When should you call for help? Call 911 anytime you think you may need emergency care. For example, call if: 
  · You have gas and signs of a heart attack, such as: 
? Chest pain or pressure. ? Sweating. ? Shortness of breath. ? Nausea or vomiting. ? Pain that spreads from the chest to the neck, jaw, or one or both shoulders or arms. ? Dizziness or lightheadedness. ? A fast or uneven pulse. After calling 911, chew 1 adult-strength aspirin. Wait for an ambulance. Do not try to drive yourself. Call your doctor now or seek immediate medical care if: 
  · You have severe belly pain.  
  · You have blood in your stool. Watch closely for changes in your health, and be sure to contact your doctor if: 
  · You have blood or pus in your urine.  
  · Your urine is cloudy or smells bad.  
  · You are burping and have trouble swallowing.  
  · You feel bloated and have swelling in your belly.  
  · You do not get better as expected. Where can you learn more? Go to http://www.[employer]/ Enter R495 in the search box to learn more about \"Gas and Bloating: Care Instructions. \" Current as of: June 26, 2019               Content Version: 12.6 © 0089-2275 [employer]. Care instructions adapted under license by [employer] (which disclaims liability or warranty for this information). If you have questions about a medical condition or this instruction, always ask your healthcare professional. [address] any warranty or liability for your use of this information. Gas and Bloating: Care Instructions Your Care Instructions Gas and bloating can be uncomfortable and embarrassing problems. All people pass gas, but some people produce more gas than others, sometimes enough to cause distress. It is normal to pass gas from 6 to 20 times per day. Excess gas usually is not caused by a serious health problem. Gas and bloating usually are caused by something you eat or drink, including some food supplements and medicines. Gas and bloating are usually harmless and go away without treatment. However, changing your diet can help end the problem. Some over-the-counter medicines can help prevent gas and relieve bloating. Follow-up care is a key part of your treatment and safety. Be sure to make and go to all appointments, and call your doctor if you are having problems. It's also a good idea to know your test results and keep a list of the medicines you take. How can you care for yourself at home? · Keep a food diary if you think a food gives you gas. Write down what you eat or drink. Also record when you get gas. If you notice that a food seems to cause your gas each time, avoid it and see if the gas goes away. Examples of foods that cause gas include: ? Fried and fatty foods. ? Beans. ? Vegetables such as artichokes, asparagus, broccoli, brussels sprouts, cabbage, cauliflower, cucumbers, green peppers, onions, peas, radishes, and raw potatoes. ? Fruits such as apricots, bananas, melons, peaches, pears, prunes, and raw apples. ? Wheat and wheat bran. · Soak dry beans in water overnight, then dump the water and cook the soaked beans in new water. This can help prevent gas and bloating. · If you have problems with lactose, avoid dairy products such as milk and cheese. · Try not to swallow air. Do not drink through a straw, gulp your food, or chew gum. · Take an over-the-counter medicine. Read and follow all instructions on the label. ? Food enzymes, such as Beano, can be added to gas-producing foods to prevent gas. ? Antacids, such as Maalox Anti-Gas and Mylanta Gas, can relieve bloating by making you burp. Be careful when you take over-the-counter antacid medicines. Many of these medicines have aspirin in them. Read the label to make sure that you are not taking more than the recommended dose. Too much aspirin can be harmful. ? Activated charcoal tablets, such as CharcoCaps, may decrease odor from gas you pass. ? If you have problems with lactose, you can take medicines such as Dairy Ease and Lactaid with dairy products to prevent gas and bloating. · Get some exercise regularly. When should you call for help? Call 911 anytime you think you may need emergency care. For example, call if: 
  · You have gas and signs of a heart attack, such as: 
? Chest pain or pressure. ? Sweating. ? Shortness of breath. ? Nausea or vomiting. ? Pain that spreads from the chest to the neck, jaw, or one or both shoulders or arms. ? Dizziness or lightheadedness. ? A fast or uneven pulse. After calling 911, chew 1 adult-strength aspirin. Wait for an ambulance. Do not try to drive yourself. Call your doctor now or seek immediate medical care if: 
  · You have severe belly pain.  
  · You have blood in your stool. Watch closely for changes in your health, and be sure to contact your doctor if: 
  · You have blood or pus in your urine.   · Your urine is cloudy or smells bad.  
  · You are burping and have trouble swallowing.  
  · You feel bloated and have swelling in your belly.  
  · You do not get better as expected. Where can you learn more? Go to http://www.gray.com/ Enter S486 in the search box to learn more about \"Gas and Bloating: Care Instructions. \" Current as of: June 26, 2019               Content Version: 12.6 © 9762-3770 [employer]. Care instructions adapted under license by [employer] (which disclaims liability or warranty for this information). If you have questions about a medical condition or this instruction, always ask your healthcare professional. [address] any warranty or liability for your use of this information.

## 2021-02-01 NOTE — PROGRESS NOTES
HISTORY OF PRESENT ILLNESS  Nini Cottrell is a 80 y.o. female. 31-year-old lady brought by a caretaker with multiple complaints, initially scheduled for reported concern about UTI after missing appointment this morning with her new PCP. Her primary concern seems to be episodes of abdominal pain that occur at night. She mentions a variety of other concerns including dizziness which she later describes as lightheadedness, right shoulder pain, fatigue and not sleeping well at night and denying daytime naps although the care taker indicates that she does take daytime naps. Chart review indicates history of vascular dementia, irritable bowel syndrome and bloating as well as a previous diagnosis with partial small bowel obstruction, chronic complaints of fatigue and a single lab result showing a urine culture positive for UTI in May 2020. She currently appears to deny UTI symptoms.     Mr#: [id number]      Patient Active Problem List   Diagnosis Code    Depression F32.9    HLD (hyperlipidemia) E78.5    Osteopenia M85.80    Diverticulosis K57.90    Allergic rhinitis J30.9    Hearing loss of both ears H91.93    OA (osteoarthritis) M19.90    PND (post-nasal drip) R09.82    Anxiety F41.9    Nephrolithiasis N20.0    IBS (irritable bowel syndrome) K58.9    Cerebral microvascular disease I67.89    CKD (chronic kidney disease) stage 3, GFR 30-59 ml/min N18.30    DNR (do not resuscitate) Z73    ESTHER (obstructive sleep apnea) G47.33    Chronic right shoulder pain M25.511, G89.29    Stress due to family tension Z63.8    Psychosocial stressors Z65.8    OAB (overactive bladder) N32.81    Calculus of gallbladder without cholecystitis without obstruction K80.20    B12 deficiency E53.8    Hx-TIA (transient ischemic attack) Z86.73    Small bowel obstruction, partial (HCC) K56.600    Vascular dementia without behavioral disturbance (HCC) F01.50    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    Current moderate episode of major depressive disorder without prior episode ([facility]) F32.1         Current Outpatient Medications:   •  naproxen (NAPROSYN) 500 mg tablet, Take 500 mg by mouth daily., Disp: , Rfl:   •  sertraline (Zoloft) 50 mg tablet, Take 1 Tab by mouth daily., Disp: 90 Tab, Rfl: 1  •  acetaminophen (TYLENOL EXTRA STRENGTH) 500 mg tablet, Take  by mouth every six (6) hours as needed for Pain., Disp: , Rfl:   •  simethicone (GAS RELIEF) 125 mg capsule, Take 125 mg by mouth four (4) times daily as needed for Flatulence., Disp: , Rfl:   •  fluticasone (24 HOUR ALLERGY RELIEF) 50 mcg/actuation nasal spray, 2 Sprays by Both Nostrils route daily., Disp: , Rfl:      Allergies   Allergen Reactions   • Other Medication Anaphylaxis     MRI Dye   • Ciprofloxacin Anxiety     Whole body starts shaking   • Epinephrine Palpitations   • Ivp [Fd And C Blue No.1] Swelling     Facial swelling many years ago, now only has oral contrast   • Macrobid [Nitrofurantoin Monohyd/M-Cryst] Other (comments)     Whole body starts shaking      • Other Medication Palpitations   • Prednisone Nausea and Vomiting       Review of Systems   Constitutional: Positive for malaise/fatigue. Negative for fever.   Respiratory: Negative for cough and shortness of breath.    Cardiovascular: Negative for chest pain, palpitations and leg swelling.   Gastrointestinal: Positive for abdominal pain. Negative for constipation, diarrhea, nausea and vomiting.   Genitourinary: Negative for dysuria, flank pain, frequency, hematuria and urgency.   Musculoskeletal: Positive for joint pain ( Right shoulder pain, chronic).   Neurological: Positive for dizziness ( Episodes of lightheadedness).   Psychiatric/Behavioral: Positive for memory loss.     Visit Vitals  /64 (BP 1 Location: Left upper arm, BP Patient Position: Sitting)   Pulse 75   Temp 97 °F (36.1 °C) (Temporal)   Resp 14   Ht 5' 3\" (1.6 m)   Wt 139 lb 6.4 oz (63.2 kg)   SpO2 99%   BMI 24.69 kg/m²  Physical Exam  Constitutional:       General: She is not in acute distress. Appearance: Normal appearance. She is not ill-appearing. HENT:      Head: Normocephalic. Eyes:      Extraocular Movements: Extraocular movements intact. Cardiovascular:      Rate and Rhythm: Normal rate and regular rhythm. Pulmonary:      Effort: Pulmonary effort is normal.      Breath sounds: Normal breath sounds. Abdominal:      General: There is no distension. Palpations: Abdomen is soft. There is no mass. Tenderness: There is no abdominal tenderness. Comments: Modestly increased bowel sounds   Skin:     General: Skin is warm and dry. Neurological:      Mental Status: She is alert. Mental status is at baseline. Psychiatric:         Mood and Affect: Mood normal.         Behavior: Behavior normal.      Comments: And behavior consistent with previous chart entries and consistent with  dementia       Results for Laura Martinez (MRN [id number]) as of 2/1/2021 15:41   Ref. Range 2/1/2021 13:50   Color (UA POC) Unknown Yellow   Clarity (UA POC) Unknown Clear   Specific gravity (UA POC) Latest Ref Range: 1.001 - 1.035  1.020   pH (UA POC) Latest Ref Range: 4.6 - 8.0  7.0   Protein (UA POC) Latest Ref Range: Negative  Negative   Glucose (UA POC) Latest Ref Range: Negative  Negative   Ketones (UA POC) Latest Ref Range: Negative  Negative   Blood (UA POC) Latest Ref Range: Negative  Negative   Bilirubin (UA POC) Latest Ref Range: Negative  Negative   Urobilinogen (UA POC) Latest Ref Range: 0.2 - 1  0.2 mg/dL   Nitrites (UA POC) Latest Ref Range: Negative  Negative   Leukocyte esterase (UA POC) Latest Ref Range: Negative  1+     ASSESSMENT and PLAN    ICD-10-CM ICD-9-CM    1. Abdominal pain, unspecified abdominal location  R10.9 789.00 AMB POC URINALYSIS DIP STICK MANUAL W/O MICRO      CULTURE, URINE   2.  Needs flu shot  Z23 V04.81 FLU (FLUAD QUAD INFLUENZA VACCINE,QUAD,ADJUVANTED)   Avoid gassy foods, list provided  Try taking Gas-X, Beano or Phazyme before bedtime  Schedule transfer of care appointment with new PCP  A urine culture has been submitted and you will be notified if any infection is detected

## 2021-02-02 ENCOUNTER — TELEPHONE (OUTPATIENT)
Dept: FAMILY MEDICINE CLINIC | Age: 86
End: 2021-02-02

## 2021-02-02 NOTE — TELEPHONE ENCOUNTER
Pt called because she was seen yesterday by Dr Rosalia Tilley and she told me that she was supposed to be sent a medication and that nothing was sent in. I checked with the nurse and verified what should be happening. She stated that they were waiting on the results from her urine before they send in any medication because they want to makes sure the right medication gets sent in. As of right now there is nothing back yet. I informed patient of this and she wanted to know if it usually takes this long for the results to come back. I informed her that with cultures it can take longer. It can take up to 3 days. Pt is wanting a call whenever the results are in or at the end of the day.  Whichever one is soonest.

## 2021-02-03 LAB — BACTERIA UR CULT: NORMAL

## 2021-02-03 NOTE — PROGRESS NOTES
Please advise the patient's family that the urine culture did not show evidence of a urinary tract infection

## 2021-02-05 ENCOUNTER — TELEPHONE (OUTPATIENT)
Dept: FAMILY MEDICINE CLINIC | Age: 86
End: 2021-02-05

## 2021-02-05 NOTE — TELEPHONE ENCOUNTER
Pt called stated she had been to the pharmacy to pickup a prescription for a uti but the pharmacy didn't have any thing for the pt.  Pt was informed by the nurse that she didn't have a uti and that's why there was no medication sent to the pharmacy and also daughter had been informed days ago regarding pt visit

## 2021-02-16 ENCOUNTER — TELEPHONE (OUTPATIENT)
Dept: FAMILY MEDICINE CLINIC | Age: 86
End: 2021-02-16

## 2021-02-16 NOTE — TELEPHONE ENCOUNTER
Pt is very confused about what's been going on with her. We have spoken to her daughter. She was at the hospital last week and they now have her in a rehab center. Patient thinks that she is in the hospital and that they \"are not doing anything for me. \" She kept asking me why is she at the hospital. I told her that she is at a rehab center and that they have her there for observation. She said that she knows that but they are not doing anything for her. \"I'm just sitting in my chair not doing anything! \". I told her that she needed to talk to her daughter or someone there at the facility because there was nothing that we would be able to do for her. She seems to think that Dr Gilda Jernigan visited her at the Hospital over the weekend so she kept asking if Dr Gilda Jernigan could do something for her. She also thought that he was her new PCP. I informed her that She saw Dr Gilda Jernigan on 2/1/21 but that he was not her new primary care. I told her that Dr Shawn Baldwin is her new pcp since Dr Shawn Baldwin left. But again they could not do anything for her while there at the facility because they don't work there and they don't know what's going on either. Pt was getting frustrated with me because she wasn't really understanding what I was telling her. She kept confusing the information. I told her that all I could do was ask that Sandra Ramon give her a call back but again warned her that she wouldn't be able to do anything for her.

## 2021-02-16 NOTE — TELEPHONE ENCOUNTER
Daughter Christi Fox was calling in at the same time patient was on the phone. She says the short story is pt was admitted for GI bleed after passing large blood while at the toilet at [facility]. Pt had been taking large amounts of naproxen and ibuprofen at her house that may have contributed to this. Per daughter, colo and EGD negative, it's possibly she had a diverticula bleeding. Pt has been sent to [facility] currently. Pt's daughters have cleared her apartment of these medications, only leaving her tylenol and zoloft. Social work at the rehab is working with daughter to obtain Medicaid for pt that may pay for patient to have a  for 8 hours daily. Family pays for a caregiver part time three days per week. I asked daughter if she truly thinks pt has enough care where she lives as safety is a major concern. She believes that placing pt in a SNF would be more detrimental to her mental health. She did say pt is confused, has her cell phone with her and is calling \"everybody\" to come get her out of rehab. Daughter knows we cannot release her from rehab. They will release her when she is cleared and should order home health services. Pt was to set up appt with Dr. Rebecca Butler but cancelled due to being inpt at that time. Daughter is considering requesting records to have pt follow Dr. Tim Thorne to new office.

## 2021-02-17 ENCOUNTER — PATIENT OUTREACH (OUTPATIENT)
Dept: CASE MANAGEMENT | Age: 86
End: 2021-02-17

## 2021-02-17 NOTE — PROGRESS NOTES
Patient was admitted to THE [facility] on 2/7/21and discharged on 2/16/21 for Lower GI bleed. Transition of care outreach postponed for 14 days due to patient's discharge to SNF.

## 2021-03-01 NOTE — TELEPHONE ENCOUNTER
Pt called wanting to speak to a nurse about her depression because she thinks she needs a referral to a psychiatrist. I did inform her of her telephone call scheduled for tomorrow w/ Dr Lisa Childress and asked her if she can wait to talk to Dr Lisa Childress tomorrow because she would be the one that could put in referrals for her. Pt would still like a call back today from the nurse to talk about her issue.  Please advise
Pt has an appt this afternoon with the dr to address the chronic issue.
PROVIDER:[TOKEN:[[id number]]]

## 2021-03-03 ENCOUNTER — PATIENT OUTREACH (OUTPATIENT)
Dept: CASE MANAGEMENT | Age: 86
End: 2021-03-03

## 2021-03-03 NOTE — PROGRESS NOTES
Patient was admitted to THE [facility] on 21 and discharged on 21 for Lower GI bleed. Care Transitions Initial Follow Up Call    Call within 2 business days of discharge: Yes     Patient: Adia Copeland Patient : 3/13/1931 MRN: [id number]    Last Discharge [address]       Complaint Diagnosis Description Type Department Provider    18 Abdominal Pain Abdominal pain, LLQ (left lower quadrant) . .. ED (DISCHARGE) QUINTIN Sanchez MD          Challenges to be reviewed by the provider   Additional needs identified to be addressed with provider None at this time. Home health is following. Method of communication with provider : Patient will follow up with her PCP. Care Transition Nurse (CTN) contacted the patient by telephone to perform post hospital discharge assessment. Verified name and  with patient as identifiers. Provided introduction to self, and explanation of the CTN role. Patient reached Patient on phone. Patient verified her identity using her full name and date of birth. Patient informed CTN that she was discharged two days ago from SNF and her new PCP is Dr. Kamar Smiley. Then suddenly said that Dr. Kamar Smiley was a hospitalist and her PCP is Dr. Mikel Dean who is in the same office as Dr. Kamar Smiley. Pt.    Upon review of Pt. Medical Hx on file, No hx of  Dementia noted. When CTN asked if she was discharge with home health, Pt. Stated \" what do you mean hotel. \"  CTN then explained what is home health to Pt. Patient reported that a nurse saw her yesterday. Upon review of  Notes on file, CTN noted a  Telephone encounter dated 21, noted Pt. Called Dr. John Watkins office and was confused. Patient aware of self and aware that she was just recently discharge from SNF. Patient also remembers that she saw [facility] nurse yesterday. CTN then asked Pt. If CTN can speak with her daughter. Pt. Given CTN permission to speak with her daughter Ms. Cooper Bjorn  (who is also listed on Pt. Current PHI). CTN spoke with Pt. Daughter,  Ms Myles Lowe on phone who informed CTN that patient is doing well. Patient daughter states that Pt. Has dementia and [place]. Patient daughter also reported that per Inpatient PT from the rehab states that Patient did good at the rehab place. Patient daughter also reported that Home nurse visited Pt. Yesterday and home health PT will see Patient today. Pt. Daughter  states that Patient has a personal care aide 8hrs /day 3x/week (MWF). Patient lives alone in a Senior Residence. Patient daughter reported that Pt. Meals are being deliver to  Pt. So Pt. Doesn't have to cook. Patient daughter reported that personal care aide supposed to be with Pt. Today. Patient daughter verified that patient will be following Dr. Kathrine Masterson to her new office. Strongly advised Pt. airam to call Benjamín Jaffe and to Frankie Lantigua  of Pt. PCP new office. Pt. Daughter states that she will do so. Patient daughter also reported that she will call Pt. PCP Dr. Kathrine Masterson for appt. Pt daughter states that Pt. Knows when to call 911 for emergency and Pt. Knows what to do when there is fire or if Pt. Hear a fire alarm. Reminded Pt. Daughter to call Pt. PCP for any questions, concerns and/or needs and to take Pt.  to the nearest emergency room for emergency s/s such as  chest pain, shortness of breath, returning of symptoms that brought her to the emergency room and/or worsening of symptoms. Pt. Daughter  Verbalized and repeated back understanding. No further questions, concerns and/or needs at this time as per Pt. Daughter. Pt. Daughter thanked us for follow up call. This episode is closed and resolved as Pt. Will follow up with Dr. Kathrine Masterson at her new office.

## 2021-03-04 NOTE — TELEPHONE ENCOUNTER
Pt has been discharged from rehab. She is receiving hope in home care for  service. The rehab signed those orders.

## 2021-03-15 ENCOUNTER — TRANSCRIBE ORDER (OUTPATIENT)
Dept: INTERNAL MEDICINE CLINIC | Age: 86
End: 2021-03-15

## 2022-03-18 PROBLEM — E53.8 B12 DEFICIENCY: Status: ACTIVE | Noted: 2019-06-21

## 2022-03-18 PROBLEM — M19.011 PRIMARY OSTEOARTHRITIS OF RIGHT SHOULDER: Status: ACTIVE | Noted: 2020-05-15

## 2022-03-18 PROBLEM — Z65.8 PSYCHOSOCIAL STRESSORS: Status: ACTIVE | Noted: 2019-03-05

## 2022-03-19 PROBLEM — K56.600 SMALL BOWEL OBSTRUCTION, PARTIAL (HCC): Status: ACTIVE | Noted: 2019-07-21

## 2022-03-19 PROBLEM — Z63.8 STRESS DUE TO FAMILY TENSION: Status: ACTIVE | Noted: 2019-03-05

## 2022-03-19 PROBLEM — N32.81 OAB (OVERACTIVE BLADDER): Status: ACTIVE | Noted: 2019-04-29

## 2022-03-19 PROBLEM — F32.1 CURRENT MODERATE EPISODE OF MAJOR DEPRESSIVE DISORDER WITHOUT PRIOR EPISODE (HCC): Status: ACTIVE | Noted: 2020-05-19

## 2022-03-19 PROBLEM — F01.50 VASCULAR DEMENTIA WITHOUT BEHAVIORAL DISTURBANCE (HCC): Status: ACTIVE | Noted: 2019-10-16

## 2022-03-19 PROBLEM — K80.20 CALCULUS OF GALLBLADDER WITHOUT CHOLECYSTITIS WITHOUT OBSTRUCTION: Status: ACTIVE | Noted: 2019-04-29

## 2022-03-20 PROBLEM — Z86.73 HX-TIA (TRANSIENT ISCHEMIC ATTACK): Status: ACTIVE | Noted: 2017-01-06

## 2023-05-26 RX ORDER — FLUTICASONE PROPIONATE 50 MCG
2 SPRAY, SUSPENSION (ML) NASAL DAILY
COMMUNITY

## 2023-05-26 RX ORDER — NAPROXEN 500 MG/1
500 TABLET ORAL DAILY
COMMUNITY

## 2023-05-26 RX ORDER — SIMETHICONE 125 MG
125 CAPSULE ORAL 4 TIMES DAILY PRN
COMMUNITY

## 2023-05-26 RX ORDER — ACETAMINOPHEN 500 MG
TABLET ORAL EVERY 6 HOURS PRN
COMMUNITY

## 2023-06-10 NOTE — MR AVS SNAPSHOT
Visit Information Date & Time Provider Department Dept. Phone Encounter #  
 3/7/2017 11:00 AM Xenia Hammer, Bola Soliman Rajat [phone] [id number] Your Appointments 3/17/2017  1:45 PM  
Follow Up with Shane Torre DO  
[facility] ([facility] Appt Note: follow up  
 [address] [phone]  
  
   
 [phone] Upcoming Health Maintenance Date Due Pneumococcal 65+ High/Highest Risk (2 of 2 - PPSV23) 1/1/2013 GLAUCOMA SCREENING Q2Y 9/18/2016 MEDICARE YEARLY EXAM 9/27/2017 DTaP/Tdap/Td series (2 - Td) 9/1/2026 Allergies as of 3/7/2017  Review Complete On: 3/7/2017 By: Xenia Hammer MD  
  
 Severity Noted Reaction Type Reactions Other Medication High 07/21/2014    Anaphylaxis MRI Dye Epinephrine  11/19/2010    Palpitations Ivp [Fd And C Blue No.1]  11/19/2010    Swelling Facial swelling many years ago, now only has oral contrast  
 Macrobid [Nitrofurantoin Monohyd/m-cryst]  09/01/2016    Other (comments) Whole body starts shaking Other Medication  11/19/2010    Palpitations Prednisone  01/28/2011    Nausea and Vomiting Current Immunizations  Reviewed on 6/26/2014 Name Date Influenza High Dose Vaccine PF 9/1/2016 12:05 PM, 9/15/2015 Influenza Vaccine 9/20/2014 Influenza Vaccine Whole 9/1/2010 Pneumococcal Vaccine (Unspecified Type) 1/1/2008 Not reviewed this visit You Were Diagnosed With   
  
 Codes Comments Recurrent UTI    -  Primary ICD-10-CM: N39.0 ICD-9-CM: 599.0 Vitals BP Pulse Temp Resp Height(growth percentile) Weight(growth percentile) 112/64 69 97.8 °F (36.6 °C) 16 5' 3\" (1.6 m) 160 lb (72.6 kg) SpO2 BMI OB Status Smoking Status 97% 28.34 kg/m2 Postmenopausal Never Smoker Vitals History BMI and BSA Data Body Mass Index Body Surface Area 28.34 kg/m 2 1.8 m 2 Preferred Pharmacy Pharmacy Name Phone [address], [place] [address] [phone] Your Updated Medication List  
  
   
This list is accurate as of: 3/7/17 11:08 AM.  Always use your most recent med list.  
  
  
  
  
 ALPRAZolam 0.5 mg tablet Commonly known as:  [place] Curio Take 1/2 -1 tab daily as needed for anxiety  
  
 amoxicillin-clavulanate 875-125 mg per tablet Commonly known as:  AUGMENTIN Take 1 Tab by mouth two (2) times a day for 7 days. aspirin 325 mg tablet Commonly known as:  ASPIRIN Take 1 Tab by mouth daily. citalopram 40 mg tablet Commonly known as:  CELEXA  
TAKE ONE TABLET BY MOUTH EVERY DAY  
  
 dicyclomine 10 mg capsule Commonly known as:  BENTYL Take 10 mg by mouth 4 times daily (before meals and nightly). FLONASE 50 mcg/actuation nasal spray Generic drug:  fluticasone 2 Sprays by Both Nostrils route daily. traMADol 50 mg tablet Commonly known as:  ULTRAM  
Take 1 Tab by mouth daily. Max Daily Amount: 50 mg.  
  
 TYLENOL EXTRA STRENGTH 500 mg tablet Generic drug:  acetaminophen Take  by mouth every six (6) hours as needed for Pain. VITAMIN D3 1,000 unit Cap Generic drug:  cholecalciferol Take  by mouth. We Performed the Following AMB POC URINALYSIS DIP STICK AUTO W/O MICRO [01151 CPT(R)] Introducing [facility]! [employer] introduces [email or web address] patient portal. Now you can access parts of your medical record, email your doctor's office, and request medication refills online. 1. In your internet browser, go to [email or web address]. [email or web address] Mobile 2. Click on the First Time User? Click Here link in the Sign In box. You will see the New Member Sign Up page. 3. Enter your [email or web address] Access Code exactly as it appears below. You will not need to use this code after youve completed the sign-up process.  If you do not sign up before the expiration date, you must request a new code. · [employer] Access Code: [email or web address] Expires: 4/26/2017 11:06 AM 
 
4. Enter the last four digits of your Social Security Number (xxxx) and Date of Birth (mm/dd/yyyy) as indicated and click Submit. You will be taken to the next sign-up page. 5. Create a [employer] ID. This will be your [employer] login ID and cannot be changed, so think of one that is secure and easy to remember. 6. Create a [employer] password. You can change your password at any time. 7. Enter your Password Reset Question and Answer. This can be used at a later time if you forget your password. 8. Enter your e-mail address. You will receive e-mail notification when new information is available in [address] 9. Click Sign Up. You can now view and download portions of your medical record. 10. Click the Download Summary menu link to download a portable copy of your medical information. If you have questions, please visit the Frequently Asked Questions section of the [employer] website. Remember, [employer] is NOT to be used for urgent needs. For medical emergencies, dial 911. Now available from your iPhone and Android! Please provide this summary of care documentation to your next provider. Your primary care clinician is listed as [place] [address]. If you have any questions after today's visit, please call [phone]. Pillai

## 2024-03-06 NOTE — PROGRESS NOTES
This is the Subsequent Medicare Annual Wellness Exam, performed 12 months or more after the Initial AWV or the last Subsequent AWV    Consent: Bree Shay, who was seen by synchronous (real-time) audio only technology, and/or her healthcare decision maker, is aware that this patient-initiated, Telehealth encounter on 6/4/2020 is a billable service. While AWVs are fully covered by Medicare, any services rendered on this date that are not included in an AWV are subject to additional billing, with coverage as determined by her insurance carrier. She is aware that she may receive a bill for any such additional services and has provided verbal consent to proceed: Yes. I have reviewed the patient's medical history in detail and updated the computerized patient record.      History     Patient Active Problem List   Diagnosis Code    Depression F32.9    HLD (hyperlipidemia) E78.5    Osteopenia M85.80    Diverticulosis K57.90    Allergic rhinitis J30.9    Hearing loss of both ears H91.93    OA (osteoarthritis) M19.90    PND (post-nasal drip) R09.82    Anxiety F41.9    Nephrolithiasis N20.0    IBS (irritable bowel syndrome) K58.9    Cerebral microvascular disease I67.9    CKD (chronic kidney disease) stage 3, GFR 30-59 ml/min (HCC) N18.3    DNR (do not resuscitate) Z73    ESTHER (obstructive sleep apnea) G47.33    Chronic right shoulder pain M25.511, G89.29    Stress due to family tension Z63.8    Psychosocial stressors Z65.8    OAB (overactive bladder) N32.81    Calculus of gallbladder without cholecystitis without obstruction K80.20    B12 deficiency E53.8    Hx-TIA (transient ischemic attack) Z86.73    Small bowel obstruction, partial (HCC) K56.600    Vascular dementia without behavioral disturbance (HCC) F01.50    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    Current moderate episode of major depressive disorder without prior episode ([facility] [address] F32.1     Past Medical History:   Diagnosis Date    Anxiety     Cancer ([facility] [address]     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    Diverticula of colon     Gastrointestinal disorder     High cholesterol     IBS (irritable bowel syndrome)     Kidney stone     Other ill-defined conditions(799.89)     heart murmur    Psychiatric disorder     anxiety    Skin cancer     Stroke [facility])       Past Surgical History:   Procedure Laterality Date    HX GYN  1970s    bladder tack in [facility]     HX OTHER SURGICAL  1980    cystocele    HX UROLOGICAL      bladder tac     Current Outpatient Medications   Medication Sig Dispense Refill    naproxen (NAPROSYN) 500 mg tablet Take 1 Tab by mouth two (2) times daily (with meals). 180 Tab 1    sertraline (Zoloft) 50 mg tablet Take 1 Tab by mouth daily. 90 Tab 1    cyanocobalamin 1,000 mcg tablet Take 1 Tab by mouth daily. 90 Tab 3    acetaminophen (TYLENOL EXTRA STRENGTH) 500 mg tablet Take  by mouth every six (6) hours as needed for Pain.  simethicone (GAS RELIEF) 125 mg capsule Take 125 mg by mouth four (4) times daily as needed for Flatulence.  fluticasone (24 HOUR ALLERGY RELIEF) 50 mcg/actuation nasal spray 2 Sprays by Both Nostrils route daily.        Allergies   Allergen Reactions    Other Medication Anaphylaxis     MRI Dye    Ciprofloxacin Anxiety     Whole body starts shaking    Epinephrine Palpitations    Ivp [Fd And C Blue No.1] Swelling     Facial swelling many years ago, now only has oral contrast    Macrobid [Nitrofurantoin Monohyd/M-Cryst] Other (comments)     Whole body starts shaking       Other Medication Palpitations    Prednisone Nausea and Vomiting       Family History   Problem Relation Age of Onset    Heart Attack Father     Heart Attack Brother      Social History     Tobacco Use    Smoking status: Never Smoker    Smokeless tobacco: Never Used   Substance Use Topics    Alcohol use: No       Depression Risk Factor Screening:     3 most recent PHQ Screens 6/4/2020   PHQ Not Done Active Diagnosis of Depression or Bipolar Disorder   Little interest or pleasure in doing things -   Feeling down, depressed, irritable, or hopeless -   Total Score PHQ 2 -   Trouble falling or staying asleep, or sleeping too much -   Feeling tired or having little energy -   Poor appetite, weight loss, or overeating -   Feeling bad about yourself - or that you are a failure or have let yourself or your family down -   Trouble concentrating on things such as school, work, reading, or watching TV -   Moving or speaking so slowly that other people could have noticed; or the opposite being so fidgety that others notice -   Thoughts of being better off dead, or hurting yourself in some way -   PHQ 9 Score -   How difficult have these problems made it for you to do your work, take care of your home and get along with others -       Alcohol Risk Factor Screening:   Do you average 1 drink per night or more than 7 drinks a week:  No    On any one occasion in the past three months have you have had more than 3 drinks containing alcohol:  No      Functional Ability and Level of Safety:   Hearing: Hearing is good. Activities of Daily Living: The home contains: handrails  Patient needs help with:  managing medications and managing money    Ambulation: with no difficulty    Fall Risk:  Fall Risk Assessment, last 12 mths 3/5/2020   Able to walk? Yes   Fall in past 12 months? No   Fall with injury? -   Number of falls in past 12 months -   Fall Risk Score -       Abuse Screen:  Patient is not abused    Cognitive Screening   Has your family/caregiver stated any concerns about your memory: yes - chronic dementia  Cognitive Screening: .     Patient Care Team   Patient Care Team:  Eusebio Logan MD as PCP - General (Internal Medicine)  Eusebio Logan MD as PCP - REHABILITATION [facility] Empaneled Provider  Laurie Meyer MD as Consulting Provider (Gastroenterology)  Eusebio Logan MD (Internal Medicine)    Assessment/Plan   Education and counseling provided:  Are appropriate based on today's review and evaluation  End-of-Life planning (with patient's consent)    Diagnoses and all orders for this visit:    1. OAB (overactive bladder)- start ditropan  -     oxybutynin chloride XL (Ditropan XL) 10 mg CR tablet; Take 1 Tab by mouth daily. 2. B12 deficiency- will start shots    3. ESTHER (obstructive sleep apnea)- encouraged compliance with cpap as this is contributing to daytime somnolence    4. Medicare annual wellness visit, subsequent    5. Full code status  -     FULL CODE        There are no preventive care reminders to display for this patient. Gosia Vicente is a 80 y.o. female who was evaluated by an audio only encounter for concerns as above. Patient identification was verified prior to start of the visit. A caregiver was present when appropriate. Due to this being a TeleHealth encounter (During ALCZT-00 public health emergency), evaluation of the following organ systems was limited: Vitals/Constitutional/EENT/Resp/CV/GI//MS/Neuro/Skin/Heme-Lymph-Imm. Pursuant to the emergency declaration under the [facility], [facility] waiver authority and the [employer] Act, this Virtual Visit was conducted, with patient's (and/or legal guardian's) consent, to reduce the patient's risk of exposure to COVID-19 and provide necessary medical care. Services were provided through a synchronous discussion virtually to substitute for in-person clinic visit. I was at home. The patient was at home. MD Idania Yadav is a 80 y.o. female who was seen by synchronous (real-time) audio-video technology on 6/4/2020.       Consent: Gosia Vicente, who was seen by synchronous (real-time) audio-video technology, and/or her healthcare decision maker, is aware that this patient-initiated, Telehealth encounter on 6/4/2020 is a billable service, with coverage as determined by her insurance carrier. She is aware that she may receive a bill and has provided verbal consent to proceed: Yes. Assessment & Plan:   Diagnoses and all orders for this visit:    1. OAB (overactive bladder)  -     oxybutynin chloride XL (Ditropan XL) 10 mg CR tablet; Take 1 Tab by mouth daily. 2. B12 deficiency- start 12 shots    3. ESTHER (obstructive sleep apnea)    4. Medicare annual wellness visit, subsequent    5. Full code status  -     FULL CODE        Subjective: Cr Ortega is a 80 y.o. female who was seen for Annual Wellness Visit    Pt c/o urinary frequency. U/a has been neg in ER. She also c/o significant fatigue. Sleeps 11-12hr/day. Not compliant with cpap for esther. Prior to Admission medications    Medication Sig Start Date End Date Taking? Authorizing Provider   oxybutynin chloride XL (Ditropan XL) 10 mg CR tablet Take 1 Tab by mouth daily. 6/4/20  Yes Radha Vargas MD   naproxen (NAPROSYN) 500 mg tablet Take 1 Tab by mouth two (2) times daily (with meals). 6/2/20   Briseyda Russell MD   sertraline (Zoloft) 50 mg tablet Take 1 Tab by mouth daily. 6/2/20   Briseyda Russell MD   cyanocobalamin 1,000 mcg tablet Take 1 Tab by mouth daily. 6/2/20 6/4/20  Briseyda Russell MD   acetaminophen (TYLENOL EXTRA STRENGTH) 500 mg tablet Take  by mouth every six (6) hours as needed for Pain. Provider, Historical   simethicone (GAS RELIEF) 125 mg capsule Take 125 mg by mouth four (4) times daily as needed for Flatulence. Provider, Historical   fluticasone (24 HOUR ALLERGY RELIEF) 50 mcg/actuation nasal spray 2 Sprays by Both Nostrils route daily.     Provider, Historical     Allergies   Allergen Reactions    Other Medication Anaphylaxis     MRI Dye    Ciprofloxacin Anxiety     Whole body starts shaking    Epinephrine Palpitations    Ivp [Fd And C Blue No.1] Swelling     Facial swelling many years ago, now only has oral contrast    Macrobid [Nitrofurantoin Monohyd/M-Cryst] Other (comments)     Whole body starts shaking       Other Medication Palpitations    Prednisone Nausea and Vomiting       Patient Active Problem List   Diagnosis Code    Depression F32.9    HLD (hyperlipidemia) E78.5    Osteopenia M85.80    Diverticulosis K57.90    Allergic rhinitis J30.9    Hearing loss of both ears H91.93    OA (osteoarthritis) M19.90    PND (post-nasal drip) R09.82    Anxiety F41.9    Nephrolithiasis N20.0    IBS (irritable bowel syndrome) K58.9    Cerebral microvascular disease I67.9    CKD (chronic kidney disease) stage 3, GFR 30-59 ml/min ([facility]) N18.3    DNR (do not resuscitate) Z66    ESTHER (obstructive sleep apnea) G47.33    Chronic right shoulder pain M25.511, G89.29    Stress due to family tension Z63.8    Psychosocial stressors Z65.8    OAB (overactive bladder) N32.81    Calculus of gallbladder without cholecystitis without obstruction K80.20    B12 deficiency E53.8    Hx-TIA (transient ischemic attack) Z86.73    Small bowel obstruction, partial ([facility]) K56.600    Vascular dementia without behavioral disturbance ([facility]) F01.50    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    Current moderate episode of major depressive disorder without prior episode ([facility] [address] F32.1       Review of Systems   Constitutional: Positive for malaise/fatigue. Genitourinary: Positive for frequency. Negative for dysuria and urgency. Objective:   Vital Signs: (As obtained by patient/caregiver at home)  There were no vitals taken for this visit.      [INSTRUCTIONS:  \"[x]\" Indicates a positive item  \"[]\" Indicates a negative item  -- DELETE ALL ITEMS NOT EXAMINED]    Constitutional: [x] Appears well-developed and well-nourished [x] No apparent distress      [] Abnormal -     Mental status: [x] Alert and awake  [x] Oriented to person/place/time [x] Able to follow commands    [] Abnormal -     Eyes:   EOM    [x]  Normal    [] Abnormal -   Sclera  [x]  Normal    [] Abnormal - Discharge [x]  None visible   [] Abnormal -     HENT: [] Normocephalic, atraumatic  [] Abnormal -   [] Mouth/Throat: Mucous membranes are moist    External Ears [x] Normal  [] Abnormal -    Neck: [] No visualized mass [] Abnormal -     Pulmonary/Chest: [x] Respiratory effort normal   [x] No visualized signs of difficulty breathing or respiratory distress        [] Abnormal -      Musculoskeletal:   [x] Normal gait with no signs of ataxia         [x] Normal range of motion of neck        [] Abnormal -     Neurological:        [x] No Facial Asymmetry (Cranial nerve 7 motor function) (limited exam due to video visit)          [x] No gaze palsy        [] Abnormal -          Skin:        [x] No significant exanthematous lesions or discoloration noted on facial skin         [] Abnormal -            Psychiatric:       [x] Normal Affect [] Abnormal -        [x] No Hallucinations    Other pertinent observable physical exam findings:-        We discussed the expected course, resolution and complications of the diagnosis(es) in detail. Medication risks, benefits, costs, interactions, and alternatives were discussed as indicated. I advised her to contact the office if her condition worsens, changes or fails to improve as anticipated. She expressed understanding with the diagnosis(es) and plan. Rommel Levin is a 80 y.o. female who was evaluated by a video visit encounter for concerns as above. Patient identification was verified prior to start of the visit. A caregiver was present when appropriate. Due to this being a TeleHealth encounter (During [place] [address] public health emergency), evaluation of the following organ systems was limited: Vitals/Constitutional/EENT/Resp/CV/GI//MS/Neuro/Skin/Heme-Lymph-Imm.   Pursuant to the emergency declaration under the [facility], [address] [place] [address] authority and the [employer] Act, this Virtual Visit was conducted, with patient's (and/or legal guardian's) consent, to reduce the patient's risk of exposure to COVID-19 and provide necessary medical care. Services were provided through a video synchronous discussion virtually to substitute for in-person clinic visit. Patient and provider were located at their individual homes.       Katarina Freed MD No

## 2025-03-05 NOTE — PROGRESS NOTES
Carmel Cat is a 80 y.o. female (: 3/13/1931) presenting to address:    Chief Complaint   Patient presents with    Depression    Medication Evaluation    Immunization/Injection     Flu shot       Vitals:    18 1047   BP: 130/82   Pulse: 77   Resp: 20   Temp: 98.9 °F (37.2 °C)   TempSrc: Oral   SpO2: 97%   Weight: 155 lb (70.3 kg)   Height: 5' 3\" (1.6 m)   PainSc:   0 - No pain       Learning Assessment:     Learning Assessment 2015   PRIMARY LEARNER Patient   HIGHEST LEVEL OF EDUCATION - PRIMARY LEARNER  SOME COLLEGE   BARRIERS PRIMARY LEARNER HEARING   CO-LEARNER CAREGIVER No   PRIMARY LANGUAGE ENGLISH    NEED -   LEARNER PREFERENCE PRIMARY READING   LEARNING SPECIAL TOPICS -   ANSWERED BY self   RELATIONSHIP SELF     Depression Screening:     PHQ over the last two weeks 2018   PHQ Not Done Active Diagnosis of Depression or Bipolar Disorder     Fall Risk Assessment:     Fall Risk Assessment, last 12 mths 7/3/2018   Able to walk? Yes   Fall in past 12 months? Yes   Fall with injury? No   Number of falls in past 12 months 4   Fall Risk Score 4     Abuse Screening:     Abuse Screening Questionnaire 2017   Do you ever feel afraid of your partner? N   Are you in a relationship with someone who physically or mentally threatens you? N   Is it safe for you to go home? Y     Coordination of Care Questionaire:   1. Have you been to the ER, urgent care clinic since your last visit? Hospitalized since your last visit? NO    2. Have you seen or consulted any other health care providers outside of the [address] [place] [address] since your last visit? Include any pap smears or colon screening. NO    Advanced Directive:   1. Do you have an Advanced Directive? YES    2. Would you like information on Advanced Directives?  NO 53.9 53.9 53.9

## 2025-06-27 NOTE — TELEPHONE ENCOUNTER
Problem: Discharge Planning  Goal: Discharge to home or other facility with appropriate resources  6/27/2025 1041 by Agueda Downing RN  Outcome: Adequate for Discharge  6/27/2025 1035 by Agueda Downing RN  Outcome: Progressing  6/27/2025 1035 by Agueda Downing RN  Outcome: Progressing  6/26/2025 2200 by Yadira Irene RN  Outcome: Progressing  Flowsheets (Taken 6/26/2025 2000)  Discharge to home or other facility with appropriate resources: Identify barriers to discharge with patient and caregiver     Problem: ABCDS Injury Assessment  Goal: Absence of physical injury  6/27/2025 1041 by Agueda Downing RN  Outcome: Adequate for Discharge  6/27/2025 1035 by Agueda Downing RN  Outcome: Progressing  6/27/2025 1035 by Agueda Downing RN  Outcome: Progressing  6/26/2025 2200 by Yadira Irene RN  Outcome: Progressing     Problem: Safety - Adult  Goal: Free from fall injury  6/27/2025 1041 by Agueda Downing RN  Outcome: Adequate for Discharge  6/27/2025 1035 by Agueda Downing RN  Outcome: Progressing  6/27/2025 1035 by Agueda Downing RN  Outcome: Progressing  6/26/2025 2200 by Yadira Irene RN  Outcome: Progressing     Problem: Pain  Goal: Verbalizes/displays adequate comfort level or baseline comfort level  6/27/2025 1041 by Agueda Downing RN  Outcome: Adequate for Discharge  6/27/2025 1035 by Agueda Downing RN  Outcome: Progressing  6/27/2025 1035 by Agueda Downing RN  Outcome: Progressing  6/26/2025 2200 by Yadira Irene RN  Outcome: Progressing      Pt called because she got a letter from [facility] stating it's time for her Colon Cancer Screening and Cervical Cancer Screening. Pt is wondering if it's something she should do. Pt is not due for her [address] until 9/2017.